# Patient Record
Sex: MALE | Race: OTHER | NOT HISPANIC OR LATINO
[De-identification: names, ages, dates, MRNs, and addresses within clinical notes are randomized per-mention and may not be internally consistent; named-entity substitution may affect disease eponyms.]

---

## 2020-10-29 ENCOUNTER — RESULT REVIEW (OUTPATIENT)
Age: 70
End: 2020-10-29

## 2020-10-29 ENCOUNTER — OUTPATIENT (OUTPATIENT)
Dept: OUTPATIENT SERVICES | Facility: HOSPITAL | Age: 70
LOS: 1 days | End: 2020-10-29
Payer: MEDICARE

## 2020-10-29 VITALS
HEIGHT: 66 IN | HEART RATE: 61 BPM | TEMPERATURE: 98 F | OXYGEN SATURATION: 99 % | SYSTOLIC BLOOD PRESSURE: 124 MMHG | DIASTOLIC BLOOD PRESSURE: 82 MMHG | WEIGHT: 153 LBS | RESPIRATION RATE: 28 BRPM

## 2020-10-29 VITALS
RESPIRATION RATE: 20 BRPM | OXYGEN SATURATION: 99 % | DIASTOLIC BLOOD PRESSURE: 71 MMHG | SYSTOLIC BLOOD PRESSURE: 101 MMHG | HEART RATE: 56 BPM

## 2020-10-29 DIAGNOSIS — Z87.81 PERSONAL HISTORY OF (HEALED) TRAUMATIC FRACTURE: Chronic | ICD-10-CM

## 2020-10-29 DIAGNOSIS — Z86.010 PERSONAL HISTORY OF COLONIC POLYPS: ICD-10-CM

## 2020-10-29 LAB — GLUCOSE BLDC GLUCOMTR-MCNC: 140 MG/DL — HIGH (ref 70–99)

## 2020-10-29 PROCEDURE — 88305 TISSUE EXAM BY PATHOLOGIST: CPT | Mod: 26

## 2020-10-29 PROCEDURE — 45385 COLONOSCOPY W/LESION REMOVAL: CPT | Mod: PT

## 2020-10-29 PROCEDURE — 88305 TISSUE EXAM BY PATHOLOGIST: CPT

## 2020-10-29 PROCEDURE — 82962 GLUCOSE BLOOD TEST: CPT

## 2020-10-29 RX ORDER — ALLOPURINOL 300 MG
1 TABLET ORAL
Qty: 0 | Refills: 0 | DISCHARGE

## 2020-10-29 RX ORDER — CANAGLIFLOZIN AND METFORMIN HYDROCHLORIDE 50; 500 MG/1; MG/1
1 TABLET, FILM COATED, EXTENDED RELEASE ORAL
Qty: 0 | Refills: 0 | DISCHARGE

## 2020-10-29 RX ORDER — SODIUM CHLORIDE 9 MG/ML
1000 INJECTION INTRAMUSCULAR; INTRAVENOUS; SUBCUTANEOUS
Refills: 0 | Status: DISCONTINUED | OUTPATIENT
Start: 2020-10-29 | End: 2020-11-12

## 2020-10-29 RX ORDER — SACUBITRIL AND VALSARTAN 24; 26 MG/1; MG/1
0 TABLET, FILM COATED ORAL
Qty: 0 | Refills: 0 | DISCHARGE

## 2020-10-29 NOTE — ASU PATIENT PROFILE, ADULT - PMH
CAD (coronary artery disease)  reports angiogram - 1 year ago - pt reports non obstructive  at [facility]  CHF (congestive heart failure)    Diabetes  A1C 6.8  on admission  Former smoker    HLD (hyperlipidemia)    Hypertension

## 2020-10-29 NOTE — PRE PROCEDURE NOTE - PRE PROCEDURE EVALUATION
Attending Physician:              Ryan Sanford M.D.              Procedure: colonoscopy    Indication for Procedure: CRCS  ________________________________________________________  PAST MEDICAL & SURGICAL HISTORY:  CHF (congestive heart failure)    CAD (coronary artery disease)  reports angiogram - 1 year ago - pt reports non obstructive  at [facility]    HLD (hyperlipidemia)    Former smoker    Diabetes  A1C 6.8  on admission    Hypertension    H/O fracture of wrist  and left ankle (ORIF ankle) following fall      ALLERGIES:  No Known Allergies    HOME MEDICATIONS:  allopurinol 300 mg oral tablet: 1 tab(s) orally once a day  aspirin 81 mg oral delayed release tablet: 1 tab(s) orally once a day  HOME/HOSP  atorvastatin 80 mg oral tablet: 1 tab(s) orally once a day (at bedtime)  HOME/HOSP  Coreg 6.25 mg oral tablet: 1 tab(s) orally 2 times a day HOME/HOSP  Entresto 24 mg-26 mg oral tablet: orally 2 times a day  Invokamet 150 mg-1000 mg oral tablet: 1 tab(s) orally 2 times a day (with meals)  Lasix 40 mg oral tablet: 1 tab(s) orally once a day    AICD/PPM: [ ] yes   [ ] no    PERTINENT LAB DATA:                      PHYSICAL EXAMINATION:    Height (cm): 167.6  Weight (kg): 69.4  BMI (kg/m2): 24.7  BSA (m2): 1.78T(C): 36.4  HR: 61  BP: 124/82  RR: 28  SpO2: 99%    Constitutional: NAD  HEENT: PERRLA, EOMI,    Neck:  No JVD  Respiratory: CTAB/L  Cardiovascular: S1 and S2  Gastrointestinal: BS+, soft, NT/ND  Extremities: No peripheral edema  Neurological: A/O x 3, no focal deficits  Psychiatric: Normal mood, normal affect  Skin: No rashes    ASA Class: I [ ]  II [ ]  III [ ]  IV [x ]    COMMENTS:    The patient is a suitable candidate for the planned procedure unless box checked [ ]  No, explain:

## 2020-10-30 LAB — SURGICAL PATHOLOGY STUDY: SIGNIFICANT CHANGE UP

## 2022-01-01 ENCOUNTER — INPATIENT (INPATIENT)
Facility: HOSPITAL | Age: 72
LOS: 5 days | DRG: 215 | End: 2022-05-17
Attending: INTERNAL MEDICINE | Admitting: INTERNAL MEDICINE
Payer: MEDICARE

## 2022-01-01 VITALS — HEIGHT: 66 IN | HEART RATE: 94 BPM | OXYGEN SATURATION: 99 % | WEIGHT: 162.04 LBS | RESPIRATION RATE: 36 BRPM

## 2022-01-01 DIAGNOSIS — Z51.5 ENCOUNTER FOR PALLIATIVE CARE: ICD-10-CM

## 2022-01-01 DIAGNOSIS — R79.89 OTHER SPECIFIED ABNORMAL FINDINGS OF BLOOD CHEMISTRY: ICD-10-CM

## 2022-01-01 DIAGNOSIS — R57.0 CARDIOGENIC SHOCK: ICD-10-CM

## 2022-01-01 DIAGNOSIS — I50.23 ACUTE ON CHRONIC SYSTOLIC (CONGESTIVE) HEART FAILURE: ICD-10-CM

## 2022-01-01 DIAGNOSIS — N17.9 ACUTE KIDNEY FAILURE, UNSPECIFIED: ICD-10-CM

## 2022-01-01 DIAGNOSIS — Z71.89 OTHER SPECIFIED COUNSELING: ICD-10-CM

## 2022-01-01 DIAGNOSIS — Z87.81 PERSONAL HISTORY OF (HEALED) TRAUMATIC FRACTURE: Chronic | ICD-10-CM

## 2022-01-01 DIAGNOSIS — R53.2 FUNCTIONAL QUADRIPLEGIA: ICD-10-CM

## 2022-01-01 LAB
-  AMPICILLIN/SULBACTAM: SIGNIFICANT CHANGE UP
-  CEFAZOLIN: SIGNIFICANT CHANGE UP
-  CLINDAMYCIN: SIGNIFICANT CHANGE UP
-  ERYTHROMYCIN: SIGNIFICANT CHANGE UP
-  GENTAMICIN: SIGNIFICANT CHANGE UP
-  LINEZOLID: SIGNIFICANT CHANGE UP
-  OXACILLIN: SIGNIFICANT CHANGE UP
-  PENICILLIN: SIGNIFICANT CHANGE UP
-  RIFAMPIN: SIGNIFICANT CHANGE UP
-  TETRACYCLINE: SIGNIFICANT CHANGE UP
-  TRIMETHOPRIM/SULFAMETHOXAZOLE: SIGNIFICANT CHANGE UP
-  VANCOMYCIN: SIGNIFICANT CHANGE UP
A1C WITH ESTIMATED AVERAGE GLUCOSE RESULT: 7.7 % — HIGH (ref 4–5.6)
ALBUMIN SERPL ELPH-MCNC: 2.4 G/DL — LOW (ref 3.3–5)
ALBUMIN SERPL ELPH-MCNC: 2.5 G/DL — LOW (ref 3.3–5)
ALBUMIN SERPL ELPH-MCNC: 2.5 G/DL — LOW (ref 3.3–5)
ALBUMIN SERPL ELPH-MCNC: 2.6 G/DL — LOW (ref 3.3–5)
ALBUMIN SERPL ELPH-MCNC: 2.7 G/DL — LOW (ref 3.3–5)
ALBUMIN SERPL ELPH-MCNC: 2.8 G/DL — LOW (ref 3.3–5)
ALBUMIN SERPL ELPH-MCNC: 2.9 G/DL — LOW (ref 3.3–5)
ALBUMIN SERPL ELPH-MCNC: 3 G/DL — LOW (ref 3.3–5)
ALBUMIN SERPL ELPH-MCNC: 3 G/DL — LOW (ref 3.3–5)
ALBUMIN SERPL ELPH-MCNC: 3.1 G/DL — LOW (ref 3.3–5)
ALBUMIN SERPL ELPH-MCNC: 3.2 G/DL — LOW (ref 3.3–5)
ALBUMIN SERPL ELPH-MCNC: 3.3 G/DL — SIGNIFICANT CHANGE UP (ref 3.3–5)
ALBUMIN SERPL ELPH-MCNC: 4.7 G/DL — SIGNIFICANT CHANGE UP (ref 3.3–5)
ALP SERPL-CCNC: 101 U/L — SIGNIFICANT CHANGE UP (ref 40–120)
ALP SERPL-CCNC: 115 U/L — SIGNIFICANT CHANGE UP (ref 40–120)
ALP SERPL-CCNC: 117 U/L — SIGNIFICANT CHANGE UP (ref 40–120)
ALP SERPL-CCNC: 123 U/L — HIGH (ref 40–120)
ALP SERPL-CCNC: 140 U/L — HIGH (ref 40–120)
ALP SERPL-CCNC: 151 U/L — HIGH (ref 40–120)
ALP SERPL-CCNC: 160 U/L — HIGH (ref 40–120)
ALP SERPL-CCNC: 188 U/L — HIGH (ref 40–120)
ALP SERPL-CCNC: 190 U/L — HIGH (ref 40–120)
ALP SERPL-CCNC: 203 U/L — HIGH (ref 40–120)
ALP SERPL-CCNC: 206 U/L — HIGH (ref 40–120)
ALP SERPL-CCNC: 210 U/L — HIGH (ref 40–120)
ALP SERPL-CCNC: 218 U/L — HIGH (ref 40–120)
ALP SERPL-CCNC: 223 U/L — HIGH (ref 40–120)
ALP SERPL-CCNC: 245 U/L — HIGH (ref 40–120)
ALP SERPL-CCNC: 89 U/L — SIGNIFICANT CHANGE UP (ref 40–120)
ALP SERPL-CCNC: 91 U/L — SIGNIFICANT CHANGE UP (ref 40–120)
ALP SERPL-CCNC: 97 U/L — SIGNIFICANT CHANGE UP (ref 40–120)
ALT FLD-CCNC: 1600 U/L — HIGH (ref 10–45)
ALT FLD-CCNC: 2200 U/L — HIGH (ref 10–45)
ALT FLD-CCNC: 246 U/L — HIGH (ref 10–45)
ALT FLD-CCNC: 2480 U/L — HIGH (ref 10–45)
ALT FLD-CCNC: 2672 U/L — HIGH (ref 10–45)
ALT FLD-CCNC: 3047 U/L — HIGH (ref 10–45)
ALT FLD-CCNC: 3338 U/L — HIGH (ref 10–45)
ALT FLD-CCNC: 3735 U/L — HIGH (ref 10–45)
ALT FLD-CCNC: 4417 U/L — HIGH (ref 10–45)
ALT FLD-CCNC: 4503 U/L — HIGH (ref 10–45)
ALT FLD-CCNC: 4838 U/L — HIGH (ref 10–45)
ALT FLD-CCNC: 5005 U/L — HIGH (ref 10–45)
ALT FLD-CCNC: 5611 U/L — HIGH (ref 10–45)
ALT FLD-CCNC: 6524 U/L — HIGH (ref 10–45)
ALT FLD-CCNC: 6743 U/L — HIGH (ref 10–45)
ALT FLD-CCNC: 6953 U/L — HIGH (ref 10–45)
ALT FLD-CCNC: 7109 U/L — HIGH (ref 10–45)
ALT FLD-CCNC: 7381 U/L — HIGH (ref 10–45)
AMMONIA BLD-MCNC: 60 UMOL/L — HIGH (ref 11–55)
ANION GAP SERPL CALC-SCNC: 19 MMOL/L — HIGH (ref 5–17)
ANION GAP SERPL CALC-SCNC: 20 MMOL/L — HIGH (ref 5–17)
ANION GAP SERPL CALC-SCNC: 20 MMOL/L — HIGH (ref 5–17)
ANION GAP SERPL CALC-SCNC: 21 MMOL/L — HIGH (ref 5–17)
ANION GAP SERPL CALC-SCNC: 21 MMOL/L — HIGH (ref 5–17)
ANION GAP SERPL CALC-SCNC: 23 MMOL/L — HIGH (ref 5–17)
ANION GAP SERPL CALC-SCNC: 24 MMOL/L — HIGH (ref 5–17)
ANION GAP SERPL CALC-SCNC: 24 MMOL/L — HIGH (ref 5–17)
ANION GAP SERPL CALC-SCNC: 25 MMOL/L — HIGH (ref 5–17)
ANION GAP SERPL CALC-SCNC: 26 MMOL/L — HIGH (ref 5–17)
ANION GAP SERPL CALC-SCNC: 26 MMOL/L — HIGH (ref 5–17)
ANION GAP SERPL CALC-SCNC: 29 MMOL/L — HIGH (ref 5–17)
ANION GAP SERPL CALC-SCNC: 29 MMOL/L — HIGH (ref 5–17)
ANION GAP SERPL CALC-SCNC: 31 MMOL/L — HIGH (ref 5–17)
ANISOCYTOSIS BLD QL: SLIGHT — SIGNIFICANT CHANGE UP
APPEARANCE UR: CLEAR — SIGNIFICANT CHANGE UP
APTT BLD: 45.3 SEC — HIGH (ref 27.5–35.5)
APTT BLD: 48.7 SEC — HIGH (ref 27.5–35.5)
APTT BLD: 48.9 SEC — HIGH (ref 27.5–35.5)
APTT BLD: 62.8 SEC — HIGH (ref 27.5–35.5)
APTT BLD: 70.3 SEC — HIGH (ref 27.5–35.5)
APTT BLD: 74.3 SEC — HIGH (ref 27.5–35.5)
APTT BLD: 75.4 SEC — HIGH (ref 27.5–35.5)
APTT BLD: 79.1 SEC — HIGH (ref 27.5–35.5)
APTT BLD: 90.7 SEC — HIGH (ref 27.5–35.5)
APTT BLD: >200 SEC — CRITICAL HIGH (ref 27.5–35.5)
AST SERPL-CCNC: 1087 U/L — HIGH (ref 10–40)
AST SERPL-CCNC: 1619 U/L — HIGH (ref 10–40)
AST SERPL-CCNC: 1973 U/L — HIGH (ref 10–40)
AST SERPL-CCNC: 1976 U/L — HIGH (ref 10–40)
AST SERPL-CCNC: 2660 U/L — HIGH (ref 10–40)
AST SERPL-CCNC: 304 U/L — HIGH (ref 10–40)
AST SERPL-CCNC: 3804 U/L — HIGH (ref 10–40)
AST SERPL-CCNC: 4957 U/L — HIGH (ref 10–40)
AST SERPL-CCNC: 5016 U/L — HIGH (ref 10–40)
AST SERPL-CCNC: 5831 U/L — HIGH (ref 10–40)
AST SERPL-CCNC: 684 U/L — HIGH (ref 10–40)
AST SERPL-CCNC: 7104 U/L — HIGH (ref 10–40)
AST SERPL-CCNC: 8919 U/L — HIGH (ref 10–40)
AST SERPL-CCNC: 9096 U/L — HIGH (ref 10–40)
AST SERPL-CCNC: 940 U/L — HIGH (ref 10–40)
AST SERPL-CCNC: HIGH U/L (ref 10–40)
BACTERIA # UR AUTO: NEGATIVE — SIGNIFICANT CHANGE UP
BASE EXCESS BLDMV CALC-SCNC: -0.2 MMOL/L — SIGNIFICANT CHANGE UP (ref -3–3)
BASE EXCESS BLDMV CALC-SCNC: -0.5 MMOL/L — SIGNIFICANT CHANGE UP (ref -3–3)
BASE EXCESS BLDMV CALC-SCNC: -0.5 MMOL/L — SIGNIFICANT CHANGE UP (ref -3–3)
BASE EXCESS BLDMV CALC-SCNC: -1.1 MMOL/L — SIGNIFICANT CHANGE UP (ref -3–3)
BASE EXCESS BLDMV CALC-SCNC: -1.7 MMOL/L — SIGNIFICANT CHANGE UP (ref -3–3)
BASE EXCESS BLDMV CALC-SCNC: -15.3 MMOL/L — LOW (ref -3–3)
BASE EXCESS BLDMV CALC-SCNC: -2.2 MMOL/L — SIGNIFICANT CHANGE UP (ref -3–3)
BASE EXCESS BLDMV CALC-SCNC: -4.3 MMOL/L — LOW (ref -3–3)
BASE EXCESS BLDMV CALC-SCNC: -4.4 MMOL/L — LOW (ref -3–3)
BASE EXCESS BLDMV CALC-SCNC: -5.2 MMOL/L — LOW (ref -3–3)
BASE EXCESS BLDMV CALC-SCNC: -5.4 MMOL/L — LOW (ref -3–3)
BASE EXCESS BLDMV CALC-SCNC: -5.4 MMOL/L — LOW (ref -3–3)
BASE EXCESS BLDMV CALC-SCNC: -6.1 MMOL/L — LOW (ref -3–3)
BASE EXCESS BLDMV CALC-SCNC: -6.2 MMOL/L — LOW (ref -3–3)
BASE EXCESS BLDMV CALC-SCNC: -6.6 MMOL/L — LOW (ref -3–3)
BASE EXCESS BLDMV CALC-SCNC: -7.4 MMOL/L — LOW (ref -3–3)
BASE EXCESS BLDMV CALC-SCNC: -8.2 MMOL/L — LOW (ref -3–3)
BASE EXCESS BLDMV CALC-SCNC: 0.1 MMOL/L — SIGNIFICANT CHANGE UP (ref -3–3)
BASE EXCESS BLDMV CALC-SCNC: 0.8 MMOL/L — SIGNIFICANT CHANGE UP (ref -3–3)
BASE EXCESS BLDMV CALC-SCNC: 1.2 MMOL/L — SIGNIFICANT CHANGE UP (ref -3–3)
BASE EXCESS BLDMV CALC-SCNC: 1.5 MMOL/L — SIGNIFICANT CHANGE UP (ref -3–3)
BASE EXCESS BLDV CALC-SCNC: -14.4 MMOL/L — LOW (ref -2–2)
BASE EXCESS BLDV CALC-SCNC: -5.8 MMOL/L — LOW (ref -2–2)
BASE EXCESS BLDV CALC-SCNC: 0.4 MMOL/L — SIGNIFICANT CHANGE UP (ref -2–2)
BASOPHILS # BLD AUTO: 0 K/UL — SIGNIFICANT CHANGE UP (ref 0–0.2)
BASOPHILS # BLD AUTO: 0 K/UL — SIGNIFICANT CHANGE UP (ref 0–0.2)
BASOPHILS # BLD AUTO: 0.03 K/UL — SIGNIFICANT CHANGE UP (ref 0–0.2)
BASOPHILS # BLD AUTO: 0.05 K/UL — SIGNIFICANT CHANGE UP (ref 0–0.2)
BASOPHILS # BLD AUTO: 0.07 K/UL — SIGNIFICANT CHANGE UP (ref 0–0.2)
BASOPHILS # BLD AUTO: 0.09 K/UL — SIGNIFICANT CHANGE UP (ref 0–0.2)
BASOPHILS NFR BLD AUTO: 0 % — SIGNIFICANT CHANGE UP (ref 0–2)
BASOPHILS NFR BLD AUTO: 0 % — SIGNIFICANT CHANGE UP (ref 0–2)
BASOPHILS NFR BLD AUTO: 0.4 % — SIGNIFICANT CHANGE UP (ref 0–2)
BASOPHILS NFR BLD AUTO: 0.5 % — SIGNIFICANT CHANGE UP (ref 0–2)
BASOPHILS NFR BLD AUTO: 0.7 % — SIGNIFICANT CHANGE UP (ref 0–2)
BASOPHILS NFR BLD AUTO: 0.9 % — SIGNIFICANT CHANGE UP (ref 0–2)
BILIRUB SERPL-MCNC: 10.3 MG/DL — HIGH (ref 0.2–1.2)
BILIRUB SERPL-MCNC: 10.7 MG/DL — HIGH (ref 0.2–1.2)
BILIRUB SERPL-MCNC: 10.7 MG/DL — HIGH (ref 0.2–1.2)
BILIRUB SERPL-MCNC: 11 MG/DL — HIGH (ref 0.2–1.2)
BILIRUB SERPL-MCNC: 12 MG/DL — HIGH (ref 0.2–1.2)
BILIRUB SERPL-MCNC: 12.3 MG/DL — HIGH (ref 0.2–1.2)
BILIRUB SERPL-MCNC: 13 MG/DL — HIGH (ref 0.2–1.2)
BILIRUB SERPL-MCNC: 14.7 MG/DL — HIGH (ref 0.2–1.2)
BILIRUB SERPL-MCNC: 2.1 MG/DL — HIGH (ref 0.2–1.2)
BILIRUB SERPL-MCNC: 2.2 MG/DL — HIGH (ref 0.2–1.2)
BILIRUB SERPL-MCNC: 2.9 MG/DL — HIGH (ref 0.2–1.2)
BILIRUB SERPL-MCNC: 3.5 MG/DL — HIGH (ref 0.2–1.2)
BILIRUB SERPL-MCNC: 4.4 MG/DL — HIGH (ref 0.2–1.2)
BILIRUB SERPL-MCNC: 5.1 MG/DL — HIGH (ref 0.2–1.2)
BILIRUB SERPL-MCNC: 6.6 MG/DL — HIGH (ref 0.2–1.2)
BILIRUB SERPL-MCNC: 8.2 MG/DL — HIGH (ref 0.2–1.2)
BILIRUB SERPL-MCNC: 9.2 MG/DL — HIGH (ref 0.2–1.2)
BILIRUB SERPL-MCNC: 9.4 MG/DL — HIGH (ref 0.2–1.2)
BILIRUB UR-MCNC: NEGATIVE — SIGNIFICANT CHANGE UP
BLD GP AB SCN SERPL QL: NEGATIVE — SIGNIFICANT CHANGE UP
BUN SERPL-MCNC: 26 MG/DL — HIGH (ref 7–23)
BUN SERPL-MCNC: 26 MG/DL — HIGH (ref 7–23)
BUN SERPL-MCNC: 27 MG/DL — HIGH (ref 7–23)
BUN SERPL-MCNC: 27 MG/DL — HIGH (ref 7–23)
BUN SERPL-MCNC: 29 MG/DL — HIGH (ref 7–23)
BUN SERPL-MCNC: 32 MG/DL — HIGH (ref 7–23)
BUN SERPL-MCNC: 33 MG/DL — HIGH (ref 7–23)
BUN SERPL-MCNC: 35 MG/DL — HIGH (ref 7–23)
BUN SERPL-MCNC: 38 MG/DL — HIGH (ref 7–23)
BUN SERPL-MCNC: 41 MG/DL — HIGH (ref 7–23)
BUN SERPL-MCNC: 45 MG/DL — HIGH (ref 7–23)
BUN SERPL-MCNC: 46 MG/DL — HIGH (ref 7–23)
BUN SERPL-MCNC: 48 MG/DL — HIGH (ref 7–23)
BUN SERPL-MCNC: 55 MG/DL — HIGH (ref 7–23)
BUN SERPL-MCNC: 57 MG/DL — HIGH (ref 7–23)
BUN SERPL-MCNC: 65 MG/DL — HIGH (ref 7–23)
BUN SERPL-MCNC: 71 MG/DL — HIGH (ref 7–23)
BUN SERPL-MCNC: 74 MG/DL — HIGH (ref 7–23)
C DIFF GDH STL QL: NEGATIVE — SIGNIFICANT CHANGE UP
C DIFF GDH STL QL: SIGNIFICANT CHANGE UP
CA-I SERPL-SCNC: 0.94 MMOL/L — LOW (ref 1.15–1.33)
CA-I SERPL-SCNC: 1.03 MMOL/L — LOW (ref 1.15–1.33)
CA-I SERPL-SCNC: 1.12 MMOL/L — LOW (ref 1.15–1.33)
CALCIUM SERPL-MCNC: 7 MG/DL — LOW (ref 8.4–10.5)
CALCIUM SERPL-MCNC: 7.1 MG/DL — LOW (ref 8.4–10.5)
CALCIUM SERPL-MCNC: 7.5 MG/DL — LOW (ref 8.4–10.5)
CALCIUM SERPL-MCNC: 7.7 MG/DL — LOW (ref 8.4–10.5)
CALCIUM SERPL-MCNC: 7.7 MG/DL — LOW (ref 8.4–10.5)
CALCIUM SERPL-MCNC: 8 MG/DL — LOW (ref 8.4–10.5)
CALCIUM SERPL-MCNC: 8.1 MG/DL — LOW (ref 8.4–10.5)
CALCIUM SERPL-MCNC: 8.2 MG/DL — LOW (ref 8.4–10.5)
CALCIUM SERPL-MCNC: 8.3 MG/DL — LOW (ref 8.4–10.5)
CALCIUM SERPL-MCNC: 8.3 MG/DL — LOW (ref 8.4–10.5)
CALCIUM SERPL-MCNC: 8.4 MG/DL — SIGNIFICANT CHANGE UP (ref 8.4–10.5)
CALCIUM SERPL-MCNC: 8.7 MG/DL — SIGNIFICANT CHANGE UP (ref 8.4–10.5)
CALCIUM SERPL-MCNC: 8.8 MG/DL — SIGNIFICANT CHANGE UP (ref 8.4–10.5)
CALCIUM SERPL-MCNC: 9.3 MG/DL — SIGNIFICANT CHANGE UP (ref 8.4–10.5)
CALCIUM SERPL-MCNC: 9.5 MG/DL — SIGNIFICANT CHANGE UP (ref 8.4–10.5)
CHLORIDE BLDV-SCNC: 100 MMOL/L — SIGNIFICANT CHANGE UP (ref 96–108)
CHLORIDE BLDV-SCNC: 101 MMOL/L — SIGNIFICANT CHANGE UP (ref 96–108)
CHLORIDE BLDV-SCNC: 96 MMOL/L — SIGNIFICANT CHANGE UP (ref 96–108)
CHLORIDE SERPL-SCNC: 100 MMOL/L — SIGNIFICANT CHANGE UP (ref 96–108)
CHLORIDE SERPL-SCNC: 101 MMOL/L — SIGNIFICANT CHANGE UP (ref 96–108)
CHLORIDE SERPL-SCNC: 102 MMOL/L — SIGNIFICANT CHANGE UP (ref 96–108)
CHLORIDE SERPL-SCNC: 103 MMOL/L — SIGNIFICANT CHANGE UP (ref 96–108)
CHLORIDE SERPL-SCNC: 95 MMOL/L — LOW (ref 96–108)
CHLORIDE SERPL-SCNC: 99 MMOL/L — SIGNIFICANT CHANGE UP (ref 96–108)
CHLORIDE SERPL-SCNC: 99 MMOL/L — SIGNIFICANT CHANGE UP (ref 96–108)
CHOLEST SERPL-MCNC: 118 MG/DL — SIGNIFICANT CHANGE UP
CK MB BLD-MCNC: 1.1 % — SIGNIFICANT CHANGE UP (ref 0–3.5)
CK MB BLD-MCNC: 1.2 % — SIGNIFICANT CHANGE UP (ref 0–3.5)
CK MB BLD-MCNC: 1.9 % — SIGNIFICANT CHANGE UP (ref 0–3.5)
CK MB CFR SERPL CALC: 4.4 NG/ML — SIGNIFICANT CHANGE UP (ref 0–6.7)
CK MB CFR SERPL CALC: 5.9 NG/ML — SIGNIFICANT CHANGE UP (ref 0–6.7)
CK MB CFR SERPL CALC: 7.2 NG/ML — HIGH (ref 0–6.7)
CK SERPL-CCNC: 306 U/L — HIGH (ref 30–200)
CK SERPL-CCNC: 404 U/L — HIGH (ref 30–200)
CK SERPL-CCNC: 590 U/L — HIGH (ref 30–200)
CK SERPL-CCNC: 595 U/L — HIGH (ref 30–200)
CO2 BLDMV-SCNC: 15 MMOL/L — LOW (ref 21–29)
CO2 BLDMV-SCNC: 18 MMOL/L — LOW (ref 21–29)
CO2 BLDMV-SCNC: 19 MMOL/L — LOW (ref 21–29)
CO2 BLDMV-SCNC: 19 MMOL/L — LOW (ref 21–29)
CO2 BLDMV-SCNC: 20 MMOL/L — LOW (ref 21–29)
CO2 BLDMV-SCNC: 21 MMOL/L — SIGNIFICANT CHANGE UP (ref 21–29)
CO2 BLDMV-SCNC: 23 MMOL/L — SIGNIFICANT CHANGE UP (ref 21–29)
CO2 BLDMV-SCNC: 24 MMOL/L — SIGNIFICANT CHANGE UP (ref 21–29)
CO2 BLDMV-SCNC: 24 MMOL/L — SIGNIFICANT CHANGE UP (ref 21–29)
CO2 BLDMV-SCNC: 25 MMOL/L — SIGNIFICANT CHANGE UP (ref 21–29)
CO2 BLDMV-SCNC: 26 MMOL/L — SIGNIFICANT CHANGE UP (ref 21–29)
CO2 BLDMV-SCNC: 26 MMOL/L — SIGNIFICANT CHANGE UP (ref 21–29)
CO2 BLDMV-SCNC: 27 MMOL/L — SIGNIFICANT CHANGE UP (ref 21–29)
CO2 BLDMV-SCNC: 28 MMOL/L — SIGNIFICANT CHANGE UP (ref 21–29)
CO2 BLDMV-SCNC: 28 MMOL/L — SIGNIFICANT CHANGE UP (ref 21–29)
CO2 BLDV-SCNC: 20 MMOL/L — LOW (ref 22–26)
CO2 BLDV-SCNC: 21 MMOL/L — LOW (ref 22–26)
CO2 BLDV-SCNC: 27 MMOL/L — HIGH (ref 22–26)
CO2 SERPL-SCNC: 13 MMOL/L — LOW (ref 22–31)
CO2 SERPL-SCNC: 14 MMOL/L — LOW (ref 22–31)
CO2 SERPL-SCNC: 16 MMOL/L — LOW (ref 22–31)
CO2 SERPL-SCNC: 17 MMOL/L — LOW (ref 22–31)
CO2 SERPL-SCNC: 19 MMOL/L — LOW (ref 22–31)
CO2 SERPL-SCNC: 19 MMOL/L — LOW (ref 22–31)
CO2 SERPL-SCNC: 20 MMOL/L — LOW (ref 22–31)
CO2 SERPL-SCNC: 21 MMOL/L — LOW (ref 22–31)
CO2 SERPL-SCNC: 21 MMOL/L — LOW (ref 22–31)
COLOR SPEC: YELLOW — SIGNIFICANT CHANGE UP
CREAT SERPL-MCNC: 1.8 MG/DL — HIGH (ref 0.5–1.3)
CREAT SERPL-MCNC: 1.83 MG/DL — HIGH (ref 0.5–1.3)
CREAT SERPL-MCNC: 1.88 MG/DL — HIGH (ref 0.5–1.3)
CREAT SERPL-MCNC: 1.98 MG/DL — HIGH (ref 0.5–1.3)
CREAT SERPL-MCNC: 2.03 MG/DL — HIGH (ref 0.5–1.3)
CREAT SERPL-MCNC: 2.09 MG/DL — HIGH (ref 0.5–1.3)
CREAT SERPL-MCNC: 2.11 MG/DL — HIGH (ref 0.5–1.3)
CREAT SERPL-MCNC: 2.29 MG/DL — HIGH (ref 0.5–1.3)
CREAT SERPL-MCNC: 2.44 MG/DL — HIGH (ref 0.5–1.3)
CREAT SERPL-MCNC: 2.56 MG/DL — HIGH (ref 0.5–1.3)
CREAT SERPL-MCNC: 2.94 MG/DL — HIGH (ref 0.5–1.3)
CREAT SERPL-MCNC: 3.31 MG/DL — HIGH (ref 0.5–1.3)
CREAT SERPL-MCNC: 3.72 MG/DL — HIGH (ref 0.5–1.3)
CREAT SERPL-MCNC: 4.13 MG/DL — HIGH (ref 0.5–1.3)
CREAT SERPL-MCNC: 4.32 MG/DL — HIGH (ref 0.5–1.3)
CREAT SERPL-MCNC: 4.99 MG/DL — HIGH (ref 0.5–1.3)
CREAT SERPL-MCNC: 5.69 MG/DL — HIGH (ref 0.5–1.3)
CREAT SERPL-MCNC: 5.83 MG/DL — HIGH (ref 0.5–1.3)
CULTURE RESULTS: SIGNIFICANT CHANGE UP
D DIMER BLD IA.RAPID-MCNC: HIGH NG/ML DDU
DIFF PNL FLD: ABNORMAL
EGFR: 10 ML/MIN/1.73M2 — LOW
EGFR: 10 ML/MIN/1.73M2 — LOW
EGFR: 12 ML/MIN/1.73M2 — LOW
EGFR: 14 ML/MIN/1.73M2 — LOW
EGFR: 15 ML/MIN/1.73M2 — LOW
EGFR: 17 ML/MIN/1.73M2 — LOW
EGFR: 19 ML/MIN/1.73M2 — LOW
EGFR: 22 ML/MIN/1.73M2 — LOW
EGFR: 26 ML/MIN/1.73M2 — LOW
EGFR: 28 ML/MIN/1.73M2 — LOW
EGFR: 30 ML/MIN/1.73M2 — LOW
EGFR: 33 ML/MIN/1.73M2 — LOW
EGFR: 34 ML/MIN/1.73M2 — LOW
EGFR: 35 ML/MIN/1.73M2 — LOW
EGFR: 36 ML/MIN/1.73M2 — LOW
EGFR: 38 ML/MIN/1.73M2 — LOW
EGFR: 39 ML/MIN/1.73M2 — LOW
EGFR: 40 ML/MIN/1.73M2 — LOW
EOSINOPHIL # BLD AUTO: 0 K/UL — SIGNIFICANT CHANGE UP (ref 0–0.5)
EOSINOPHIL # BLD AUTO: 0.01 K/UL — SIGNIFICANT CHANGE UP (ref 0–0.5)
EOSINOPHIL # BLD AUTO: 0.01 K/UL — SIGNIFICANT CHANGE UP (ref 0–0.5)
EOSINOPHIL # BLD AUTO: 0.02 K/UL — SIGNIFICANT CHANGE UP (ref 0–0.5)
EOSINOPHIL NFR BLD AUTO: 0 % — SIGNIFICANT CHANGE UP (ref 0–6)
EOSINOPHIL NFR BLD AUTO: 0.1 % — SIGNIFICANT CHANGE UP (ref 0–6)
EPI CELLS # UR: 0 /HPF — SIGNIFICANT CHANGE UP (ref 0–5)
ESTIMATED AVERAGE GLUCOSE: 174 MG/DL — HIGH (ref 68–114)
FIBRINOGEN PPP-MCNC: 327 MG/DL — LOW (ref 330–520)
FIBRINOGEN PPP-MCNC: 353 MG/DL — SIGNIFICANT CHANGE UP (ref 330–520)
FIBRINOGEN PPP-MCNC: 366 MG/DL — SIGNIFICANT CHANGE UP (ref 330–520)
FIBRINOGEN PPP-MCNC: 434 MG/DL — SIGNIFICANT CHANGE UP (ref 330–520)
GAS PNL BLDA: SIGNIFICANT CHANGE UP
GAS PNL BLDMV: SIGNIFICANT CHANGE UP
GAS PNL BLDV: 128 MMOL/L — LOW (ref 136–145)
GAS PNL BLDV: 139 MMOL/L — SIGNIFICANT CHANGE UP (ref 136–145)
GAS PNL BLDV: 141 MMOL/L — SIGNIFICANT CHANGE UP (ref 136–145)
GAS PNL BLDV: SIGNIFICANT CHANGE UP
GIANT PLATELETS BLD QL SMEAR: PRESENT — SIGNIFICANT CHANGE UP
GLUCOSE BLDC GLUCOMTR-MCNC: 112 MG/DL — HIGH (ref 70–99)
GLUCOSE BLDC GLUCOMTR-MCNC: 119 MG/DL — HIGH (ref 70–99)
GLUCOSE BLDC GLUCOMTR-MCNC: 119 MG/DL — HIGH (ref 70–99)
GLUCOSE BLDC GLUCOMTR-MCNC: 125 MG/DL — HIGH (ref 70–99)
GLUCOSE BLDC GLUCOMTR-MCNC: 135 MG/DL — HIGH (ref 70–99)
GLUCOSE BLDC GLUCOMTR-MCNC: 140 MG/DL — HIGH (ref 70–99)
GLUCOSE BLDC GLUCOMTR-MCNC: 142 MG/DL — HIGH (ref 70–99)
GLUCOSE BLDC GLUCOMTR-MCNC: 142 MG/DL — HIGH (ref 70–99)
GLUCOSE BLDC GLUCOMTR-MCNC: 148 MG/DL — HIGH (ref 70–99)
GLUCOSE BLDC GLUCOMTR-MCNC: 148 MG/DL — HIGH (ref 70–99)
GLUCOSE BLDC GLUCOMTR-MCNC: 154 MG/DL — HIGH (ref 70–99)
GLUCOSE BLDC GLUCOMTR-MCNC: 154 MG/DL — HIGH (ref 70–99)
GLUCOSE BLDC GLUCOMTR-MCNC: 156 MG/DL — HIGH (ref 70–99)
GLUCOSE BLDC GLUCOMTR-MCNC: 162 MG/DL — HIGH (ref 70–99)
GLUCOSE BLDC GLUCOMTR-MCNC: 198 MG/DL — HIGH (ref 70–99)
GLUCOSE BLDC GLUCOMTR-MCNC: 55 MG/DL — LOW (ref 70–99)
GLUCOSE BLDC GLUCOMTR-MCNC: 55 MG/DL — LOW (ref 70–99)
GLUCOSE BLDC GLUCOMTR-MCNC: 76 MG/DL — SIGNIFICANT CHANGE UP (ref 70–99)
GLUCOSE BLDC GLUCOMTR-MCNC: 87 MG/DL — SIGNIFICANT CHANGE UP (ref 70–99)
GLUCOSE BLDC GLUCOMTR-MCNC: 93 MG/DL — SIGNIFICANT CHANGE UP (ref 70–99)
GLUCOSE BLDC GLUCOMTR-MCNC: 97 MG/DL — SIGNIFICANT CHANGE UP (ref 70–99)
GLUCOSE BLDC GLUCOMTR-MCNC: 98 MG/DL — SIGNIFICANT CHANGE UP (ref 70–99)
GLUCOSE BLDV-MCNC: 183 MG/DL — HIGH (ref 70–99)
GLUCOSE BLDV-MCNC: 223 MG/DL — HIGH (ref 70–99)
GLUCOSE BLDV-MCNC: 88 MG/DL — SIGNIFICANT CHANGE UP (ref 70–99)
GLUCOSE SERPL-MCNC: 100 MG/DL — HIGH (ref 70–99)
GLUCOSE SERPL-MCNC: 102 MG/DL — HIGH (ref 70–99)
GLUCOSE SERPL-MCNC: 112 MG/DL — HIGH (ref 70–99)
GLUCOSE SERPL-MCNC: 121 MG/DL — HIGH (ref 70–99)
GLUCOSE SERPL-MCNC: 148 MG/DL — HIGH (ref 70–99)
GLUCOSE SERPL-MCNC: 157 MG/DL — HIGH (ref 70–99)
GLUCOSE SERPL-MCNC: 159 MG/DL — HIGH (ref 70–99)
GLUCOSE SERPL-MCNC: 165 MG/DL — HIGH (ref 70–99)
GLUCOSE SERPL-MCNC: 165 MG/DL — HIGH (ref 70–99)
GLUCOSE SERPL-MCNC: 170 MG/DL — HIGH (ref 70–99)
GLUCOSE SERPL-MCNC: 177 MG/DL — HIGH (ref 70–99)
GLUCOSE SERPL-MCNC: 177 MG/DL — HIGH (ref 70–99)
GLUCOSE SERPL-MCNC: 183 MG/DL — HIGH (ref 70–99)
GLUCOSE SERPL-MCNC: 187 MG/DL — HIGH (ref 70–99)
GLUCOSE SERPL-MCNC: 201 MG/DL — HIGH (ref 70–99)
GLUCOSE SERPL-MCNC: 223 MG/DL — HIGH (ref 70–99)
GLUCOSE SERPL-MCNC: 76 MG/DL — SIGNIFICANT CHANGE UP (ref 70–99)
GLUCOSE SERPL-MCNC: 78 MG/DL — SIGNIFICANT CHANGE UP (ref 70–99)
GLUCOSE UR QL: ABNORMAL
GRAM STN FLD: SIGNIFICANT CHANGE UP
HAPTOGLOB SERPL-MCNC: 29 MG/DL — LOW (ref 34–200)
HAPTOGLOB SERPL-MCNC: 42 MG/DL — SIGNIFICANT CHANGE UP (ref 34–200)
HAPTOGLOB SERPL-MCNC: 67 MG/DL — SIGNIFICANT CHANGE UP (ref 34–200)
HAPTOGLOB SERPL-MCNC: 71 MG/DL — SIGNIFICANT CHANGE UP (ref 34–200)
HAPTOGLOB SERPL-MCNC: <20 MG/DL — LOW (ref 34–200)
HCO3 BLDMV-SCNC: 14 MMOL/L — LOW (ref 20–28)
HCO3 BLDMV-SCNC: 17 MMOL/L — LOW (ref 20–28)
HCO3 BLDMV-SCNC: 18 MMOL/L — LOW (ref 20–28)
HCO3 BLDMV-SCNC: 18 MMOL/L — LOW (ref 20–28)
HCO3 BLDMV-SCNC: 19 MMOL/L — LOW (ref 20–28)
HCO3 BLDMV-SCNC: 20 MMOL/L — SIGNIFICANT CHANGE UP (ref 20–28)
HCO3 BLDMV-SCNC: 22 MMOL/L — SIGNIFICANT CHANGE UP (ref 20–28)
HCO3 BLDMV-SCNC: 22 MMOL/L — SIGNIFICANT CHANGE UP (ref 20–28)
HCO3 BLDMV-SCNC: 23 MMOL/L — SIGNIFICANT CHANGE UP (ref 20–28)
HCO3 BLDMV-SCNC: 24 MMOL/L — SIGNIFICANT CHANGE UP (ref 20–28)
HCO3 BLDMV-SCNC: 25 MMOL/L — SIGNIFICANT CHANGE UP (ref 20–28)
HCO3 BLDMV-SCNC: 25 MMOL/L — SIGNIFICANT CHANGE UP (ref 20–28)
HCO3 BLDMV-SCNC: 26 MMOL/L — SIGNIFICANT CHANGE UP (ref 20–28)
HCO3 BLDMV-SCNC: 27 MMOL/L — SIGNIFICANT CHANGE UP (ref 20–28)
HCO3 BLDMV-SCNC: 27 MMOL/L — SIGNIFICANT CHANGE UP (ref 20–28)
HCO3 BLDV-SCNC: 18 MMOL/L — LOW (ref 22–29)
HCO3 BLDV-SCNC: 20 MMOL/L — LOW (ref 22–29)
HCO3 BLDV-SCNC: 26 MMOL/L — SIGNIFICANT CHANGE UP (ref 22–29)
HCT VFR BLD CALC: 33.2 % — LOW (ref 39–50)
HCT VFR BLD CALC: 34.6 % — LOW (ref 39–50)
HCT VFR BLD CALC: 35.3 % — LOW (ref 39–50)
HCT VFR BLD CALC: 36.6 % — LOW (ref 39–50)
HCT VFR BLD CALC: 36.8 % — LOW (ref 39–50)
HCT VFR BLD CALC: 36.8 % — LOW (ref 39–50)
HCT VFR BLD CALC: 38.6 % — LOW (ref 39–50)
HCT VFR BLD CALC: 40.4 % — SIGNIFICANT CHANGE UP (ref 39–50)
HCT VFR BLD CALC: 40.7 % — SIGNIFICANT CHANGE UP (ref 39–50)
HCT VFR BLD CALC: 41.4 % — SIGNIFICANT CHANGE UP (ref 39–50)
HCT VFR BLD CALC: 43.5 % — SIGNIFICANT CHANGE UP (ref 39–50)
HCT VFR BLD CALC: 45.6 % — SIGNIFICANT CHANGE UP (ref 39–50)
HCT VFR BLD CALC: 61 % — CRITICAL HIGH (ref 39–50)
HCT VFR BLDA CALC: 33 % — LOW (ref 39–51)
HCT VFR BLDA CALC: 34 % — LOW (ref 39–51)
HCT VFR BLDA CALC: 55 % — HIGH (ref 39–51)
HCV AB S/CO SERPL IA: 0.07 S/CO — SIGNIFICANT CHANGE UP (ref 0–0.99)
HCV AB SERPL-IMP: SIGNIFICANT CHANGE UP
HDLC SERPL-MCNC: 19 MG/DL — LOW
HGB BLD CALC-MCNC: 11 G/DL — LOW (ref 12.6–17.4)
HGB BLD CALC-MCNC: 11.2 G/DL — LOW (ref 12.6–17.4)
HGB BLD CALC-MCNC: 18.3 G/DL — HIGH (ref 12.6–17.4)
HGB BLD-MCNC: 10.2 G/DL — LOW (ref 13–17)
HGB BLD-MCNC: 11 G/DL — LOW (ref 13–17)
HGB BLD-MCNC: 11.2 G/DL — LOW (ref 13–17)
HGB BLD-MCNC: 11.3 G/DL — LOW (ref 13–17)
HGB BLD-MCNC: 11.8 G/DL — LOW (ref 13–17)
HGB BLD-MCNC: 11.9 G/DL — LOW (ref 13–17)
HGB BLD-MCNC: 12.5 G/DL — LOW (ref 13–17)
HGB BLD-MCNC: 12.8 G/DL — LOW (ref 13–17)
HGB BLD-MCNC: 13 G/DL — SIGNIFICANT CHANGE UP (ref 13–17)
HGB BLD-MCNC: 13.4 G/DL — SIGNIFICANT CHANGE UP (ref 13–17)
HGB BLD-MCNC: 13.6 G/DL — SIGNIFICANT CHANGE UP (ref 13–17)
HGB BLD-MCNC: 14.2 G/DL — SIGNIFICANT CHANGE UP (ref 13–17)
HGB BLD-MCNC: 18.5 G/DL — HIGH (ref 13–17)
HMPV RNA SPEC QL NAA+PROBE: DETECTED
HOROWITZ INDEX BLDMV+IHG-RTO: 100 — SIGNIFICANT CHANGE UP
HOROWITZ INDEX BLDMV+IHG-RTO: 30 — SIGNIFICANT CHANGE UP
HOROWITZ INDEX BLDMV+IHG-RTO: 40 — SIGNIFICANT CHANGE UP
HOROWITZ INDEX BLDMV+IHG-RTO: 40 — SIGNIFICANT CHANGE UP
HOROWITZ INDEX BLDMV+IHG-RTO: 50 — SIGNIFICANT CHANGE UP
HOROWITZ INDEX BLDV+IHG-RTO: 30 — SIGNIFICANT CHANGE UP
HYALINE CASTS # UR AUTO: 3 /LPF — SIGNIFICANT CHANGE UP (ref 0–7)
IMM GRANULOCYTES NFR BLD AUTO: 0.6 % — SIGNIFICANT CHANGE UP (ref 0–1.5)
IMM GRANULOCYTES NFR BLD AUTO: 1.9 % — HIGH (ref 0–1.5)
IMM GRANULOCYTES NFR BLD AUTO: 4.2 % — HIGH (ref 0–1.5)
INR BLD: 2.27 RATIO — HIGH (ref 0.88–1.16)
INR BLD: 2.49 RATIO — HIGH (ref 0.88–1.16)
INR BLD: 2.59 RATIO — HIGH (ref 0.88–1.16)
INR BLD: 2.71 RATIO — HIGH (ref 0.88–1.16)
INR BLD: 2.86 RATIO — HIGH (ref 0.88–1.16)
INR BLD: 2.92 RATIO — HIGH (ref 0.88–1.16)
INR BLD: 3.07 RATIO — HIGH (ref 0.88–1.16)
INR BLD: 3.2 RATIO — HIGH (ref 0.88–1.16)
INR BLD: 3.47 RATIO — HIGH (ref 0.88–1.16)
INR BLD: 3.62 RATIO — HIGH (ref 0.88–1.16)
KETONES UR-MCNC: SIGNIFICANT CHANGE UP
LACTATE BLDV-MCNC: 10.1 MMOL/L — CRITICAL HIGH (ref 0.7–2)
LACTATE BLDV-MCNC: 6.4 MMOL/L — CRITICAL HIGH (ref 0.7–2)
LACTATE BLDV-MCNC: 8.1 MMOL/L — CRITICAL HIGH (ref 0.7–2)
LACTATE BLDV-MCNC: 8.5 MMOL/L — CRITICAL HIGH (ref 0.7–2)
LACTATE SERPL-SCNC: 13.6 MMOL/L — CRITICAL HIGH (ref 0.7–2)
LACTATE SERPL-SCNC: 6.2 MMOL/L — CRITICAL HIGH (ref 0.7–2)
LACTATE SERPL-SCNC: 6.7 MMOL/L — CRITICAL HIGH (ref 0.7–2)
LACTATE SERPL-SCNC: 6.9 MMOL/L — CRITICAL HIGH (ref 0.7–2)
LACTATE SERPL-SCNC: 6.9 MMOL/L — CRITICAL HIGH (ref 0.7–2)
LACTATE SERPL-SCNC: 7 MMOL/L — CRITICAL HIGH (ref 0.7–2)
LACTATE SERPL-SCNC: 7.1 MMOL/L — CRITICAL HIGH (ref 0.7–2)
LACTATE SERPL-SCNC: 7.7 MMOL/L — CRITICAL HIGH (ref 0.7–2)
LDH SERPL L TO P-CCNC: 1775 U/L — HIGH (ref 50–242)
LDH SERPL L TO P-CCNC: 3875 U/L — HIGH (ref 50–242)
LDH SERPL L TO P-CCNC: 7655 U/L — HIGH (ref 50–242)
LDH SERPL L TO P-CCNC: HIGH U/L (ref 50–242)
LDH SERPL L TO P-CCNC: HIGH U/L (ref 50–242)
LEUKOCYTE ESTERASE UR-ACNC: NEGATIVE — SIGNIFICANT CHANGE UP
LIPID PNL WITH DIRECT LDL SERPL: 84 MG/DL — SIGNIFICANT CHANGE UP
LYMPHOCYTES # BLD AUTO: 0.2 K/UL — LOW (ref 1–3.3)
LYMPHOCYTES # BLD AUTO: 0.43 K/UL — LOW (ref 1–3.3)
LYMPHOCYTES # BLD AUTO: 0.59 K/UL — LOW (ref 1–3.3)
LYMPHOCYTES # BLD AUTO: 0.84 K/UL — LOW (ref 1–3.3)
LYMPHOCYTES # BLD AUTO: 0.9 K/UL — LOW (ref 1–3.3)
LYMPHOCYTES # BLD AUTO: 1.21 K/UL — SIGNIFICANT CHANGE UP (ref 1–3.3)
LYMPHOCYTES # BLD AUTO: 11.4 % — LOW (ref 13–44)
LYMPHOCYTES # BLD AUTO: 14.8 % — SIGNIFICANT CHANGE UP (ref 13–44)
LYMPHOCYTES # BLD AUTO: 2.6 % — LOW (ref 13–44)
LYMPHOCYTES # BLD AUTO: 3.6 % — LOW (ref 13–44)
LYMPHOCYTES # BLD AUTO: 5.9 % — LOW (ref 13–44)
LYMPHOCYTES # BLD AUTO: 6.7 % — LOW (ref 13–44)
MACROCYTES BLD QL: SLIGHT — SIGNIFICANT CHANGE UP
MAGNESIUM SERPL-MCNC: 2 MG/DL — SIGNIFICANT CHANGE UP (ref 1.6–2.6)
MAGNESIUM SERPL-MCNC: 2.1 MG/DL — SIGNIFICANT CHANGE UP (ref 1.6–2.6)
MAGNESIUM SERPL-MCNC: 2.2 MG/DL — SIGNIFICANT CHANGE UP (ref 1.6–2.6)
MAGNESIUM SERPL-MCNC: 2.4 MG/DL — SIGNIFICANT CHANGE UP (ref 1.6–2.6)
MAGNESIUM SERPL-MCNC: 2.5 MG/DL — SIGNIFICANT CHANGE UP (ref 1.6–2.6)
MANUAL SMEAR VERIFICATION: SIGNIFICANT CHANGE UP
MCHC RBC-ENTMCNC: 29.1 PG — SIGNIFICANT CHANGE UP (ref 27–34)
MCHC RBC-ENTMCNC: 29.1 PG — SIGNIFICANT CHANGE UP (ref 27–34)
MCHC RBC-ENTMCNC: 29.2 PG — SIGNIFICANT CHANGE UP (ref 27–34)
MCHC RBC-ENTMCNC: 29.4 PG — SIGNIFICANT CHANGE UP (ref 27–34)
MCHC RBC-ENTMCNC: 29.7 PG — SIGNIFICANT CHANGE UP (ref 27–34)
MCHC RBC-ENTMCNC: 29.9 PG — SIGNIFICANT CHANGE UP (ref 27–34)
MCHC RBC-ENTMCNC: 29.9 PG — SIGNIFICANT CHANGE UP (ref 27–34)
MCHC RBC-ENTMCNC: 30 PG — SIGNIFICANT CHANGE UP (ref 27–34)
MCHC RBC-ENTMCNC: 30.3 GM/DL — LOW (ref 32–36)
MCHC RBC-ENTMCNC: 30.4 PG — SIGNIFICANT CHANGE UP (ref 27–34)
MCHC RBC-ENTMCNC: 30.7 GM/DL — LOW (ref 32–36)
MCHC RBC-ENTMCNC: 30.7 GM/DL — LOW (ref 32–36)
MCHC RBC-ENTMCNC: 31.1 GM/DL — LOW (ref 32–36)
MCHC RBC-ENTMCNC: 31.3 GM/DL — LOW (ref 32–36)
MCHC RBC-ENTMCNC: 31.4 GM/DL — LOW (ref 32–36)
MCHC RBC-ENTMCNC: 31.7 GM/DL — LOW (ref 32–36)
MCHC RBC-ENTMCNC: 31.8 GM/DL — LOW (ref 32–36)
MCHC RBC-ENTMCNC: 32.2 GM/DL — SIGNIFICANT CHANGE UP (ref 32–36)
MCHC RBC-ENTMCNC: 32.2 GM/DL — SIGNIFICANT CHANGE UP (ref 32–36)
MCHC RBC-ENTMCNC: 32.3 GM/DL — SIGNIFICANT CHANGE UP (ref 32–36)
MCHC RBC-ENTMCNC: 32.4 GM/DL — SIGNIFICANT CHANGE UP (ref 32–36)
MCHC RBC-ENTMCNC: 32.4 GM/DL — SIGNIFICANT CHANGE UP (ref 32–36)
MCV RBC AUTO: 90.8 FL — SIGNIFICANT CHANGE UP (ref 80–100)
MCV RBC AUTO: 90.8 FL — SIGNIFICANT CHANGE UP (ref 80–100)
MCV RBC AUTO: 92.5 FL — SIGNIFICANT CHANGE UP (ref 80–100)
MCV RBC AUTO: 92.7 FL — SIGNIFICANT CHANGE UP (ref 80–100)
MCV RBC AUTO: 93.3 FL — SIGNIFICANT CHANGE UP (ref 80–100)
MCV RBC AUTO: 93.5 FL — SIGNIFICANT CHANGE UP (ref 80–100)
MCV RBC AUTO: 93.8 FL — SIGNIFICANT CHANGE UP (ref 80–100)
MCV RBC AUTO: 93.9 FL — SIGNIFICANT CHANGE UP (ref 80–100)
MCV RBC AUTO: 94.4 FL — SIGNIFICANT CHANGE UP (ref 80–100)
MCV RBC AUTO: 94.9 FL — SIGNIFICANT CHANGE UP (ref 80–100)
MCV RBC AUTO: 95 FL — SIGNIFICANT CHANGE UP (ref 80–100)
MCV RBC AUTO: 95.6 FL — SIGNIFICANT CHANGE UP (ref 80–100)
MCV RBC AUTO: 98.1 FL — SIGNIFICANT CHANGE UP (ref 80–100)
METAMYELOCYTES # FLD: 1.8 % — HIGH (ref 0–0)
METAMYELOCYTES # FLD: 5.4 % — HIGH (ref 0–0)
METAMYELOCYTES # FLD: 7 % — HIGH (ref 0–0)
METHOD TYPE: SIGNIFICANT CHANGE UP
MONOCYTES # BLD AUTO: 0 K/UL — SIGNIFICANT CHANGE UP (ref 0–0.9)
MONOCYTES # BLD AUTO: 0 K/UL — SIGNIFICANT CHANGE UP (ref 0–0.9)
MONOCYTES # BLD AUTO: 0.07 K/UL — SIGNIFICANT CHANGE UP (ref 0–0.9)
MONOCYTES # BLD AUTO: 0.39 K/UL — SIGNIFICANT CHANGE UP (ref 0–0.9)
MONOCYTES # BLD AUTO: 0.57 K/UL — SIGNIFICANT CHANGE UP (ref 0–0.9)
MONOCYTES # BLD AUTO: 1.14 K/UL — HIGH (ref 0–0.9)
MONOCYTES NFR BLD AUTO: 0 % — LOW (ref 2–14)
MONOCYTES NFR BLD AUTO: 0 % — LOW (ref 2–14)
MONOCYTES NFR BLD AUTO: 0.9 % — LOW (ref 2–14)
MONOCYTES NFR BLD AUTO: 4.8 % — SIGNIFICANT CHANGE UP (ref 2–14)
MONOCYTES NFR BLD AUTO: 5.7 % — SIGNIFICANT CHANGE UP (ref 2–14)
MONOCYTES NFR BLD AUTO: 8.4 % — SIGNIFICANT CHANGE UP (ref 2–14)
MRSA PCR RESULT.: SIGNIFICANT CHANGE UP
MYELOCYTES NFR BLD: 0.9 % — HIGH (ref 0–0)
NEUTROPHILS # BLD AUTO: 10.77 K/UL — HIGH (ref 1.8–7.4)
NEUTROPHILS # BLD AUTO: 10.79 K/UL — HIGH (ref 1.8–7.4)
NEUTROPHILS # BLD AUTO: 6.32 K/UL — SIGNIFICANT CHANGE UP (ref 1.8–7.4)
NEUTROPHILS # BLD AUTO: 6.51 K/UL — SIGNIFICANT CHANGE UP (ref 1.8–7.4)
NEUTROPHILS # BLD AUTO: 6.91 K/UL — SIGNIFICANT CHANGE UP (ref 1.8–7.4)
NEUTROPHILS # BLD AUTO: 8.57 K/UL — HIGH (ref 1.8–7.4)
NEUTROPHILS NFR BLD AUTO: 68.4 % — SIGNIFICANT CHANGE UP (ref 43–77)
NEUTROPHILS NFR BLD AUTO: 79.3 % — HIGH (ref 43–77)
NEUTROPHILS NFR BLD AUTO: 79.8 % — HIGH (ref 43–77)
NEUTROPHILS NFR BLD AUTO: 79.9 % — HIGH (ref 43–77)
NEUTROPHILS NFR BLD AUTO: 81.2 % — HIGH (ref 43–77)
NEUTROPHILS NFR BLD AUTO: 85.9 % — HIGH (ref 43–77)
NEUTS BAND # BLD: 20.2 % — HIGH (ref 0–8)
NEUTS BAND # BLD: 6.1 % — SIGNIFICANT CHANGE UP (ref 0–8)
NEUTS BAND # BLD: 9.8 % — HIGH (ref 0–8)
NITRITE UR-MCNC: NEGATIVE — SIGNIFICANT CHANGE UP
NON HDL CHOLESTEROL: 99 MG/DL — SIGNIFICANT CHANGE UP
NRBC # BLD: 0 /100 WBCS — SIGNIFICANT CHANGE UP (ref 0–0)
NRBC # BLD: 1 /100 WBCS — HIGH (ref 0–0)
NRBC # BLD: 1 /100 — HIGH (ref 0–0)
NRBC # BLD: 2 /100 WBCS — HIGH (ref 0–0)
NRBC # BLD: 4 /100 — HIGH (ref 0–0)
NT-PROBNP SERPL-SCNC: HIGH PG/ML (ref 0–300)
NT-PROBNP SERPL-SCNC: HIGH PG/ML (ref 0–300)
O2 CT VFR BLD CALC: 31 MMHG — SIGNIFICANT CHANGE UP (ref 30–65)
O2 CT VFR BLD CALC: 36 MMHG — SIGNIFICANT CHANGE UP (ref 30–65)
O2 CT VFR BLD CALC: 38 MMHG — SIGNIFICANT CHANGE UP (ref 30–65)
O2 CT VFR BLD CALC: 39 MMHG — SIGNIFICANT CHANGE UP (ref 30–65)
O2 CT VFR BLD CALC: 39 MMHG — SIGNIFICANT CHANGE UP (ref 30–65)
O2 CT VFR BLD CALC: 42 MMHG — SIGNIFICANT CHANGE UP (ref 30–65)
O2 CT VFR BLD CALC: 43 MMHG — SIGNIFICANT CHANGE UP (ref 30–65)
O2 CT VFR BLD CALC: 44 MMHG — SIGNIFICANT CHANGE UP (ref 30–65)
O2 CT VFR BLD CALC: 44 MMHG — SIGNIFICANT CHANGE UP (ref 30–65)
O2 CT VFR BLD CALC: 45 MMHG — SIGNIFICANT CHANGE UP (ref 30–65)
O2 CT VFR BLD CALC: 48 MMHG — SIGNIFICANT CHANGE UP (ref 30–65)
O2 CT VFR BLD CALC: 48 MMHG — SIGNIFICANT CHANGE UP (ref 30–65)
O2 CT VFR BLD CALC: 49 MMHG — SIGNIFICANT CHANGE UP (ref 30–65)
O2 CT VFR BLD CALC: 50 MMHG — SIGNIFICANT CHANGE UP (ref 30–65)
O2 CT VFR BLD CALC: 53 MMHG — SIGNIFICANT CHANGE UP (ref 30–65)
ORGANISM # SPEC MICROSCOPIC CNT: SIGNIFICANT CHANGE UP
ORGANISM # SPEC MICROSCOPIC CNT: SIGNIFICANT CHANGE UP
OVALOCYTES BLD QL SMEAR: SLIGHT — SIGNIFICANT CHANGE UP
PCO2 BLDMV: 31 MMHG — SIGNIFICANT CHANGE UP (ref 30–65)
PCO2 BLDMV: 32 MMHG — SIGNIFICANT CHANGE UP (ref 30–65)
PCO2 BLDMV: 34 MMHG — SIGNIFICANT CHANGE UP (ref 30–65)
PCO2 BLDMV: 35 MMHG — SIGNIFICANT CHANGE UP (ref 30–65)
PCO2 BLDMV: 35 MMHG — SIGNIFICANT CHANGE UP (ref 30–65)
PCO2 BLDMV: 37 MMHG — SIGNIFICANT CHANGE UP (ref 30–65)
PCO2 BLDMV: 37 MMHG — SIGNIFICANT CHANGE UP (ref 30–65)
PCO2 BLDMV: 38 MMHG — SIGNIFICANT CHANGE UP (ref 30–65)
PCO2 BLDMV: 39 MMHG — SIGNIFICANT CHANGE UP (ref 30–65)
PCO2 BLDMV: 41 MMHG — SIGNIFICANT CHANGE UP (ref 30–65)
PCO2 BLDMV: 41 MMHG — SIGNIFICANT CHANGE UP (ref 30–65)
PCO2 BLDMV: 42 MMHG — SIGNIFICANT CHANGE UP (ref 30–65)
PCO2 BLDMV: 43 MMHG — SIGNIFICANT CHANGE UP (ref 30–65)
PCO2 BLDMV: 45 MMHG — SIGNIFICANT CHANGE UP (ref 30–65)
PCO2 BLDMV: 46 MMHG — SIGNIFICANT CHANGE UP (ref 30–65)
PCO2 BLDV: 40 MMHG — LOW (ref 42–55)
PCO2 BLDV: 45 MMHG — SIGNIFICANT CHANGE UP (ref 42–55)
PCO2 BLDV: 65 MMHG — HIGH (ref 42–55)
PH BLDMV: 7.1 — CRITICAL LOW (ref 7.32–7.45)
PH BLDMV: 7.28 — LOW (ref 7.32–7.45)
PH BLDMV: 7.31 — LOW (ref 7.32–7.45)
PH BLDMV: 7.32 — SIGNIFICANT CHANGE UP (ref 7.32–7.45)
PH BLDMV: 7.33 — SIGNIFICANT CHANGE UP (ref 7.32–7.45)
PH BLDMV: 7.34 — SIGNIFICANT CHANGE UP (ref 7.32–7.45)
PH BLDMV: 7.36 — SIGNIFICANT CHANGE UP (ref 7.32–7.45)
PH BLDMV: 7.37 — SIGNIFICANT CHANGE UP (ref 7.32–7.45)
PH BLDMV: 7.39 — SIGNIFICANT CHANGE UP (ref 7.32–7.45)
PH BLDMV: 7.4 — SIGNIFICANT CHANGE UP (ref 7.32–7.45)
PH BLDMV: 7.41 — SIGNIFICANT CHANGE UP (ref 7.32–7.45)
PH BLDMV: 7.41 — SIGNIFICANT CHANGE UP (ref 7.32–7.45)
PH BLDV: 7.04 — CRITICAL LOW (ref 7.32–7.43)
PH BLDV: 7.31 — LOW (ref 7.32–7.43)
PH BLDV: 7.37 — SIGNIFICANT CHANGE UP (ref 7.32–7.43)
PH UR: 6 — SIGNIFICANT CHANGE UP (ref 5–8)
PHOSPHATE SERPL-MCNC: 2.5 MG/DL — SIGNIFICANT CHANGE UP (ref 2.5–4.5)
PHOSPHATE SERPL-MCNC: 2.7 MG/DL — SIGNIFICANT CHANGE UP (ref 2.5–4.5)
PHOSPHATE SERPL-MCNC: 2.8 MG/DL — SIGNIFICANT CHANGE UP (ref 2.5–4.5)
PHOSPHATE SERPL-MCNC: 3.2 MG/DL — SIGNIFICANT CHANGE UP (ref 2.5–4.5)
PHOSPHATE SERPL-MCNC: 3.4 MG/DL — SIGNIFICANT CHANGE UP (ref 2.5–4.5)
PHOSPHATE SERPL-MCNC: 3.6 MG/DL — SIGNIFICANT CHANGE UP (ref 2.5–4.5)
PHOSPHATE SERPL-MCNC: 4.2 MG/DL — SIGNIFICANT CHANGE UP (ref 2.5–4.5)
PHOSPHATE SERPL-MCNC: 5.6 MG/DL — HIGH (ref 2.5–4.5)
PHOSPHATE SERPL-MCNC: 5.7 MG/DL — HIGH (ref 2.5–4.5)
PHOSPHATE SERPL-MCNC: 5.8 MG/DL — HIGH (ref 2.5–4.5)
PHOSPHATE SERPL-MCNC: 6.5 MG/DL — HIGH (ref 2.5–4.5)
PHOSPHATE SERPL-MCNC: 7 MG/DL — HIGH (ref 2.5–4.5)
PHOSPHATE SERPL-MCNC: 7.1 MG/DL — HIGH (ref 2.5–4.5)
PHOSPHATE SERPL-MCNC: 7.5 MG/DL — HIGH (ref 2.5–4.5)
PHOSPHATE SERPL-MCNC: 8.8 MG/DL — HIGH (ref 2.5–4.5)
PLAT MORPH BLD: NORMAL — SIGNIFICANT CHANGE UP
PLATELET # BLD AUTO: 32 K/UL — LOW (ref 150–400)
PLATELET # BLD AUTO: 33 K/UL — LOW (ref 150–400)
PLATELET # BLD AUTO: 33 K/UL — LOW (ref 150–400)
PLATELET # BLD AUTO: 34 K/UL — LOW (ref 150–400)
PLATELET # BLD AUTO: 40 K/UL — LOW (ref 150–400)
PLATELET # BLD AUTO: 42 K/UL — LOW (ref 150–400)
PLATELET # BLD AUTO: 46 K/UL — LOW (ref 150–400)
PLATELET # BLD AUTO: 51 K/UL — LOW (ref 150–400)
PLATELET # BLD AUTO: 55 K/UL — LOW (ref 150–400)
PLATELET # BLD AUTO: 57 K/UL — LOW (ref 150–400)
PLATELET # BLD AUTO: 60 K/UL — LOW (ref 150–400)
PLATELET # BLD AUTO: 60 K/UL — LOW (ref 150–400)
PLATELET # BLD AUTO: 81 K/UL — LOW (ref 150–400)
PO2 BLDV: 17 MMHG — LOW (ref 25–45)
PO2 BLDV: 37 MMHG — SIGNIFICANT CHANGE UP (ref 25–45)
PO2 BLDV: 44 MMHG — SIGNIFICANT CHANGE UP (ref 25–45)
POIKILOCYTOSIS BLD QL AUTO: SIGNIFICANT CHANGE UP
POTASSIUM BLDV-SCNC: 3.7 MMOL/L — SIGNIFICANT CHANGE UP (ref 3.5–5.1)
POTASSIUM BLDV-SCNC: 4.3 MMOL/L — SIGNIFICANT CHANGE UP (ref 3.5–5.1)
POTASSIUM BLDV-SCNC: 5.2 MMOL/L — HIGH (ref 3.5–5.1)
POTASSIUM SERPL-MCNC: 3.5 MMOL/L — SIGNIFICANT CHANGE UP (ref 3.5–5.3)
POTASSIUM SERPL-MCNC: 3.6 MMOL/L — SIGNIFICANT CHANGE UP (ref 3.5–5.3)
POTASSIUM SERPL-MCNC: 3.7 MMOL/L — SIGNIFICANT CHANGE UP (ref 3.5–5.3)
POTASSIUM SERPL-MCNC: 3.8 MMOL/L — SIGNIFICANT CHANGE UP (ref 3.5–5.3)
POTASSIUM SERPL-MCNC: 3.9 MMOL/L — SIGNIFICANT CHANGE UP (ref 3.5–5.3)
POTASSIUM SERPL-MCNC: 3.9 MMOL/L — SIGNIFICANT CHANGE UP (ref 3.5–5.3)
POTASSIUM SERPL-MCNC: 4 MMOL/L — SIGNIFICANT CHANGE UP (ref 3.5–5.3)
POTASSIUM SERPL-MCNC: 4.1 MMOL/L — SIGNIFICANT CHANGE UP (ref 3.5–5.3)
POTASSIUM SERPL-MCNC: 4.1 MMOL/L — SIGNIFICANT CHANGE UP (ref 3.5–5.3)
POTASSIUM SERPL-MCNC: 4.2 MMOL/L — SIGNIFICANT CHANGE UP (ref 3.5–5.3)
POTASSIUM SERPL-MCNC: 4.3 MMOL/L — SIGNIFICANT CHANGE UP (ref 3.5–5.3)
POTASSIUM SERPL-MCNC: 4.5 MMOL/L — SIGNIFICANT CHANGE UP (ref 3.5–5.3)
POTASSIUM SERPL-MCNC: 4.9 MMOL/L — SIGNIFICANT CHANGE UP (ref 3.5–5.3)
POTASSIUM SERPL-MCNC: 5.5 MMOL/L — HIGH (ref 3.5–5.3)
POTASSIUM SERPL-MCNC: 7.9 MMOL/L — CRITICAL HIGH (ref 3.5–5.3)
POTASSIUM SERPL-SCNC: 3.5 MMOL/L — SIGNIFICANT CHANGE UP (ref 3.5–5.3)
POTASSIUM SERPL-SCNC: 3.6 MMOL/L — SIGNIFICANT CHANGE UP (ref 3.5–5.3)
POTASSIUM SERPL-SCNC: 3.7 MMOL/L — SIGNIFICANT CHANGE UP (ref 3.5–5.3)
POTASSIUM SERPL-SCNC: 3.8 MMOL/L — SIGNIFICANT CHANGE UP (ref 3.5–5.3)
POTASSIUM SERPL-SCNC: 3.9 MMOL/L — SIGNIFICANT CHANGE UP (ref 3.5–5.3)
POTASSIUM SERPL-SCNC: 3.9 MMOL/L — SIGNIFICANT CHANGE UP (ref 3.5–5.3)
POTASSIUM SERPL-SCNC: 4 MMOL/L — SIGNIFICANT CHANGE UP (ref 3.5–5.3)
POTASSIUM SERPL-SCNC: 4.1 MMOL/L — SIGNIFICANT CHANGE UP (ref 3.5–5.3)
POTASSIUM SERPL-SCNC: 4.1 MMOL/L — SIGNIFICANT CHANGE UP (ref 3.5–5.3)
POTASSIUM SERPL-SCNC: 4.2 MMOL/L — SIGNIFICANT CHANGE UP (ref 3.5–5.3)
POTASSIUM SERPL-SCNC: 4.3 MMOL/L — SIGNIFICANT CHANGE UP (ref 3.5–5.3)
POTASSIUM SERPL-SCNC: 4.5 MMOL/L — SIGNIFICANT CHANGE UP (ref 3.5–5.3)
POTASSIUM SERPL-SCNC: 4.9 MMOL/L — SIGNIFICANT CHANGE UP (ref 3.5–5.3)
POTASSIUM SERPL-SCNC: 5.5 MMOL/L — HIGH (ref 3.5–5.3)
POTASSIUM SERPL-SCNC: 7.9 MMOL/L — CRITICAL HIGH (ref 3.5–5.3)
PROCALCITONIN SERPL-MCNC: 0.85 NG/ML — HIGH (ref 0.02–0.1)
PROT SERPL-MCNC: 4.2 G/DL — LOW (ref 6–8.3)
PROT SERPL-MCNC: 4.3 G/DL — LOW (ref 6–8.3)
PROT SERPL-MCNC: 4.3 G/DL — LOW (ref 6–8.3)
PROT SERPL-MCNC: 4.4 G/DL — LOW (ref 6–8.3)
PROT SERPL-MCNC: 4.4 G/DL — LOW (ref 6–8.3)
PROT SERPL-MCNC: 4.5 G/DL — LOW (ref 6–8.3)
PROT SERPL-MCNC: 4.5 G/DL — LOW (ref 6–8.3)
PROT SERPL-MCNC: 4.6 G/DL — LOW (ref 6–8.3)
PROT SERPL-MCNC: 4.7 G/DL — LOW (ref 6–8.3)
PROT SERPL-MCNC: 4.7 G/DL — LOW (ref 6–8.3)
PROT SERPL-MCNC: 4.8 G/DL — LOW (ref 6–8.3)
PROT SERPL-MCNC: 4.9 G/DL — LOW (ref 6–8.3)
PROT SERPL-MCNC: 5 G/DL — LOW (ref 6–8.3)
PROT SERPL-MCNC: 7.7 G/DL — SIGNIFICANT CHANGE UP (ref 6–8.3)
PROT UR-MCNC: ABNORMAL
PROTHROM AB SERPL-ACNC: 26.3 SEC — HIGH (ref 10.5–13.4)
PROTHROM AB SERPL-ACNC: 29.2 SEC — HIGH (ref 10.5–13.4)
PROTHROM AB SERPL-ACNC: 30.3 SEC — HIGH (ref 10.5–13.4)
PROTHROM AB SERPL-ACNC: 31.7 SEC — HIGH (ref 10.5–13.4)
PROTHROM AB SERPL-ACNC: 33.2 SEC — HIGH (ref 10.5–13.4)
PROTHROM AB SERPL-ACNC: 33.9 SEC — HIGH (ref 10.5–13.4)
PROTHROM AB SERPL-ACNC: 35.7 SEC — HIGH (ref 10.5–13.4)
PROTHROM AB SERPL-ACNC: 37.5 SEC — HIGH (ref 10.5–13.4)
PROTHROM AB SERPL-ACNC: 40.8 SEC — HIGH (ref 10.5–13.4)
PROTHROM AB SERPL-ACNC: 42.2 SEC — HIGH (ref 10.5–13.4)
RAPID RVP RESULT: DETECTED
RBC # BLD: 3.5 M/UL — LOW (ref 4.2–5.8)
RBC # BLD: 3.7 M/UL — LOW (ref 4.2–5.8)
RBC # BLD: 3.74 M/UL — LOW (ref 4.2–5.8)
RBC # BLD: 3.85 M/UL — LOW (ref 4.2–5.8)
RBC # BLD: 3.92 M/UL — LOW (ref 4.2–5.8)
RBC # BLD: 3.95 M/UL — LOW (ref 4.2–5.8)
RBC # BLD: 4.25 M/UL — SIGNIFICANT CHANGE UP (ref 4.2–5.8)
RBC # BLD: 4.33 M/UL — SIGNIFICANT CHANGE UP (ref 4.2–5.8)
RBC # BLD: 4.4 M/UL — SIGNIFICANT CHANGE UP (ref 4.2–5.8)
RBC # BLD: 4.56 M/UL — SIGNIFICANT CHANGE UP (ref 4.2–5.8)
RBC # BLD: 4.58 M/UL — SIGNIFICANT CHANGE UP (ref 4.2–5.8)
RBC # BLD: 4.86 M/UL — SIGNIFICANT CHANGE UP (ref 4.2–5.8)
RBC # BLD: 6.22 M/UL — HIGH (ref 4.2–5.8)
RBC # FLD: 16.1 % — HIGH (ref 10.3–14.5)
RBC # FLD: 16.2 % — HIGH (ref 10.3–14.5)
RBC # FLD: 16.2 % — HIGH (ref 10.3–14.5)
RBC # FLD: 16.3 % — HIGH (ref 10.3–14.5)
RBC # FLD: 16.4 % — HIGH (ref 10.3–14.5)
RBC # FLD: 16.7 % — HIGH (ref 10.3–14.5)
RBC # FLD: 17.2 % — HIGH (ref 10.3–14.5)
RBC # FLD: 17.2 % — HIGH (ref 10.3–14.5)
RBC # FLD: 17.7 % — HIGH (ref 10.3–14.5)
RBC # FLD: 17.8 % — HIGH (ref 10.3–14.5)
RBC BLD AUTO: ABNORMAL
RBC BLD AUTO: SIGNIFICANT CHANGE UP
RBC CASTS # UR COMP ASSIST: 141 /HPF — HIGH (ref 0–4)
RH IG SCN BLD-IMP: POSITIVE — SIGNIFICANT CHANGE UP
RH IG SCN BLD-IMP: POSITIVE — SIGNIFICANT CHANGE UP
S AUREUS DNA NOSE QL NAA+PROBE: SIGNIFICANT CHANGE UP
SAO2 % BLDMV: 46.4 — LOW (ref 60–90)
SAO2 % BLDMV: 54 — LOW (ref 60–90)
SAO2 % BLDMV: 56.8 — LOW (ref 60–90)
SAO2 % BLDMV: 57.3 — LOW (ref 60–90)
SAO2 % BLDMV: 57.3 — LOW (ref 60–90)
SAO2 % BLDMV: 65.5 — SIGNIFICANT CHANGE UP (ref 60–90)
SAO2 % BLDMV: 67.7 — SIGNIFICANT CHANGE UP (ref 60–90)
SAO2 % BLDMV: 68.3 — SIGNIFICANT CHANGE UP (ref 60–90)
SAO2 % BLDMV: 69 — SIGNIFICANT CHANGE UP (ref 60–90)
SAO2 % BLDMV: 69.2 — SIGNIFICANT CHANGE UP (ref 60–90)
SAO2 % BLDMV: 69.8 — SIGNIFICANT CHANGE UP (ref 60–90)
SAO2 % BLDMV: 70.1 — SIGNIFICANT CHANGE UP (ref 60–90)
SAO2 % BLDMV: 70.8 — SIGNIFICANT CHANGE UP (ref 60–90)
SAO2 % BLDMV: 72.8 — SIGNIFICANT CHANGE UP (ref 60–90)
SAO2 % BLDMV: 73.1 — SIGNIFICANT CHANGE UP (ref 60–90)
SAO2 % BLDMV: 73.2 — SIGNIFICANT CHANGE UP (ref 60–90)
SAO2 % BLDMV: 73.6 — SIGNIFICANT CHANGE UP (ref 60–90)
SAO2 % BLDMV: 74.3 — SIGNIFICANT CHANGE UP (ref 60–90)
SAO2 % BLDMV: 74.9 — SIGNIFICANT CHANGE UP (ref 60–90)
SAO2 % BLDMV: 79.1 — SIGNIFICANT CHANGE UP (ref 60–90)
SAO2 % BLDMV: 81.9 — SIGNIFICANT CHANGE UP (ref 60–90)
SAO2 % BLDV: 11.2 % — LOW (ref 67–88)
SAO2 % BLDV: 54.8 % — LOW (ref 67–88)
SAO2 % BLDV: 71.5 % — SIGNIFICANT CHANGE UP (ref 67–88)
SARS-COV-2 RNA SPEC QL NAA+PROBE: SIGNIFICANT CHANGE UP
SMUDGE CELLS # BLD: PRESENT — SIGNIFICANT CHANGE UP
SODIUM SERPL-SCNC: 132 MMOL/L — LOW (ref 135–145)
SODIUM SERPL-SCNC: 136 MMOL/L — SIGNIFICANT CHANGE UP (ref 135–145)
SODIUM SERPL-SCNC: 136 MMOL/L — SIGNIFICANT CHANGE UP (ref 135–145)
SODIUM SERPL-SCNC: 137 MMOL/L — SIGNIFICANT CHANGE UP (ref 135–145)
SODIUM SERPL-SCNC: 137 MMOL/L — SIGNIFICANT CHANGE UP (ref 135–145)
SODIUM SERPL-SCNC: 138 MMOL/L — SIGNIFICANT CHANGE UP (ref 135–145)
SODIUM SERPL-SCNC: 139 MMOL/L — SIGNIFICANT CHANGE UP (ref 135–145)
SODIUM SERPL-SCNC: 140 MMOL/L — SIGNIFICANT CHANGE UP (ref 135–145)
SODIUM SERPL-SCNC: 142 MMOL/L — SIGNIFICANT CHANGE UP (ref 135–145)
SODIUM SERPL-SCNC: 144 MMOL/L — SIGNIFICANT CHANGE UP (ref 135–145)
SODIUM SERPL-SCNC: 145 MMOL/L — SIGNIFICANT CHANGE UP (ref 135–145)
SODIUM SERPL-SCNC: 145 MMOL/L — SIGNIFICANT CHANGE UP (ref 135–145)
SODIUM SERPL-SCNC: 146 MMOL/L — HIGH (ref 135–145)
SODIUM SERPL-SCNC: 147 MMOL/L — HIGH (ref 135–145)
SODIUM SERPL-SCNC: 147 MMOL/L — HIGH (ref 135–145)
SODIUM SERPL-SCNC: 148 MMOL/L — HIGH (ref 135–145)
SODIUM SERPL-SCNC: 149 MMOL/L — HIGH (ref 135–145)
SODIUM SERPL-SCNC: 152 MMOL/L — HIGH (ref 135–145)
SP GR SPEC: 1.02 — SIGNIFICANT CHANGE UP (ref 1.01–1.02)
SPECIMEN SOURCE: SIGNIFICANT CHANGE UP
TARGETS BLD QL SMEAR: SLIGHT — SIGNIFICANT CHANGE UP
TRIGL SERPL-MCNC: 77 MG/DL — SIGNIFICANT CHANGE UP
TROPONIN T, HIGH SENSITIVITY RESULT: 283 NG/L — HIGH (ref 0–51)
TROPONIN T, HIGH SENSITIVITY RESULT: 446 NG/L — HIGH (ref 0–51)
TROPONIN T, HIGH SENSITIVITY RESULT: 597 NG/L — HIGH (ref 0–51)
TROPONIN T, HIGH SENSITIVITY RESULT: 807 NG/L — HIGH (ref 0–51)
TROPONIN T, HIGH SENSITIVITY RESULT: 840 NG/L — HIGH (ref 0–51)
TROPONIN T, HIGH SENSITIVITY RESULT: 99 NG/L — HIGH (ref 0–51)
TSH SERPL-MCNC: 4.14 UIU/ML — SIGNIFICANT CHANGE UP (ref 0.27–4.2)
UROBILINOGEN FLD QL: SIGNIFICANT CHANGE UP
VANCOMYCIN TROUGH SERPL-MCNC: 21.2 UG/ML — HIGH (ref 10–20)
WBC # BLD: 10.81 K/UL — HIGH (ref 3.8–10.5)
WBC # BLD: 11.21 K/UL — HIGH (ref 3.8–10.5)
WBC # BLD: 11.54 K/UL — HIGH (ref 3.8–10.5)
WBC # BLD: 11.83 K/UL — HIGH (ref 3.8–10.5)
WBC # BLD: 13.51 K/UL — HIGH (ref 3.8–10.5)
WBC # BLD: 14.56 K/UL — HIGH (ref 3.8–10.5)
WBC # BLD: 14.93 K/UL — HIGH (ref 3.8–10.5)
WBC # BLD: 7.36 K/UL — SIGNIFICANT CHANGE UP (ref 3.8–10.5)
WBC # BLD: 7.8 K/UL — SIGNIFICANT CHANGE UP (ref 3.8–10.5)
WBC # BLD: 8.2 K/UL — SIGNIFICANT CHANGE UP (ref 3.8–10.5)
WBC # BLD: 8.39 K/UL — SIGNIFICANT CHANGE UP (ref 3.8–10.5)
WBC # BLD: 8.78 K/UL — SIGNIFICANT CHANGE UP (ref 3.8–10.5)
WBC # BLD: 9.98 K/UL — SIGNIFICANT CHANGE UP (ref 3.8–10.5)
WBC # FLD AUTO: 10.81 K/UL — HIGH (ref 3.8–10.5)
WBC # FLD AUTO: 11.21 K/UL — HIGH (ref 3.8–10.5)
WBC # FLD AUTO: 11.54 K/UL — HIGH (ref 3.8–10.5)
WBC # FLD AUTO: 11.83 K/UL — HIGH (ref 3.8–10.5)
WBC # FLD AUTO: 13.51 K/UL — HIGH (ref 3.8–10.5)
WBC # FLD AUTO: 14.56 K/UL — HIGH (ref 3.8–10.5)
WBC # FLD AUTO: 14.93 K/UL — HIGH (ref 3.8–10.5)
WBC # FLD AUTO: 7.36 K/UL — SIGNIFICANT CHANGE UP (ref 3.8–10.5)
WBC # FLD AUTO: 7.8 K/UL — SIGNIFICANT CHANGE UP (ref 3.8–10.5)
WBC # FLD AUTO: 8.2 K/UL — SIGNIFICANT CHANGE UP (ref 3.8–10.5)
WBC # FLD AUTO: 8.39 K/UL — SIGNIFICANT CHANGE UP (ref 3.8–10.5)
WBC # FLD AUTO: 8.78 K/UL — SIGNIFICANT CHANGE UP (ref 3.8–10.5)
WBC # FLD AUTO: 9.98 K/UL — SIGNIFICANT CHANGE UP (ref 3.8–10.5)
WBC UR QL: 2 /HPF — SIGNIFICANT CHANGE UP (ref 0–5)

## 2022-01-01 PROCEDURE — 76700 US EXAM ABDOM COMPLETE: CPT | Mod: 26

## 2022-01-01 PROCEDURE — 94002 VENT MGMT INPAT INIT DAY: CPT

## 2022-01-01 PROCEDURE — 93451 RIGHT HEART CATH: CPT | Mod: 26

## 2022-01-01 PROCEDURE — 93306 TTE W/DOPPLER COMPLETE: CPT | Mod: 26

## 2022-01-01 PROCEDURE — 85610 PROTHROMBIN TIME: CPT

## 2022-01-01 PROCEDURE — 93970 EXTREMITY STUDY: CPT

## 2022-01-01 PROCEDURE — 76705 ECHO EXAM OF ABDOMEN: CPT | Mod: 26

## 2022-01-01 PROCEDURE — 33990 INSJ PERQ VAD L HRT ARTERIAL: CPT

## 2022-01-01 PROCEDURE — P9045: CPT

## 2022-01-01 PROCEDURE — 99292 CRITICAL CARE ADDL 30 MIN: CPT | Mod: 25

## 2022-01-01 PROCEDURE — 74018 RADEX ABDOMEN 1 VIEW: CPT | Mod: 26

## 2022-01-01 PROCEDURE — 90945 DIALYSIS ONE EVALUATION: CPT | Mod: GC

## 2022-01-01 PROCEDURE — 83036 HEMOGLOBIN GLYCOSYLATED A1C: CPT

## 2022-01-01 PROCEDURE — 80053 COMPREHEN METABOLIC PANEL: CPT

## 2022-01-01 PROCEDURE — 94660 CPAP INITIATION&MGMT: CPT

## 2022-01-01 PROCEDURE — 82962 GLUCOSE BLOOD TEST: CPT

## 2022-01-01 PROCEDURE — 93282 PRGRMG EVAL IMPLANTABLE DFB: CPT | Mod: 26

## 2022-01-01 PROCEDURE — 87449 NOS EACH ORGANISM AG IA: CPT

## 2022-01-01 PROCEDURE — 87507 IADNA-DNA/RNA PROBE TQ 12-25: CPT

## 2022-01-01 PROCEDURE — 87641 MR-STAPH DNA AMP PROBE: CPT

## 2022-01-01 PROCEDURE — 99223 1ST HOSP IP/OBS HIGH 75: CPT

## 2022-01-01 PROCEDURE — 76700 US EXAM ABDOM COMPLETE: CPT

## 2022-01-01 PROCEDURE — 31500 INSERT EMERGENCY AIRWAY: CPT

## 2022-01-01 PROCEDURE — 82550 ASSAY OF CK (CPK): CPT

## 2022-01-01 PROCEDURE — 83605 ASSAY OF LACTIC ACID: CPT

## 2022-01-01 PROCEDURE — 71045 X-RAY EXAM CHEST 1 VIEW: CPT | Mod: 26

## 2022-01-01 PROCEDURE — 93005 ELECTROCARDIOGRAM TRACING: CPT

## 2022-01-01 PROCEDURE — C8929: CPT

## 2022-01-01 PROCEDURE — 71045 X-RAY EXAM CHEST 1 VIEW: CPT

## 2022-01-01 PROCEDURE — 85730 THROMBOPLASTIN TIME PARTIAL: CPT

## 2022-01-01 PROCEDURE — C1769: CPT

## 2022-01-01 PROCEDURE — P9011: CPT

## 2022-01-01 PROCEDURE — 93970 EXTREMITY STUDY: CPT | Mod: 26

## 2022-01-01 PROCEDURE — 99291 CRITICAL CARE FIRST HOUR: CPT

## 2022-01-01 PROCEDURE — 93451 RIGHT HEART CATH: CPT

## 2022-01-01 PROCEDURE — 86900 BLOOD TYPING SEROLOGIC ABO: CPT

## 2022-01-01 PROCEDURE — 76705 ECHO EXAM OF ABDOMEN: CPT

## 2022-01-01 PROCEDURE — 85025 COMPLETE CBC W/AUTO DIFF WBC: CPT

## 2022-01-01 PROCEDURE — 99233 SBSQ HOSP IP/OBS HIGH 50: CPT

## 2022-01-01 PROCEDURE — 99292 CRITICAL CARE ADDL 30 MIN: CPT

## 2022-01-01 PROCEDURE — C1751: CPT

## 2022-01-01 PROCEDURE — 96374 THER/PROPH/DIAG INJ IV PUSH: CPT | Mod: XU

## 2022-01-01 PROCEDURE — 74018 RADEX ABDOMEN 1 VIEW: CPT

## 2022-01-01 PROCEDURE — 82565 ASSAY OF CREATININE: CPT

## 2022-01-01 PROCEDURE — 86965 POOLING BLOOD PLATELETS: CPT

## 2022-01-01 PROCEDURE — 96375 TX/PRO/DX INJ NEW DRUG ADDON: CPT | Mod: XU

## 2022-01-01 PROCEDURE — 85018 HEMOGLOBIN: CPT

## 2022-01-01 PROCEDURE — 84145 PROCALCITONIN (PCT): CPT

## 2022-01-01 PROCEDURE — 36415 COLL VENOUS BLD VENIPUNCTURE: CPT

## 2022-01-01 PROCEDURE — 85379 FIBRIN DEGRADATION QUANT: CPT

## 2022-01-01 PROCEDURE — 86901 BLOOD TYPING SEROLOGIC RH(D): CPT

## 2022-01-01 PROCEDURE — 99497 ADVNCD CARE PLAN 30 MIN: CPT | Mod: 25

## 2022-01-01 PROCEDURE — 86803 HEPATITIS C AB TEST: CPT

## 2022-01-01 PROCEDURE — 83735 ASSAY OF MAGNESIUM: CPT

## 2022-01-01 PROCEDURE — C1760: CPT

## 2022-01-01 PROCEDURE — 84443 ASSAY THYROID STIM HORMONE: CPT

## 2022-01-01 PROCEDURE — 94003 VENT MGMT INPAT SUBQ DAY: CPT

## 2022-01-01 PROCEDURE — P9012: CPT

## 2022-01-01 PROCEDURE — 87070 CULTURE OTHR SPECIMN AEROBIC: CPT

## 2022-01-01 PROCEDURE — 99291 CRITICAL CARE FIRST HOUR: CPT | Mod: 25

## 2022-01-01 PROCEDURE — 93010 ELECTROCARDIOGRAM REPORT: CPT

## 2022-01-01 PROCEDURE — 87186 SC STD MICRODIL/AGAR DIL: CPT

## 2022-01-01 PROCEDURE — 84295 ASSAY OF SERUM SODIUM: CPT

## 2022-01-01 PROCEDURE — 93010 ELECTROCARDIOGRAM REPORT: CPT | Mod: 76

## 2022-01-01 PROCEDURE — 36556 INSERT NON-TUNNEL CV CATH: CPT

## 2022-01-01 PROCEDURE — C1889: CPT

## 2022-01-01 PROCEDURE — 76937 US GUIDE VASCULAR ACCESS: CPT | Mod: 26

## 2022-01-01 PROCEDURE — 71045 X-RAY EXAM CHEST 1 VIEW: CPT | Mod: 26,76

## 2022-01-01 PROCEDURE — 85014 HEMATOCRIT: CPT

## 2022-01-01 PROCEDURE — 81001 URINALYSIS AUTO W/SCOPE: CPT

## 2022-01-01 PROCEDURE — 82947 ASSAY GLUCOSE BLOOD QUANT: CPT

## 2022-01-01 PROCEDURE — 82435 ASSAY OF BLOOD CHLORIDE: CPT

## 2022-01-01 PROCEDURE — 82330 ASSAY OF CALCIUM: CPT

## 2022-01-01 PROCEDURE — C1894: CPT

## 2022-01-01 PROCEDURE — 99223 1ST HOSP IP/OBS HIGH 75: CPT | Mod: GC

## 2022-01-01 PROCEDURE — 83880 ASSAY OF NATRIURETIC PEPTIDE: CPT

## 2022-01-01 PROCEDURE — 85384 FIBRINOGEN ACTIVITY: CPT

## 2022-01-01 PROCEDURE — 83010 ASSAY OF HAPTOGLOBIN QUANT: CPT

## 2022-01-01 PROCEDURE — 87640 STAPH A DNA AMP PROBE: CPT

## 2022-01-01 PROCEDURE — 87324 CLOSTRIDIUM AG IA: CPT

## 2022-01-01 PROCEDURE — 99291 CRITICAL CARE FIRST HOUR: CPT | Mod: FT,25

## 2022-01-01 PROCEDURE — 82553 CREATINE MB FRACTION: CPT

## 2022-01-01 PROCEDURE — 83615 LACTATE (LD) (LDH) ENZYME: CPT

## 2022-01-01 PROCEDURE — P9100: CPT

## 2022-01-01 PROCEDURE — 0225U NFCT DS DNA&RNA 21 SARSCOV2: CPT

## 2022-01-01 PROCEDURE — 84100 ASSAY OF PHOSPHORUS: CPT

## 2022-01-01 PROCEDURE — 84484 ASSAY OF TROPONIN QUANT: CPT

## 2022-01-01 PROCEDURE — 86985 SPLIT BLOOD OR PRODUCTS: CPT

## 2022-01-01 PROCEDURE — 80202 ASSAY OF VANCOMYCIN: CPT

## 2022-01-01 PROCEDURE — C1887: CPT

## 2022-01-01 PROCEDURE — 80061 LIPID PANEL: CPT

## 2022-01-01 PROCEDURE — 36600 WITHDRAWAL OF ARTERIAL BLOOD: CPT

## 2022-01-01 PROCEDURE — 71045 X-RAY EXAM CHEST 1 VIEW: CPT | Mod: 26,77

## 2022-01-01 PROCEDURE — 84132 ASSAY OF SERUM POTASSIUM: CPT

## 2022-01-01 PROCEDURE — 82140 ASSAY OF AMMONIA: CPT

## 2022-01-01 PROCEDURE — 82803 BLOOD GASES ANY COMBINATION: CPT

## 2022-01-01 PROCEDURE — 86850 RBC ANTIBODY SCREEN: CPT

## 2022-01-01 PROCEDURE — 36430 TRANSFUSION BLD/BLD COMPNT: CPT

## 2022-01-01 PROCEDURE — P9037: CPT

## 2022-01-01 PROCEDURE — 87040 BLOOD CULTURE FOR BACTERIA: CPT

## 2022-01-01 RX ORDER — DEXTROSE 50 % IN WATER 50 %
15 SYRINGE (ML) INTRAVENOUS ONCE
Refills: 0 | Status: DISCONTINUED | OUTPATIENT
Start: 2022-01-01 | End: 2022-01-01

## 2022-01-01 RX ORDER — LACTULOSE 10 G/15ML
200 SOLUTION ORAL ONCE
Refills: 0 | Status: COMPLETED | OUTPATIENT
Start: 2022-01-01 | End: 2022-01-01

## 2022-01-01 RX ORDER — CALCIUM GLUCONATE 100 MG/ML
2 VIAL (ML) INTRAVENOUS ONCE
Refills: 0 | Status: COMPLETED | OUTPATIENT
Start: 2022-01-01 | End: 2022-01-01

## 2022-01-01 RX ORDER — HEPARIN SODIUM 5000 [USP'U]/ML
5000 INJECTION INTRAVENOUS; SUBCUTANEOUS EVERY 8 HOURS
Refills: 0 | Status: DISCONTINUED | OUTPATIENT
Start: 2022-01-01 | End: 2022-01-01

## 2022-01-01 RX ORDER — PROPOFOL 10 MG/ML
30 INJECTION, EMULSION INTRAVENOUS
Qty: 1000 | Refills: 0 | Status: DISCONTINUED | OUTPATIENT
Start: 2022-01-01 | End: 2022-01-01

## 2022-01-01 RX ORDER — FENTANYL CITRATE 50 UG/ML
50 INJECTION INTRAVENOUS ONCE
Refills: 0 | Status: DISCONTINUED | OUTPATIENT
Start: 2022-01-01 | End: 2022-01-01

## 2022-01-01 RX ORDER — DEXTROSE 50 % IN WATER 50 %
12.5 SYRINGE (ML) INTRAVENOUS ONCE
Refills: 0 | Status: DISCONTINUED | OUTPATIENT
Start: 2022-01-01 | End: 2022-01-01

## 2022-01-01 RX ORDER — DEXTROSE 50 % IN WATER 50 %
50 SYRINGE (ML) INTRAVENOUS ONCE
Refills: 0 | Status: COMPLETED | OUTPATIENT
Start: 2022-01-01 | End: 2022-01-01

## 2022-01-01 RX ORDER — ACETAMINOPHEN 500 MG
1000 TABLET ORAL ONCE
Refills: 0 | Status: COMPLETED | OUTPATIENT
Start: 2022-01-01 | End: 2022-01-01

## 2022-01-01 RX ORDER — VANCOMYCIN HCL 1 G
1000 VIAL (EA) INTRAVENOUS ONCE
Refills: 0 | Status: COMPLETED | OUTPATIENT
Start: 2022-01-01 | End: 2022-01-01

## 2022-01-01 RX ORDER — CHLORHEXIDINE GLUCONATE 213 G/1000ML
1 SOLUTION TOPICAL
Refills: 0 | Status: DISCONTINUED | OUTPATIENT
Start: 2022-01-01 | End: 2022-01-01

## 2022-01-01 RX ORDER — NOREPINEPHRINE BITARTRATE/D5W 8 MG/250ML
0.5 PLASTIC BAG, INJECTION (ML) INTRAVENOUS
Qty: 16 | Refills: 0 | Status: DISCONTINUED | OUTPATIENT
Start: 2022-01-01 | End: 2022-01-01

## 2022-01-01 RX ORDER — DOBUTAMINE HCL 250MG/20ML
5 VIAL (ML) INTRAVENOUS
Qty: 1000 | Refills: 0 | Status: DISCONTINUED | OUTPATIENT
Start: 2022-01-01 | End: 2022-01-01

## 2022-01-01 RX ORDER — DOBUTAMINE HCL 250MG/20ML
3 VIAL (ML) INTRAVENOUS
Qty: 500 | Refills: 0 | Status: DISCONTINUED | OUTPATIENT
Start: 2022-01-01 | End: 2022-01-01

## 2022-01-01 RX ORDER — HYDROMORPHONE HYDROCHLORIDE 2 MG/ML
0.5 INJECTION INTRAMUSCULAR; INTRAVENOUS; SUBCUTANEOUS EVERY 4 HOURS
Refills: 0 | Status: DISCONTINUED | OUTPATIENT
Start: 2022-01-01 | End: 2022-01-01

## 2022-01-01 RX ORDER — CEFEPIME 1 G/1
2000 INJECTION, POWDER, FOR SOLUTION INTRAMUSCULAR; INTRAVENOUS EVERY 12 HOURS
Refills: 0 | Status: DISCONTINUED | OUTPATIENT
Start: 2022-01-01 | End: 2022-01-01

## 2022-01-01 RX ORDER — POTASSIUM CHLORIDE 20 MEQ
20 PACKET (EA) ORAL ONCE
Refills: 0 | Status: COMPLETED | OUTPATIENT
Start: 2022-01-01 | End: 2022-01-01

## 2022-01-01 RX ORDER — PROPOFOL 10 MG/ML
10 INJECTION, EMULSION INTRAVENOUS
Qty: 1000 | Refills: 0 | Status: DISCONTINUED | OUTPATIENT
Start: 2022-01-01 | End: 2022-01-01

## 2022-01-01 RX ORDER — VANCOMYCIN HCL 1 G
1000 VIAL (EA) INTRAVENOUS EVERY 24 HOURS
Refills: 0 | Status: DISCONTINUED | OUTPATIENT
Start: 2022-01-01 | End: 2022-01-01

## 2022-01-01 RX ORDER — PANTOPRAZOLE SODIUM 20 MG/1
40 TABLET, DELAYED RELEASE ORAL
Refills: 0 | Status: DISCONTINUED | OUTPATIENT
Start: 2022-01-01 | End: 2022-01-01

## 2022-01-01 RX ORDER — SUCCINYLCHOLINE CHLORIDE 100 MG/5ML
100 SYRINGE (ML) INTRAVENOUS ONCE
Refills: 0 | Status: COMPLETED | OUTPATIENT
Start: 2022-01-01 | End: 2022-01-01

## 2022-01-01 RX ORDER — DEXTROSE 50 % IN WATER 50 %
25 SYRINGE (ML) INTRAVENOUS ONCE
Refills: 0 | Status: DISCONTINUED | OUTPATIENT
Start: 2022-01-01 | End: 2022-01-01

## 2022-01-01 RX ORDER — ETOMIDATE 2 MG/ML
20 INJECTION INTRAVENOUS ONCE
Refills: 0 | Status: COMPLETED | OUTPATIENT
Start: 2022-01-01 | End: 2022-01-01

## 2022-01-01 RX ORDER — ASPIRIN/CALCIUM CARB/MAGNESIUM 324 MG
81 TABLET ORAL DAILY
Refills: 0 | Status: DISCONTINUED | OUTPATIENT
Start: 2022-01-01 | End: 2022-01-01

## 2022-01-01 RX ORDER — MULTIVIT-MIN/FERROUS GLUCONATE 9 MG/15 ML
15 LIQUID (ML) ORAL DAILY
Refills: 0 | Status: DISCONTINUED | OUTPATIENT
Start: 2022-01-01 | End: 2022-01-01

## 2022-01-01 RX ORDER — FUROSEMIDE 40 MG
40 TABLET ORAL ONCE
Refills: 0 | Status: COMPLETED | OUTPATIENT
Start: 2022-01-01 | End: 2022-01-01

## 2022-01-01 RX ORDER — DOBUTAMINE HCL 250MG/20ML
5 VIAL (ML) INTRAVENOUS
Qty: 500 | Refills: 0 | Status: DISCONTINUED | OUTPATIENT
Start: 2022-01-01 | End: 2022-01-01

## 2022-01-01 RX ORDER — NOREPINEPHRINE BITARTRATE/D5W 8 MG/250ML
0.05 PLASTIC BAG, INJECTION (ML) INTRAVENOUS
Qty: 8 | Refills: 0 | Status: DISCONTINUED | OUTPATIENT
Start: 2022-01-01 | End: 2022-01-01

## 2022-01-01 RX ORDER — DEXMEDETOMIDINE HYDROCHLORIDE IN 0.9% SODIUM CHLORIDE 4 UG/ML
0.2 INJECTION INTRAVENOUS
Qty: 200 | Refills: 0 | Status: DISCONTINUED | OUTPATIENT
Start: 2022-01-01 | End: 2022-01-01

## 2022-01-01 RX ORDER — SODIUM CHLORIDE 9 MG/ML
500 INJECTION, SOLUTION INTRAVENOUS ONCE
Refills: 0 | Status: COMPLETED | OUTPATIENT
Start: 2022-01-01 | End: 2022-01-01

## 2022-01-01 RX ORDER — CEFEPIME 1 G/1
1000 INJECTION, POWDER, FOR SOLUTION INTRAMUSCULAR; INTRAVENOUS EVERY 24 HOURS
Refills: 0 | Status: DISCONTINUED | OUTPATIENT
Start: 2022-01-01 | End: 2022-01-01

## 2022-01-01 RX ORDER — CHLORHEXIDINE GLUCONATE 213 G/1000ML
15 SOLUTION TOPICAL EVERY 12 HOURS
Refills: 0 | Status: DISCONTINUED | OUTPATIENT
Start: 2022-01-01 | End: 2022-01-01

## 2022-01-01 RX ORDER — HEPARIN SODIUM 5000 [USP'U]/ML
300 INJECTION INTRAVENOUS; SUBCUTANEOUS
Qty: 10000 | Refills: 0 | Status: DISCONTINUED | OUTPATIENT
Start: 2022-01-01 | End: 2022-01-01

## 2022-01-01 RX ORDER — ALBUMIN HUMAN 25 %
250 VIAL (ML) INTRAVENOUS
Refills: 0 | Status: COMPLETED | OUTPATIENT
Start: 2022-01-01 | End: 2022-01-01

## 2022-01-01 RX ORDER — VANCOMYCIN HCL 1 G
VIAL (EA) INTRAVENOUS
Refills: 0 | Status: DISCONTINUED | OUTPATIENT
Start: 2022-01-01 | End: 2022-01-01

## 2022-01-01 RX ORDER — NOREPINEPHRINE BITARTRATE/D5W 8 MG/250ML
0.8 PLASTIC BAG, INJECTION (ML) INTRAVENOUS
Qty: 32 | Refills: 0 | Status: DISCONTINUED | OUTPATIENT
Start: 2022-01-01 | End: 2022-01-01

## 2022-01-01 RX ORDER — ALBUMIN HUMAN 25 %
250 VIAL (ML) INTRAVENOUS ONCE
Refills: 0 | Status: COMPLETED | OUTPATIENT
Start: 2022-01-01 | End: 2022-01-01

## 2022-01-01 RX ORDER — MIDAZOLAM HYDROCHLORIDE 1 MG/ML
0.02 INJECTION, SOLUTION INTRAMUSCULAR; INTRAVENOUS
Qty: 100 | Refills: 0 | Status: DISCONTINUED | OUTPATIENT
Start: 2022-01-01 | End: 2022-01-01

## 2022-01-01 RX ORDER — DIGOXIN 250 MCG
500 TABLET ORAL ONCE
Refills: 0 | Status: COMPLETED | OUTPATIENT
Start: 2022-01-01 | End: 2022-01-01

## 2022-01-01 RX ORDER — SODIUM CHLORIDE 9 MG/ML
250 INJECTION, SOLUTION INTRAVENOUS ONCE
Refills: 0 | Status: COMPLETED | OUTPATIENT
Start: 2022-01-01 | End: 2022-01-01

## 2022-01-01 RX ORDER — PROPOFOL 10 MG/ML
30 INJECTION, EMULSION INTRAVENOUS
Qty: 500 | Refills: 0 | Status: DISCONTINUED | OUTPATIENT
Start: 2022-01-01 | End: 2022-01-01

## 2022-01-01 RX ORDER — INSULIN LISPRO 100/ML
VIAL (ML) SUBCUTANEOUS EVERY 4 HOURS
Refills: 0 | Status: DISCONTINUED | OUTPATIENT
Start: 2022-01-01 | End: 2022-01-01

## 2022-01-01 RX ORDER — BUMETANIDE 0.25 MG/ML
2 INJECTION INTRAMUSCULAR; INTRAVENOUS
Qty: 20 | Refills: 0 | Status: DISCONTINUED | OUTPATIENT
Start: 2022-01-01 | End: 2022-01-01

## 2022-01-01 RX ORDER — FUROSEMIDE 40 MG
80 TABLET ORAL ONCE
Refills: 0 | Status: COMPLETED | OUTPATIENT
Start: 2022-01-01 | End: 2022-01-01

## 2022-01-01 RX ORDER — METOCLOPRAMIDE HCL 10 MG
10 TABLET ORAL THREE TIMES A DAY
Refills: 0 | Status: DISCONTINUED | OUTPATIENT
Start: 2022-01-01 | End: 2022-01-01

## 2022-01-01 RX ORDER — SODIUM CHLORIDE 9 MG/ML
500 INJECTION INTRAMUSCULAR; INTRAVENOUS; SUBCUTANEOUS ONCE
Refills: 0 | Status: COMPLETED | OUTPATIENT
Start: 2022-01-01 | End: 2022-01-01

## 2022-01-01 RX ORDER — SODIUM BICARBONATE 1 MEQ/ML
50 SYRINGE (ML) INTRAVENOUS
Refills: 0 | Status: COMPLETED | OUTPATIENT
Start: 2022-01-01 | End: 2022-01-01

## 2022-01-01 RX ORDER — DIGOXIN 250 MCG
250 TABLET ORAL EVERY 6 HOURS
Refills: 0 | Status: DISCONTINUED | OUTPATIENT
Start: 2022-01-01 | End: 2022-01-01

## 2022-01-01 RX ORDER — CALCIUM GLUCONATE 100 MG/ML
1 VIAL (ML) INTRAVENOUS ONCE
Refills: 0 | Status: COMPLETED | OUTPATIENT
Start: 2022-01-01 | End: 2022-01-01

## 2022-01-01 RX ORDER — GLUCAGON INJECTION, SOLUTION 0.5 MG/.1ML
1 INJECTION, SOLUTION SUBCUTANEOUS ONCE
Refills: 0 | Status: DISCONTINUED | OUTPATIENT
Start: 2022-01-01 | End: 2022-01-01

## 2022-01-01 RX ORDER — ATORVASTATIN CALCIUM 80 MG/1
80 TABLET, FILM COATED ORAL AT BEDTIME
Refills: 0 | Status: DISCONTINUED | OUTPATIENT
Start: 2022-01-01 | End: 2022-01-01

## 2022-01-01 RX ORDER — NOREPINEPHRINE BITARTRATE/D5W 8 MG/250ML
1 PLASTIC BAG, INJECTION (ML) INTRAVENOUS
Qty: 32 | Refills: 0 | Status: DISCONTINUED | OUTPATIENT
Start: 2022-01-01 | End: 2022-01-01

## 2022-01-01 RX ORDER — HEPARIN SODIUM 5000 [USP'U]/ML
INJECTION INTRAVENOUS; SUBCUTANEOUS
Qty: 25000 | Refills: 0 | Status: DISCONTINUED | OUTPATIENT
Start: 2022-01-01 | End: 2022-01-01

## 2022-01-01 RX ORDER — AMIODARONE HYDROCHLORIDE 400 MG/1
300 TABLET ORAL ONCE
Refills: 0 | Status: COMPLETED | OUTPATIENT
Start: 2022-01-01 | End: 2022-01-01

## 2022-01-01 RX ORDER — DEXTROSE 10 % IN WATER 10 %
1000 INTRAVENOUS SOLUTION INTRAVENOUS
Refills: 0 | Status: DISCONTINUED | OUTPATIENT
Start: 2022-01-01 | End: 2022-01-01

## 2022-01-01 RX ORDER — MIDAZOLAM HYDROCHLORIDE 1 MG/ML
2 INJECTION, SOLUTION INTRAMUSCULAR; INTRAVENOUS ONCE
Refills: 0 | Status: DISCONTINUED | OUTPATIENT
Start: 2022-01-01 | End: 2022-01-01

## 2022-01-01 RX ORDER — VANCOMYCIN HCL 1 G
1250 VIAL (EA) INTRAVENOUS ONCE
Refills: 0 | Status: COMPLETED | OUTPATIENT
Start: 2022-01-01 | End: 2022-01-01

## 2022-01-01 RX ORDER — INSULIN LISPRO 100/ML
VIAL (ML) SUBCUTANEOUS
Refills: 0 | Status: DISCONTINUED | OUTPATIENT
Start: 2022-01-01 | End: 2022-01-01

## 2022-01-01 RX ORDER — FUROSEMIDE 40 MG
80 TABLET ORAL ONCE
Refills: 0 | Status: DISCONTINUED | OUTPATIENT
Start: 2022-01-01 | End: 2022-01-01

## 2022-01-01 RX ORDER — BUMETANIDE 0.25 MG/ML
4 INJECTION INTRAMUSCULAR; INTRAVENOUS ONCE
Refills: 0 | Status: COMPLETED | OUTPATIENT
Start: 2022-01-01 | End: 2022-01-01

## 2022-01-01 RX ORDER — MILRINONE LACTATE 1 MG/ML
0.12 INJECTION, SOLUTION INTRAVENOUS
Qty: 20 | Refills: 0 | Status: DISCONTINUED | OUTPATIENT
Start: 2022-01-01 | End: 2022-01-01

## 2022-01-01 RX ORDER — VASOPRESSIN 20 [USP'U]/ML
0.1 INJECTION INTRAVENOUS
Qty: 50 | Refills: 0 | Status: DISCONTINUED | OUTPATIENT
Start: 2022-01-01 | End: 2022-01-01

## 2022-01-01 RX ORDER — PANTOPRAZOLE SODIUM 20 MG/1
40 TABLET, DELAYED RELEASE ORAL DAILY
Refills: 0 | Status: DISCONTINUED | OUTPATIENT
Start: 2022-01-01 | End: 2022-01-01

## 2022-01-01 RX ORDER — MILRINONE LACTATE 1 MG/ML
0.38 INJECTION, SOLUTION INTRAVENOUS
Qty: 20 | Refills: 0 | Status: DISCONTINUED | OUTPATIENT
Start: 2022-01-01 | End: 2022-01-01

## 2022-01-01 RX ADMIN — Medication 1000 MILLIGRAM(S): at 22:00

## 2022-01-01 RX ADMIN — CHLORHEXIDINE GLUCONATE 15 MILLILITER(S): 213 SOLUTION TOPICAL at 05:48

## 2022-01-01 RX ADMIN — Medication 6.89 MICROGRAM(S)/KG/MIN: at 09:59

## 2022-01-01 RX ADMIN — Medication 34.5 MICROGRAM(S)/KG/MIN: at 17:11

## 2022-01-01 RX ADMIN — VASOPRESSIN 2.4 UNIT(S)/MIN: 20 INJECTION INTRAVENOUS at 11:59

## 2022-01-01 RX ADMIN — Medication 1: at 08:26

## 2022-01-01 RX ADMIN — Medication 1 DROP(S): at 14:30

## 2022-01-01 RX ADMIN — CHLORHEXIDINE GLUCONATE 1 APPLICATION(S): 213 SOLUTION TOPICAL at 05:02

## 2022-01-01 RX ADMIN — PANTOPRAZOLE SODIUM 40 MILLIGRAM(S): 20 TABLET, DELAYED RELEASE ORAL at 17:36

## 2022-01-01 RX ADMIN — Medication 250 MILLILITER(S): at 00:24

## 2022-01-01 RX ADMIN — DEXMEDETOMIDINE HYDROCHLORIDE IN 0.9% SODIUM CHLORIDE 3.68 MICROGRAM(S)/KG/HR: 4 INJECTION INTRAVENOUS at 17:12

## 2022-01-01 RX ADMIN — PANTOPRAZOLE SODIUM 40 MILLIGRAM(S): 20 TABLET, DELAYED RELEASE ORAL at 05:49

## 2022-01-01 RX ADMIN — Medication 11 MICROGRAM(S)/KG/MIN: at 06:58

## 2022-01-01 RX ADMIN — CHLORHEXIDINE GLUCONATE 15 MILLILITER(S): 213 SOLUTION TOPICAL at 05:02

## 2022-01-01 RX ADMIN — Medication 200 GRAM(S): at 02:40

## 2022-01-01 RX ADMIN — Medication 50 MILLIEQUIVALENT(S): at 19:24

## 2022-01-01 RX ADMIN — CEFEPIME 100 MILLIGRAM(S): 1 INJECTION, POWDER, FOR SOLUTION INTRAMUSCULAR; INTRAVENOUS at 06:22

## 2022-01-01 RX ADMIN — Medication 400 MILLIGRAM(S): at 21:30

## 2022-01-01 RX ADMIN — Medication 50 MILLIEQUIVALENT(S): at 22:30

## 2022-01-01 RX ADMIN — Medication 10 MILLIGRAM(S): at 14:17

## 2022-01-01 RX ADMIN — Medication 125 MILLILITER(S): at 04:37

## 2022-01-01 RX ADMIN — Medication 6.62 MICROGRAM(S)/KG/MIN: at 06:18

## 2022-01-01 RX ADMIN — Medication 500 MICROGRAM(S): at 01:03

## 2022-01-01 RX ADMIN — Medication 50 MILLIEQUIVALENT(S): at 04:11

## 2022-01-01 RX ADMIN — Medication 50 MILLIEQUIVALENT(S): at 04:10

## 2022-01-01 RX ADMIN — Medication 80 MILLIGRAM(S): at 20:17

## 2022-01-01 RX ADMIN — Medication 100 GRAM(S): at 03:15

## 2022-01-01 RX ADMIN — CEFEPIME 100 MILLIGRAM(S): 1 INJECTION, POWDER, FOR SOLUTION INTRAMUSCULAR; INTRAVENOUS at 17:44

## 2022-01-01 RX ADMIN — CHLORHEXIDINE GLUCONATE 15 MILLILITER(S): 213 SOLUTION TOPICAL at 17:36

## 2022-01-01 RX ADMIN — CHLORHEXIDINE GLUCONATE 15 MILLILITER(S): 213 SOLUTION TOPICAL at 06:09

## 2022-01-01 RX ADMIN — CHLORHEXIDINE GLUCONATE 15 MILLILITER(S): 213 SOLUTION TOPICAL at 06:17

## 2022-01-01 RX ADMIN — FENTANYL CITRATE 50 MICROGRAM(S): 50 INJECTION INTRAVENOUS at 20:10

## 2022-01-01 RX ADMIN — Medication 68.9 MICROGRAM(S)/KG/MIN: at 03:15

## 2022-01-01 RX ADMIN — CEFEPIME 100 MILLIGRAM(S): 1 INJECTION, POWDER, FOR SOLUTION INTRAMUSCULAR; INTRAVENOUS at 05:42

## 2022-01-01 RX ADMIN — VASOPRESSIN 2.4 UNIT(S)/MIN: 20 INJECTION INTRAVENOUS at 06:18

## 2022-01-01 RX ADMIN — VASOPRESSIN 2.4 UNIT(S)/MIN: 20 INJECTION INTRAVENOUS at 12:31

## 2022-01-01 RX ADMIN — Medication 1 DROP(S): at 14:18

## 2022-01-01 RX ADMIN — CEFEPIME 100 MILLIGRAM(S): 1 INJECTION, POWDER, FOR SOLUTION INTRAMUSCULAR; INTRAVENOUS at 05:48

## 2022-01-01 RX ADMIN — Medication 166.67 MILLIGRAM(S): at 22:15

## 2022-01-01 RX ADMIN — Medication 1000 MILLIGRAM(S): at 09:17

## 2022-01-01 RX ADMIN — CHLORHEXIDINE GLUCONATE 1 APPLICATION(S): 213 SOLUTION TOPICAL at 06:22

## 2022-01-01 RX ADMIN — Medication 200 GRAM(S): at 12:50

## 2022-01-01 RX ADMIN — Medication 40 MILLIGRAM(S): at 17:42

## 2022-01-01 RX ADMIN — PANTOPRAZOLE SODIUM 40 MILLIGRAM(S): 20 TABLET, DELAYED RELEASE ORAL at 06:17

## 2022-01-01 RX ADMIN — CHLORHEXIDINE GLUCONATE 15 MILLILITER(S): 213 SOLUTION TOPICAL at 05:32

## 2022-01-01 RX ADMIN — Medication 4.41 MICROGRAM(S)/KG/MIN: at 17:21

## 2022-01-01 RX ADMIN — Medication 50 MILLIEQUIVALENT(S): at 19:25

## 2022-01-01 RX ADMIN — HEPARIN SODIUM 0.6 UNIT(S)/HR: 5000 INJECTION INTRAVENOUS; SUBCUTANEOUS at 18:04

## 2022-01-01 RX ADMIN — Medication 11 MICROGRAM(S)/KG/MIN: at 06:54

## 2022-01-01 RX ADMIN — Medication 6.62 MICROGRAM(S)/KG/MIN: at 10:40

## 2022-01-01 RX ADMIN — Medication 6.89 MICROGRAM(S)/KG/MIN: at 19:18

## 2022-01-01 RX ADMIN — Medication 10 MILLIGRAM(S): at 14:20

## 2022-01-01 RX ADMIN — LACTULOSE 200 GRAM(S): 10 SOLUTION ORAL at 18:00

## 2022-01-01 RX ADMIN — Medication 50 MILLIEQUIVALENT(S): at 18:41

## 2022-01-01 RX ADMIN — VASOPRESSIN 0.6 UNIT(S)/MIN: 20 INJECTION INTRAVENOUS at 23:39

## 2022-01-01 RX ADMIN — PANTOPRAZOLE SODIUM 40 MILLIGRAM(S): 20 TABLET, DELAYED RELEASE ORAL at 06:09

## 2022-01-01 RX ADMIN — HYDROMORPHONE HYDROCHLORIDE 0.5 MILLIGRAM(S): 2 INJECTION INTRAMUSCULAR; INTRAVENOUS; SUBCUTANEOUS at 17:12

## 2022-01-01 RX ADMIN — Medication 10 MILLIGRAM(S): at 22:00

## 2022-01-01 RX ADMIN — Medication 16.5 MICROGRAM(S)/KG/MIN: at 01:30

## 2022-01-01 RX ADMIN — Medication 400 MILLIGRAM(S): at 06:54

## 2022-01-01 RX ADMIN — PANTOPRAZOLE SODIUM 40 MILLIGRAM(S): 20 TABLET, DELAYED RELEASE ORAL at 06:21

## 2022-01-01 RX ADMIN — HEPARIN SODIUM 0.6 UNIT(S)/HR: 5000 INJECTION INTRAVENOUS; SUBCUTANEOUS at 17:37

## 2022-01-01 RX ADMIN — CHLORHEXIDINE GLUCONATE 15 MILLILITER(S): 213 SOLUTION TOPICAL at 17:22

## 2022-01-01 RX ADMIN — BUMETANIDE 10 MG/HR: 0.25 INJECTION INTRAMUSCULAR; INTRAVENOUS at 01:58

## 2022-01-01 RX ADMIN — PANTOPRAZOLE SODIUM 40 MILLIGRAM(S): 20 TABLET, DELAYED RELEASE ORAL at 17:12

## 2022-01-01 RX ADMIN — Medication 200 GRAM(S): at 02:59

## 2022-01-01 RX ADMIN — Medication 250 MILLILITER(S): at 01:54

## 2022-01-01 RX ADMIN — Medication 1: at 11:24

## 2022-01-01 RX ADMIN — CHLORHEXIDINE GLUCONATE 15 MILLILITER(S): 213 SOLUTION TOPICAL at 06:21

## 2022-01-01 RX ADMIN — SODIUM CHLORIDE 500 MILLILITER(S): 9 INJECTION, SOLUTION INTRAVENOUS at 10:39

## 2022-01-01 RX ADMIN — PANTOPRAZOLE SODIUM 40 MILLIGRAM(S): 20 TABLET, DELAYED RELEASE ORAL at 17:24

## 2022-01-01 RX ADMIN — VASOPRESSIN 0.6 UNIT(S)/MIN: 20 INJECTION INTRAVENOUS at 05:51

## 2022-01-01 RX ADMIN — PANTOPRAZOLE SODIUM 40 MILLIGRAM(S): 20 TABLET, DELAYED RELEASE ORAL at 05:42

## 2022-01-01 RX ADMIN — DEXMEDETOMIDINE HYDROCHLORIDE IN 0.9% SODIUM CHLORIDE 3.68 MICROGRAM(S)/KG/HR: 4 INJECTION INTRAVENOUS at 10:41

## 2022-01-01 RX ADMIN — Medication 1: at 11:00

## 2022-01-01 RX ADMIN — VASOPRESSIN 2.4 UNIT(S)/MIN: 20 INJECTION INTRAVENOUS at 10:41

## 2022-01-01 RX ADMIN — Medication 1 DROP(S): at 22:37

## 2022-01-01 RX ADMIN — VASOPRESSIN 2.4 UNIT(S)/MIN: 20 INJECTION INTRAVENOUS at 07:49

## 2022-01-01 RX ADMIN — Medication 15 MILLILITER(S): at 12:00

## 2022-01-01 RX ADMIN — Medication 6.89 MICROGRAM(S)/KG/MIN: at 07:50

## 2022-01-01 RX ADMIN — VASOPRESSIN 6 UNIT(S)/MIN: 20 INJECTION INTRAVENOUS at 21:00

## 2022-01-01 RX ADMIN — MILRINONE LACTATE 5.51 MICROGRAM(S)/KG/MIN: 1 INJECTION, SOLUTION INTRAVENOUS at 21:45

## 2022-01-01 RX ADMIN — VASOPRESSIN 0.6 UNIT(S)/MIN: 20 INJECTION INTRAVENOUS at 19:34

## 2022-01-01 RX ADMIN — Medication 100 GRAM(S): at 06:51

## 2022-01-01 RX ADMIN — CHLORHEXIDINE GLUCONATE 15 MILLILITER(S): 213 SOLUTION TOPICAL at 17:30

## 2022-01-01 RX ADMIN — PANTOPRAZOLE SODIUM 40 MILLIGRAM(S): 20 TABLET, DELAYED RELEASE ORAL at 17:28

## 2022-01-01 RX ADMIN — Medication 6.89 MICROGRAM(S)/KG/MIN: at 11:59

## 2022-01-01 RX ADMIN — VASOPRESSIN 2.4 UNIT(S)/MIN: 20 INJECTION INTRAVENOUS at 06:37

## 2022-01-01 RX ADMIN — HYDROMORPHONE HYDROCHLORIDE 0.5 MILLIGRAM(S): 2 INJECTION INTRAMUSCULAR; INTRAVENOUS; SUBCUTANEOUS at 17:25

## 2022-01-01 RX ADMIN — VASOPRESSIN 2.4 UNIT(S)/MIN: 20 INJECTION INTRAVENOUS at 09:59

## 2022-01-01 RX ADMIN — Medication 81 MILLIGRAM(S): at 12:00

## 2022-01-01 RX ADMIN — Medication 34.5 MICROGRAM(S)/KG/MIN: at 22:40

## 2022-01-01 RX ADMIN — MILRINONE LACTATE 2.76 MICROGRAM(S)/KG/MIN: 1 INJECTION, SOLUTION INTRAVENOUS at 20:14

## 2022-01-01 RX ADMIN — Medication 6.62 MICROGRAM(S)/KG/MIN: at 07:49

## 2022-01-01 RX ADMIN — Medication 6.89 MICROGRAM(S)/KG/MIN: at 06:18

## 2022-01-01 RX ADMIN — CHLORHEXIDINE GLUCONATE 15 MILLILITER(S): 213 SOLUTION TOPICAL at 17:11

## 2022-01-01 RX ADMIN — CEFEPIME 100 MILLIGRAM(S): 1 INJECTION, POWDER, FOR SOLUTION INTRAMUSCULAR; INTRAVENOUS at 00:30

## 2022-01-01 RX ADMIN — Medication 125 MILLILITER(S): at 02:59

## 2022-01-01 RX ADMIN — ETOMIDATE 20 MILLIGRAM(S): 2 INJECTION INTRAVENOUS at 20:02

## 2022-01-01 RX ADMIN — CEFEPIME 100 MILLIGRAM(S): 1 INJECTION, POWDER, FOR SOLUTION INTRAMUSCULAR; INTRAVENOUS at 00:00

## 2022-01-01 RX ADMIN — CHLORHEXIDINE GLUCONATE 1 APPLICATION(S): 213 SOLUTION TOPICAL at 06:54

## 2022-01-01 RX ADMIN — Medication 250 MILLIGRAM(S): at 22:03

## 2022-01-01 RX ADMIN — PROPOFOL 13.2 MICROGRAM(S)/KG/MIN: 10 INJECTION, EMULSION INTRAVENOUS at 03:36

## 2022-01-01 RX ADMIN — SODIUM CHLORIDE 500 MILLILITER(S): 9 INJECTION, SOLUTION INTRAVENOUS at 01:23

## 2022-01-01 RX ADMIN — CEFEPIME 100 MILLIGRAM(S): 1 INJECTION, POWDER, FOR SOLUTION INTRAMUSCULAR; INTRAVENOUS at 17:21

## 2022-01-01 RX ADMIN — Medication 50 MILLIEQUIVALENT(S): at 19:30

## 2022-01-01 RX ADMIN — Medication 200 GRAM(S): at 20:30

## 2022-01-01 RX ADMIN — CHLORHEXIDINE GLUCONATE 15 MILLILITER(S): 213 SOLUTION TOPICAL at 17:49

## 2022-01-01 RX ADMIN — DEXMEDETOMIDINE HYDROCHLORIDE IN 0.9% SODIUM CHLORIDE 3.68 MICROGRAM(S)/KG/HR: 4 INJECTION INTRAVENOUS at 19:30

## 2022-01-01 RX ADMIN — Medication 200 GRAM(S): at 01:55

## 2022-01-01 RX ADMIN — Medication 81 MILLIGRAM(S): at 11:44

## 2022-01-01 RX ADMIN — Medication 11 MICROGRAM(S)/KG/MIN: at 20:15

## 2022-01-01 RX ADMIN — Medication 250 MILLILITER(S): at 03:18

## 2022-01-01 RX ADMIN — PANTOPRAZOLE SODIUM 40 MILLIGRAM(S): 20 TABLET, DELAYED RELEASE ORAL at 05:10

## 2022-01-01 RX ADMIN — MIDAZOLAM HYDROCHLORIDE 1.47 MG/KG/HR: 1 INJECTION, SOLUTION INTRAMUSCULAR; INTRAVENOUS at 22:50

## 2022-01-01 RX ADMIN — Medication 15 MILLILITER(S): at 10:00

## 2022-01-01 RX ADMIN — Medication 1 DROP(S): at 06:17

## 2022-01-01 RX ADMIN — CHLORHEXIDINE GLUCONATE 1 APPLICATION(S): 213 SOLUTION TOPICAL at 05:32

## 2022-01-01 RX ADMIN — PROPOFOL 4.41 MICROGRAM(S)/KG/MIN: 10 INJECTION, EMULSION INTRAVENOUS at 14:00

## 2022-01-01 RX ADMIN — Medication 81 MILLIGRAM(S): at 10:00

## 2022-01-01 RX ADMIN — Medication 1: at 17:22

## 2022-01-01 RX ADMIN — Medication 10 MILLIGRAM(S): at 05:10

## 2022-01-01 RX ADMIN — Medication 11 MICROGRAM(S)/KG/MIN: at 12:30

## 2022-01-01 RX ADMIN — PROPOFOL 13.2 MICROGRAM(S)/KG/MIN: 10 INJECTION, EMULSION INTRAVENOUS at 06:40

## 2022-01-01 RX ADMIN — AMIODARONE HYDROCHLORIDE 300 MILLIGRAM(S): 400 TABLET ORAL at 17:35

## 2022-01-01 RX ADMIN — Medication 81 MILLIGRAM(S): at 11:24

## 2022-01-01 RX ADMIN — CHLORHEXIDINE GLUCONATE 1 APPLICATION(S): 213 SOLUTION TOPICAL at 03:00

## 2022-01-01 RX ADMIN — SODIUM CHLORIDE 500 MILLILITER(S): 9 INJECTION, SOLUTION INTRAVENOUS at 04:59

## 2022-01-01 RX ADMIN — Medication 1 DROP(S): at 05:15

## 2022-01-01 RX ADMIN — PROPOFOL 13.2 MICROGRAM(S)/KG/MIN: 10 INJECTION, EMULSION INTRAVENOUS at 10:43

## 2022-01-01 RX ADMIN — PROPOFOL 4.41 MICROGRAM(S)/KG/MIN: 10 INJECTION, EMULSION INTRAVENOUS at 06:17

## 2022-01-01 RX ADMIN — CHLORHEXIDINE GLUCONATE 1 APPLICATION(S): 213 SOLUTION TOPICAL at 09:10

## 2022-01-01 RX ADMIN — Medication 15 MILLILITER(S): at 17:58

## 2022-01-01 RX ADMIN — Medication 1 DROP(S): at 06:22

## 2022-01-01 RX ADMIN — PANTOPRAZOLE SODIUM 40 MILLIGRAM(S): 20 TABLET, DELAYED RELEASE ORAL at 17:38

## 2022-01-01 RX ADMIN — DEXMEDETOMIDINE HYDROCHLORIDE IN 0.9% SODIUM CHLORIDE 3.68 MICROGRAM(S)/KG/HR: 4 INJECTION INTRAVENOUS at 05:32

## 2022-01-01 RX ADMIN — CEFEPIME 100 MILLIGRAM(S): 1 INJECTION, POWDER, FOR SOLUTION INTRAMUSCULAR; INTRAVENOUS at 22:30

## 2022-01-01 RX ADMIN — Medication 10 MILLIGRAM(S): at 06:22

## 2022-01-01 RX ADMIN — Medication 5.51 MICROGRAM(S)/KG/MIN: at 19:20

## 2022-01-01 RX ADMIN — Medication 1 DROP(S): at 22:00

## 2022-01-01 RX ADMIN — HEPARIN SODIUM 15.8 UNIT(S): 5000 INJECTION INTRAVENOUS; SUBCUTANEOUS at 23:00

## 2022-01-01 RX ADMIN — SODIUM CHLORIDE 1000 MILLILITER(S): 9 INJECTION INTRAMUSCULAR; INTRAVENOUS; SUBCUTANEOUS at 18:20

## 2022-01-01 RX ADMIN — Medication 200 GRAM(S): at 01:53

## 2022-01-01 RX ADMIN — BUMETANIDE 132 MILLIGRAM(S): 0.25 INJECTION INTRAMUSCULAR; INTRAVENOUS at 01:02

## 2022-01-01 RX ADMIN — Medication 6.62 MICROGRAM(S)/KG/MIN: at 03:00

## 2022-01-01 RX ADMIN — HEPARIN SODIUM 5000 UNIT(S): 5000 INJECTION INTRAVENOUS; SUBCUTANEOUS at 14:17

## 2022-01-01 RX ADMIN — Medication 100 MILLIGRAM(S): at 20:02

## 2022-01-01 RX ADMIN — CEFEPIME 100 MILLIGRAM(S): 1 INJECTION, POWDER, FOR SOLUTION INTRAMUSCULAR; INTRAVENOUS at 17:28

## 2022-01-01 RX ADMIN — Medication 50 MILLIEQUIVALENT(S): at 22:35

## 2022-01-01 RX ADMIN — Medication 50 MILLILITER(S): at 09:11

## 2022-01-01 RX ADMIN — Medication 30 MILLILITER(S): at 07:00

## 2022-01-01 RX ADMIN — Medication 100 MILLIEQUIVALENT(S): at 05:42

## 2022-01-01 RX ADMIN — HEPARIN SODIUM 15.8 UNIT(S): 5000 INJECTION INTRAVENOUS; SUBCUTANEOUS at 05:51

## 2022-01-01 RX ADMIN — Medication 10 MILLIGRAM(S): at 22:37

## 2022-01-01 RX ADMIN — Medication 250 MICROGRAM(S): at 06:54

## 2022-01-01 RX ADMIN — PROPOFOL 4.41 MICROGRAM(S)/KG/MIN: 10 INJECTION, EMULSION INTRAVENOUS at 21:28

## 2022-01-01 RX ADMIN — Medication 250 MILLIGRAM(S): at 00:20

## 2022-05-11 NOTE — ED PROVIDER NOTE - MDM ORDERS SUBMITTED SELECTION
FUTURE VISIT INFORMATION      FUTURE VISIT INFORMATION:    Date: 5/24/18    Time: 2:00    Location: [facility]  REFERRAL INFORMATION:    Referring provider:  Self    Referring providers clinic:      Reason for visit/diagnosis : Left thumb pain      RECORDS STATUS      No Outside Records   Labs/EKG/Imaging Studies/Medications

## 2022-05-11 NOTE — CONSULT NOTE ADULT - ASSESSMENT
70 yo M hx HTN HLD DMII CAD CHF pw profound cardiogenic shock. Maxed on levo. POCUS showing VTI 3. CICU consulted for shock team.

## 2022-05-11 NOTE — H&P ADULT - NSHPPHYSICALEXAM_GEN_ALL_CORE
PHYSICAL EXAM:  Constitutional: Patient laying in bed, NAD, Sedated and intubaed  Head: Atraumatic, normocephalic  Eyes: No scleral icterus. PERRLA. EOMI  ENMT: Moist mucous membrane. Uvula midline, Intubated.   Neck: Supple, No JVD  Respiratory: CTA B/L. Mechanical breath sounds  Cardiovascular: S1/S2. No murmurs, rubs, or gallops. Impella in place, on P8.   Gastrointestinal:  BSx4, soft, nontender. + distended  Extremities: Cold extremities, pulses dopplerable, pt sedated  Vascular: Dopplerable pulses   Neurological: Sedated  Skin: No rashes  on exposed skin

## 2022-05-11 NOTE — ED PROCEDURE NOTE - PROCEDURE ADDITIONAL DETAILS
Red Epstein D.O., PGY3 (Resident)  A line left fem, + tracing  NOT STERILE (semi sterile technique)
Red Epstein D.O., PGY3 (Resident)  Patient was preoxygenated. Induction and paralytic agents given (reference orders). Size 7.5 cuffed endotracheal tube (ETT) was advanced past the vocal cords into the trachea while under direct visualization. ETT was secured at 21cm at the lip and was secured to the patient. Postive end-tidal capnography was obtained. Endotracheal mist was observed. Bilateral breath sounds were heard. Tube placement was confirmed with CXR.

## 2022-05-11 NOTE — ED ADULT NURSE NOTE - OBJECTIVE STATEMENT
Patient is a 72 y/o male with PMH of CHF presenting to the ED via waiting room with c/o difficulty breathing x2 days. Patient states he was at [facility] 2 years ago for the same complaint and had a defibrillator placed. Patient arrives to the ED with labored breathing and tachypneic, 98% O2 sat on RA, placed on NC for comfort without relief, changed to nonrebreather, respiratory contacted, patient placed on bipap @1724. Unable to obtain accurate O2 sat. Patient continuing labored breathing on bipap. Tachypneic to RR in 50s. Patient noted to be in vtach, EKG performed, pads placed on patient. Amio 300mg IV push administered @1735, patient converted to NSR. Patient became more alert, still tachypneic, unable to obtain accurate O2 sat. Patient remaining in NSR, RR still in 40s-50s on bipap. Patient became increasingly lethargic. Patient's BP started to drop, patient started on levo @1801. Titrating levo. BPs began to rise and dropped again.  bolus administered. MICU to bedside @1830. Preparing for possible intubation. Patient abdomen and extremities becoming cold, blue, mottled, unable to obtain O2 sat. 1 amp bicarb administered. Milrinone drip started .25 @1853. A-line placed by MD Hogan. Milrinone stopped, dobutamine started 7.5 @1909. MICU remains at bedside. Family at bedside. Patient is a 72 y/o male with PMH of CHF presenting to the ED via waiting room with c/o difficulty breathing x2 days. Patient states he was at [facility] 2 years ago for the same complaint and had a defibrillator placed. Patient arrives to the ED with labored breathing and tachypneic, 98% O2 sat on RA, placed on NC for comfort without relief, changed to nonrebreather, respiratory contacted, patient placed on bipap @1724. Unable to obtain accurate O2 sat. Patient continuing labored breathing on bipap. Tachypneic to RR in 50s. Patient noted to be in vtach, EKG performed, pads placed on patient. Amio 300mg IV push administered @1735, patient converted to NSR. Patient became more alert, still tachypneic, unable to obtain accurate O2 sat. Patient remaining in NSR, RR still in 40s-50s on bipap. Patient became increasingly lethargic. Patient's BP started to drop, patient started on levo @1801. Titrating levo. BPs began to rise and dropped again.  bolus administered. MICU to bedside @1830. Preparing for possible intubation. Patient abdomen and extremities becoming cold, blue, mottled, unable to obtain O2 sat. 1 amp bicarb administered. Milrinone drip started @1853. A-line placed. Milrinone stopped, dobutamine started @1909. MICU remains at bedside. 2 amps bicarb administered @1925. Vasopressin started @1937. Family at bedside. Intubation prepartion initiated, 20 etom 100 succs administered by MD @20:02. Patient intubated, bilateral breath sounds, pos color change, pos condensation, 7.5 Tajik, 21 lipline. Patient to go to CICU and cath lab.

## 2022-05-11 NOTE — ED PROVIDER NOTE - OBJECTIVE STATEMENT
64 yr old male with PMH of CAD (cath 1 year ago - EF 30%, severe D1, OM, RPDA, ,RV marginal disease in small caliber vessels) ),  HTN, HLD, former smoker and DM (A1C on admission 6.8) p/w sob the past few days worse today, mild cough, no fever or chest pain, rales noted b/l, tachypneic, placed on bipap.

## 2022-05-11 NOTE — CONSULT NOTE ADULT - SUBJECTIVE AND OBJECTIVE BOX
Called to evaluate the patient due to profound low output state/cardiogenic shock  When I arrived the patient was on max dose Levophed with a systolic blood pressure in the 70s  He was also on Bipap with RR 40s, TV 200s and not mentating well  Lactate was reportedly 11 and pH was 7  The ED attending was placing a femoral central line to start Milrinone  The MICU attending was present and did a bedside TTE   TTE showed an LVEF 5-10% and dilated RV with severely reduced function  I asked my fellow to activate the shock team while I stayed bedside to help treat the patient  Milrinone had been started with significant improvement in blood pressure  I asked the team to start Dobutamine and Vasopressin and push 2 amps of bicarb  I also asked the team to place an arterial line which was done femorally  I spoke to the patient's daughter, Dr. Josefina Love, who is a palliative care attending  After discussions, the patient's daughter did not want the patient coded if his heart stopped but asked that everything else be done  She was ok with trials of intubation and mechanical support  I discussed the case with Dr. Rizzo of the heart team and per his recommendations Lasix 80 mg IV was ordered  It was also determined that the patient would go to the cath lab for an Impella CP  This was all explained to the patient's daughter who agreed to this plan  After the addition of Dobutamine and Vasopressin and giving bicarb his systolic BP improved to 110s-120s  I asked the ED team to intubate the patient and wean Levophed as able  The Interventional Cardiology attending was at another emergency at [facility] so the patient will come to the CICU before the cath lab  The ED and Shock teams and the patient's daughter were all aware of this plan

## 2022-05-11 NOTE — H&P ADULT - NSICDXFAMILYHX_GEN_ALL_CORE_FT
FAMILY HISTORY:  Family history of diabetes mellitus, brother  complications of DM age 50 yr  Family history of heart attack, mother  57 yr  Family history of stroke, father  59 yr

## 2022-05-11 NOTE — H&P ADULT - ASSESSMENT
Assessment and Plan    71M hx CAD, CHF (EF30%), DMII, HLD, p/w SOB and malaise found to be in cardiogenic shock requiring intubation and impella placement.     Plan:  Neuro  - sedated with versed  - off sedation, pt had no neuro deficits    Cardiac  #Cardiogenic Shock  - Lactate 13  - Pt stated in ED on milrinone, dobutamine, vaso and levo   - Impella placed in cath lab on P8   - continue to monitor perfusion labs  - wean vasopressors as tolerated  - Mix upon arrival to CICU 81.9, d/c'd milrinone  - continue to monitor hemolysis labs   - EF acutely reduced to approx 5%      Assessment and Plan    71M hx CAD, CHF (EF30%), DMII, HLD, p/w SOB and malaise found to be in cardiogenic shock requiring intubation and impella placement.     Plan:  Neuro  - sedated with versed  - off sedation, pt had no neuro deficits    Respiratory  - Intubated for airway protection and tachypnea in ED   - trend ABG for vent settings       Cardiac  #Cardiogenic Shock  - Lactate 13  - Pt stated in ED on milrinone, dobutamine, vaso and levo   - Impella placed in cath lab on P8   - continue to monitor perfusion labs  - wean vasopressors as tolerated  - Mix upon arrival to CICU 81.9, d/c'd milrinone  - continue to monitor hemolysis labs   - EF acutely reduced to approx 5%     #hx HLD  - holding statin in setting of elevated liver enzymes     GI  - No active issues  - Abdomen mildly distended on exam, abdominal xray with no acute pathology seen, f/u official read  - NPO  - OG tube on intermittent low wall suction    Renal  #ALISSA  - Cr 1.8  - urine output goal -250/300 per hour   - s/p Lasix 80 and 40 in ED  - Bumex started in CICU  - monitor SCr and urine output closely     #Respiratory acidosis   - pt given multiple amps of bicarb with improvement   - now resolved with intubation     Endo  #Hx DM2   - holding home antidiabetic agents  - ISS   - monitor blood glucose   - f/u A1C    -f/u TSH       Heme/Onc  # Thrombocytopenia  - Plt on admission 81, rpt 33  - continue to monitor CBC closely  - Will consider holding purge heparin if platelets continue to downtrend   - continue to trend H/H   - hgb on admission 18.5, rpt 13.6    - of note, per daughter, pt has been evaluated by hematologist and found no abnormalities in testing      ID  - pt wbc within normal limits  - afebrile   - started on empiric abx: vanco and cefepime  - continue to trend wbc and fever curve    Goals of care  - daughter is palliative care physician  - family signed MOLST form and pt is now DNR   - family stated they will evaluate how patient does overnight and re-evaluate tomorrow if they would like to reconsider       Pt was discussed in detail with attending, Dr Miguel Friend and fellow Jerome Soares.       Patient requires continuous monitoring with bedside rhythm monitoring, pulse ox monitoring, and intermittent blood gas analysis. Care plan discussed with ICU care team. Patient remained critical and at risk for life threatening decompensation.  Patient seen, examined and plan discussed with CCU team during rounds.     I have personally provided 35 minutes of critical care time excluding time spent on separate procedures, in addition to initial critical care time provided by the CICU Attending, Dr. Friend

## 2022-05-11 NOTE — CONSULT NOTE ADULT - ATTENDING COMMENTS
70 yo man with cardiac dieasea p/w dispnea? recent viral pna?  VT vs fib with aberency in ed.  IN profound shock.  Cold on exam.  Pocus With some Blines.  But severe LV dysfunction, LVOT of 1.95 with VIT of 3.6 and HR of 95 on MAx levophend.   This works out to cardiac out put of 1L prfound cardiogenic shock  Appreciate cardiology eval for mechanichal support.   Started Milrinone and vaso.   Need to intubate once Bp is somwhat improved.    Possible some ILD overlying given poor mechenics on bipap? though not driving factor Would get covid/RVP and if ever stable CT chest.    Patient is very critically ill and will need admission to ICU.    Will follow up Cardiology/Shock team.   Discussion with family about code status.  Poor prognosis

## 2022-05-11 NOTE — H&P ADULT - NSICDXPASTMEDICALHX_GEN_ALL_CORE_FT
PAST MEDICAL HISTORY:  CAD (coronary artery disease) reports angiogram - 1 year ago - pt reports non obstructive  at [facility]    CHF (congestive heart failure) (EF 30%)    Diabetes A1C 6.8  on admission    Former smoker     HLD (hyperlipidemia)     Hypertension

## 2022-05-11 NOTE — ED PROCEDURE NOTE - CPROC ED ARTER LINE DETAIL1
Positive blood return was obtained via the catheter./Connected to a pressurized flush line./Line was sutured in place./Hemostasis was achieved with direct pressure, and a dry sterile dressing applied./Ultrasound guidance was used.

## 2022-05-11 NOTE — ED PROCEDURE NOTE - CPROC ED INDICATIONS1
airway protection
emergency venous access
arterial puncture to obtain ABG's/critical patient/monitoring purposes

## 2022-05-11 NOTE — ED ADULT NURSE NOTE - NSIMPLEMENTINTERV_GEN_ALL_ED
Implemented All Fall Risk Interventions:  [place] to call system. Call bell, personal items and telephone within reach. Instruct patient to call for assistance. Room bathroom lighting operational. Non-slip footwear when patient is off stretcher. Physically safe environment: no spills, clutter or unnecessary equipment. Stretcher in lowest position, wheels locked, appropriate side rails in place. Provide visual cue, wrist band, yellow gown, etc. Monitor gait and stability. Monitor for mental status changes and reorient to person, place, and time. Review medications for side effects contributing to fall risk. Reinforce activity limits and safety measures with patient and family.

## 2022-05-11 NOTE — CONSULT NOTE ADULT - SUBJECTIVE AND OBJECTIVE BOX
70 yo M hx HTN HLD DMII CAD CHF pw malaise and confusion. History obtained from wife.     PMHx:   Hypertension    Diabetes    Former smoker    MI (myocardial infarction)    HLD (hyperlipidemia)    CAD (coronary artery disease)    CHF (congestive heart failure)        PSHx:   H/O fracture of wrist        Home Meds:    Current Meds:   DOBUTamine Infusion 5 MICROgram(s)/kG/Min IV Continuous <Continuous>  furosemide   Injectable 40 milliGRAM(s) IV Push Once  norepinephrine Infusion 0.05 MICROgram(s)/kG/Min IV Continuous <Continuous>  vasopressin Infusion 0.01 Unit(s)/Min IV Continuous <Continuous>      Allergies:  No Known Allergies      FAMILY HISTORY:  Family history of stroke  father  59 yr    Family history of diabetes mellitus  brother  complications of DM age 50 yr    Family history of heart attack  mother  57 yr        Social History:   . Retired . Quit smoking.     REVIEW OF SYSTEMS:   Unable to obtain review of systems due to patient condition.     Physical Exam:  T(F): --  HR: 64 () (64 - 94)  BP: --  RR: 36 ()  SpO2: 98% ()  Gen: Tachypneic on BIPAP .  HEENT: NCAT. PERRLA b/l.  Neck: +JVP elev.  CV: Normal S1, S2. RRR. No MRG.  Chest: Distant heart sounds   Abd: +BSx4. Soft. NTND.  Ext: No LE edema. Cold extremities   Skin: No cyanosis.       Labs: Personally reviewed        132<L>  |  95<L>  |  33<H>  ----------------------------<  223<H>  x    |  13<L>  |  1.80<H>    Ca    9.5      11 May 2022 18:15    TPro  7.7  /  Alb  4.7  /  TBili  2.1<H>  /  DBili  x   /  AST  304<H>  /  ALT  246<H>  /  AlkPhos  140<H>

## 2022-05-11 NOTE — H&P ADULT - NSHPLABSRESULTS_GEN_ALL_CORE
13.6   7.36  )-----------( 33       ( 11 May 2022 22:39 )             43.5       05-11    132<L>  |  95<L>  |  33<H>  ----------------------------<  223<H>  7.9<HH>   |  13<L>  |  1.80<H>    Ca    9.5      11 May 2022 18:15    TPro  7.7  /  Alb  4.7  /  TBili  2.1<H>  /  DBili  x   /  AST  304<H>  /  ALT  246<H>  /  AlkPhos  140<H>  05-11              ABG - ( 11 May 2022 23:39 )  pH, Arterial: 7.47  pH, Blood: x     /  pCO2: 35    /  pO2: 149   / HCO3: 26    / Base Excess: 2.2   /  SaO2: 99.0                    PT/INR - ( 11 May 2022 22:39 )   PT: 29.2 sec;   INR: 2.49 ratio         PTT - ( 11 May 2022 22:39 )  PTT:>200.0 sec    Lactate Trend  05-11 @ 22:39 Lactate:13.6

## 2022-05-11 NOTE — ED PROCEDURE NOTE - CPROC ED INFUS LINE DETAIL1
The location was identified, and the area was draped and prepped./The guidewire was recovered./All lumen(s) aspirated and flushed without difficulty.

## 2022-05-11 NOTE — PATIENT PROFILE ADULT - VISION (WITH CORRECTIVE LENSES IF THE PATIENT USUALLY WEARS THEM):
JOHN due to medical condition/Normal vision: sees adequately in most situations; can see medication labels, newsprint

## 2022-05-11 NOTE — CHART NOTE - NSCHARTNOTEFT_GEN_A_CORE
Emergency Room :  LMSW received a referral for assistance with support.  Patient is a 71 year old male presented to  the ED for difficulty breathing. Wife is at bedside and tearful.  LMW introduced herself to wife and she verbalized understanding the role of the .  Daughter also arrived at the ED.  Support was provided.  Medical team provided an update.  LSMW remains available if necessary.

## 2022-05-11 NOTE — ED PROVIDER NOTE - PROGRESS NOTE DETAILS
was called for VT on monitor 120s, eyes closed moaning but easily arousable, bp 120s given amio with change in rhythm on monitor, second ekg with afib with aberrancy and more responsive, amio given, cards called, on cr monitor.  seen by cards think its his afib to follow, awaiting work up.   joe lee patient with continual worsening wob on bipap, seen by cards multliple times and micu with worsening hypotension, placed on levophed, vbg lactate 10, shock peripherally clamped down, second pressor started, micu called bedside echo with minimal squeeze likely poor ef, thought to be in cardiogenic shock, ccu to take patient for iabp vs ecmo. family at bedside and aware.   joe lee

## 2022-05-11 NOTE — PATIENT PROFILE ADULT - FALL HARM RISK - HARM RISK INTERVENTIONS

## 2022-05-11 NOTE — H&P ADULT - HISTORY OF PRESENT ILLNESS
History obtained from wife, daughter and charts.     Pt is a 71M with PMHx DMII, CHF (30%), AICD 1yr ago, HLD. He is a former smoker (quit approx 30 years ago). Pt received his second COVID 19 booster shot this week.     Per daughter, pt began having some new lower extremity edema last week and later developed a night time cough. His symptoms improved after a few days until today when his cough worsened and he became extremely tachypneic and short of breath. Daughter became very concerned and brought him to ED.     In the ED, pt was tachypneic, with systolic BP in the 70s maxed on levo. He was also on Bipap with RR 40s, TV 200s and not mentating well. Lactate was reportedly 11 and pH was 7. In the ED, he was started on milrinone and dobutamine and then shock team was activated. The patient was taken to cath lab and an Impella was placed.     Upon arrival to CICU, pt was on Dobutamine @10, Milrinone @.125, Vaso 0.08, Levo @ 1.0, intubated on PEEP 8 and fio2 100%

## 2022-05-12 NOTE — CONSULT NOTE ADULT - ASSESSMENT
71 M with PMHx DMII, CHF (LVEF 30%), AICD 1yr ago, HLD a/w SOB and tachypneic.   Started milrinone and dobutamine and then shock team was activated. The patient was taken to cath lab and an Impella was placed. Thereafter he was transferred to CICU, and he was on Dobutamine @10, Milrinone @.125, Vaso 0.08, Levo @ 1.0, intubated on PEEP 8 and fio2 100%.     Hematology consulted for thrombocytopenia 34 (on admission plt 80s) and r/o DIC in view of increased Ddimer [id number] and slightly decreased Fibrinogen 327    To be noted, daughter and his wife at the bedside reported that pt has chronic increased Hb and followed outside hematologist and his PCP who recommended intermittent phlebotomy. Daughter mentioned that recently "mutations panel sent out by his doctor". Denied previous history of bleeding or blood clot formation. Denied use of blood thinners.       #Thrombocytopenia   #increased D dimer and decreased Fibrinogen   #chronic history of increased Hb R/o polycytemia vera vs MDS/MPN   -peripheral blood smear reviewed by hematology team: no schistocytes and blasts seen.   -Thrombocytopenia likely due to Impella use; no e/o TMA   -agree with 1 u Platelet transfusion before procedure; 10u cryoprecipitate transfusion in view of slightly decreased Fibrinogen   -monitor Coagulation panel and CBC/platelet   -LDH/uric acid, haptoglobin,   -send HIT panel including Heprin PF4 antibody and serotonin release assay  -may consider send JAK2, MPL, and GALE mutation   -may consider bone marrow biopsy as outpt with pt's hematologist; alternatively will [facility] to [facility] if pt and his family would like to f/u with [facility] hematology team.      Discussed with attending Dr. Garnett and fellow Dr. Norris Mukherjee PGY4 [phone]

## 2022-05-12 NOTE — PROGRESS NOTE ADULT - SUBJECTIVE AND OBJECTIVE BOX
ALAN DE LA ROSA  MRN-[id number]  Patient is a 71y old  Male who presents with a chief complaint of Cardiogenic Shock (12 May 2022 14:02)    HPI:   History obtained from wife, daughter and charts.     Pt is a 71M with PMHx DMII, CHF (30%), AICD 1yr ago, HLD. He is a former smoker (quit approx 30 years ago). Pt received his second COVID 19 booster shot this week.     Per daughter, pt began having some new lower extremity edema last week and later developed a night time cough. His symptoms improved after a few days until today when his cough worsened and he became extremely tachypneic and short of breath. Daughter became very concerned and brought him to ED.     In the ED, pt was tachypneic, with systolic BP in the 70s maxed on levo. He was also on Bipap with RR 40s, TV 200s and not mentating well. Lactate was reportedly 11 and pH was 7. In the ED, he was started on milrinone and dobutamine and then shock team was activated. The patient was taken to cath lab and an Impella was placed.     Upon arrival to CICU, pt was on Dobutamine @10, Milrinone @.125, Vaso 0.08, Levo @ 1.0, intubated on PEEP 8 and fio2 100%   (11 May 2022 22:58)      24 hr events:    REVIEW OF SYSTEMS:    CONSTITUTIONAL: No weakness, fevers or chills  EYES/ENT: No visual changes;  No vertigo or throat pain   NECK: No pain or stiffness  RESPIRATORY: No cough, wheezing, hemoptysis; No shortness of breath  CARDIOVASCULAR: No chest pain or palpitations  GASTROINTESTINAL: No abdominal or epigastric pain. No nausea, vomiting, or hematemesis; No diarrhea or constipation. No melena or hematochezia.  GENITOURINARY: No dysuria, frequency or hematuria  NEUROLOGICAL: No numbness or weakness  SKIN: No itching, rashes      Physical Exam:  Vital Signs Last 24 Hrs  T(C): 38.4 (12 May 2022 06:45), Max: 38.4 (12 May 2022 05:30)  T(F): 101.1 (12 May 2022 06:45), Max: 101.1 (12 May 2022 05:30)  HR: 80 (12 May 2022 21:30) (64 - 141)  BP: 98/71 (12 May 2022 07:45) (81/53 - 111/55)  BP(mean): 80 (12 May 2022 07:45) (61 - 80)  RR: 16 (12 May 2022 21:30) (11 - 28)  SpO2: 93% (12 May 2022 21:30) (85% - 100%)      ============================I/O===========================   I&O's Detail    11 May 2022 07:01  -  12 May 2022 07:00  --------------------------------------------------------  IN:    Bumetanide: 5 mL    DOBUTamine: 132 mL    DOBUTamine: 11 mL    DOBUTamine: 22 mL    IV PiggyBack: 50 mL    IV PiggyBack: 131.8 mL    IV PiggyBack: 900 mL    Midazolam: 51.8 mL    Norepinephrine: 121.1 mL    Norepinephrine: 275.6 mL    Propofol: 8.8 mL    Vasopressin: 36.6 mL    Vasopressin: 6 mL  Total IN: 1751.7 mL    OUT:    Indwelling Catheter - Urethral (mL): 50 mL    Voided (mL): 1130 mL  Total OUT: 1180 mL    Total NET: 571.7 mL      12 May 2022 07:01  -  12 May 2022 21:39  --------------------------------------------------------  IN:    Cryoprecipitate: 150 mL    DOBUTamine: 121 mL    Enteral Tube Flush: 60 mL    IV PiggyBack: 550 mL    IV PiggyBack: 127.8 mL    Plasma: 300 mL    Platelets - Single Donor: 225 mL    Propofol: 79.2 mL    Vasopressin: 54.6 mL  Total IN: 1667.6 mL    OUT:    Indwelling Catheter - Urethral (mL): 415 mL    Rectal Tube (mL): 1000 mL  Total OUT: 1415 mL    Total NET: 252.6 mL        ============================ LABS =========================                        11.2   11.21 )-----------( 32       ( 12 May 2022 14:38 )             35.3     05-12    147<H>  |  100  |  48<H>  ----------------------------<  183<H>  4.0   |  21<L>  |  3.31<H>    Ca    8.7      12 May 2022 14:38  Phos  5.7     0512  Mg     2.0         TPro  4.2<L>  /  Alb  2.5<L>  /  TBili  5.1<H>  /  DBili  x   /  AST  9096<H>  /  ALT  6743<H>  /  AlkPhos  91  05-12    LIVER FUNCTIONS - ( 12 May 2022 14:38 )  Alb: 2.5 g/dL / Pro: 4.2 g/dL / ALK PHOS: 91 U/L / ALT: 6743 U/L / AST: 9096 U/L / GGT: x           PT/INR - ( 12 May 2022 14:38 )   PT: 33.9 sec;   INR: 2.92 ratio         PTT - ( 12 May 2022 14:38 )  PTT:75.4 sec  ABG - ( 12 May 2022 18:40 )  pH, Arterial: 7.43  pH, Blood: x     /  pCO2: 36    /  pO2: 96    / HCO3: 24    / Base Excess: -0.2  /  SaO2: 98.1              Urinalysis Basic - ( 11 May 2022 22:13 )    Color: Yellow / Appearance: Clear / S.021 / pH: x  Gluc: x / Ketone: Trace  / Bili: Negative / Urobili: <2 mg/dL   Blood: x / Protein: 30 mg/dL / Nitrite: Negative   Leuk Esterase: Negative / RBC: 141 /HPF / WBC 2 /HPF   Sq Epi: x / Non Sq Epi: 0 /HPF / Bacteria: Negative      ======================Micro/Rad/Cardio=================  Culture: Reviewed   CXR: Reviewed  Echo:Reviewed  ======================================================  PAST MEDICAL & SURGICAL HISTORY:  Hypertension      Diabetes  A1C 6.8  on admission      Former smoker      HLD (hyperlipidemia)      CAD (coronary artery disease)  reports angiogram - 1 year ago - pt reports non obstructive  at [facility]      CHF (congestive heart failure)  (EF 30%)      H/O fracture of wrist  and left ankle (ORIF ankle) following fall        ====================ASSESSMENT ==============      Plan:  ====================== NEUROLOGY=====================  propofol Infusion 30 MICROgram(s)/kG/Min (13.2 mL/Hr) IV Continuous <Continuous>    ==================== RESPIRATORY======================  Mechanical Ventilation:  Mode: AC/ CMV (Assist Control/ Continuous Mandatory Ventilation)  RR (machine): 16  TV (machine): 450  FiO2: 30  PEEP: 5  ITime: 0.79  MAP: 10  PIP: 25      ====================CARDIOVASCULAR==================  DOBUTamine Infusion 5 MICROgram(s)/kG/Min (11 mL/Hr) IV Continuous <Continuous>    ===================HEMATOLOGIC/ONC ===================  aspirin  chewable 81 milliGRAM(s) Enteral Tube daily    ===================== RENAL =========================  Continue monitoring urine output    ==================== GASTROINTESTINAL===================  pantoprazole  Injectable 40 milliGRAM(s) IV Push two times a day    =======================    ENDOCRINE  =====================  dextrose 50% Injectable 25 Gram(s) IV Push once  dextrose 50% Injectable 12.5 Gram(s) IV Push once  dextrose 50% Injectable 25 Gram(s) IV Push once  dextrose Oral Gel 15 Gram(s) Oral once  glucagon  Injectable 1 milliGRAM(s) IntraMuscular once  insulin lispro (ADMELOG) corrective regimen sliding scale   SubCutaneous three times a day before meals  vasopressin Infusion 0.04 Unit(s)/Min (2.4 mL/Hr) IV Continuous <Continuous>    ========================INFECTIOUS DISEASE================  cefepime   IVPB 2000 milliGRAM(s) IV Intermittent every 12 hours  vancomycin  IVPB      vancomycin  IVPB 1000 milliGRAM(s) IV Intermittent every 24 hours         ALAN DE LA ROSA  MRN-[id number]  Patient is a 71y old  Male who presents with a chief complaint of Cardiogenic Shock (12 May 2022 14:02)    HPI:   History obtained from wife, daughter and charts.     Pt is a 71M with PMHx DMII, CHF (30%), AICD 1yr ago, HLD. He is a former smoker (quit approx 30 years ago). Pt received his second COVID 19 booster shot this week.     Per daughter, pt began having some new lower extremity edema last week and later developed a night time cough. His symptoms improved after a few days until today when his cough worsened and he became extremely tachypneic and short of breath. Daughter became very concerned and brought him to ED.     In the ED, pt was tachypneic, with systolic BP in the 70s maxed on levo. He was also on Bipap with RR 40s, TV 200s and not mentating well. Lactate was reportedly 11 and pH was 7. In the ED, he was started on milrinone and dobutamine and then shock team was activated. The patient was taken to cath lab and an Impella was placed.     Upon arrival to CICU, pt was on Dobutamine @10, Milrinone @.125, Vaso 0.08, Levo @ 1.0, intubated on PEEP 8 and fio2 100%   (11 May 2022 22:58)      24 hr events: Impella d/c'd today, pt tolerated well.     REVIEW OF SYSTEMS: unable to assess         Physical Exam:  Vital Signs Last 24 Hrs  T(C): 38.4 (12 May 2022 06:45), Max: 38.4 (12 May 2022 05:30)  T(F): 101.1 (12 May 2022 06:45), Max: 101.1 (12 May 2022 05:30)  HR: 80 (12 May 2022 21:30) (64 - 141)  BP: 98/71 (12 May 2022 07:45) (81/53 - 111/55)  BP(mean): 80 (12 May 2022 07:45) (61 - 80)  RR: 16 (12 May 2022 21:30) (11 - 28)  SpO2: 93% (12 May 2022 21:30) (85% - 100%)      ============================I/O===========================   I&O's Detail    11 May 2022 07:01  -  12 May 2022 07:00  --------------------------------------------------------  IN:    Bumetanide: 5 mL    DOBUTamine: 132 mL    DOBUTamine: 11 mL    DOBUTamine: 22 mL    IV PiggyBack: 50 mL    IV PiggyBack: 131.8 mL    IV PiggyBack: 900 mL    Midazolam: 51.8 mL    Norepinephrine: 121.1 mL    Norepinephrine: 275.6 mL    Propofol: 8.8 mL    Vasopressin: 36.6 mL    Vasopressin: 6 mL  Total IN: 1751.7 mL    OUT:    Indwelling Catheter - Urethral (mL): 50 mL    Voided (mL): 1130 mL  Total OUT: 1180 mL    Total NET: 571.7 mL      12 May 2022 07:01  -  12 May 2022 21:39  --------------------------------------------------------  IN:    Cryoprecipitate: 150 mL    DOBUTamine: 121 mL    Enteral Tube Flush: 60 mL    IV PiggyBack: 550 mL    IV PiggyBack: 127.8 mL    Plasma: 300 mL    Platelets - Single Donor: 225 mL    Propofol: 79.2 mL    Vasopressin: 54.6 mL  Total IN: 1667.6 mL    OUT:    Indwelling Catheter - Urethral (mL): 415 mL    Rectal Tube (mL): 1000 mL  Total OUT: 1415 mL    Total NET: 252.6 mL        ============================ LABS =========================                        11.2   11.21 )-----------( 32       ( 12 May 2022 14:38 )             35.3     05-12    147<H>  |  100  |  48<H>  ----------------------------<  183<H>  4.0   |  21<L>  |  3.31<H>    Ca    8.7      12 May 2022 14:38  Phos  5.7     05-12  Mg     2.0     -12    TPro  4.2<L>  /  Alb  2.5<L>  /  TBili  5.1<H>  /  DBili  x   /  AST  9096<H>  /  ALT  6743<H>  /  AlkPhos  91  05-12    LIVER FUNCTIONS - ( 12 May 2022 14:38 )  Alb: 2.5 g/dL / Pro: 4.2 g/dL / ALK PHOS: 91 U/L / ALT: 6743 U/L / AST: 9096 U/L / GGT: x           PT/INR - ( 12 May 2022 14:38 )   PT: 33.9 sec;   INR: 2.92 ratio         PTT - ( 12 May 2022 14:38 )  PTT:75.4 sec  ABG - ( 12 May 2022 18:40 )  pH, Arterial: 7.43  pH, Blood: x     /  pCO2: 36    /  pO2: 96    / HCO3: 24    / Base Excess: -0.2  /  SaO2: 98.1              Urinalysis Basic - ( 11 May 2022 22:13 )    Color: Yellow / Appearance: Clear / S.021 / pH: x  Gluc: x / Ketone: Trace  / Bili: Negative / Urobili: <2 mg/dL   Blood: x / Protein: 30 mg/dL / Nitrite: Negative   Leuk Esterase: Negative / RBC: 141 /HPF / WBC 2 /HPF   Sq Epi: x / Non Sq Epi: 0 /HPF / Bacteria: Negative      ======================Micro/Rad/Cardio=================  Culture: Reviewed   CXR: Reviewed  Echo:Reviewed  ======================================================  PAST MEDICAL & SURGICAL HISTORY:  Hypertension      Diabetes  A1C 6.8  on admission      Former smoker      HLD (hyperlipidemia)      CAD (coronary artery disease)  reports angiogram - 1 year ago - pt reports non obstructive  at [facility]      CHF (congestive heart failure)  (EF 30%)      H/O fracture of wrist  and left ankle (ORIF ankle) following fall          ====================ASSESSMENT ==============  Pt is a 71M with PMHx DMII, CHF (30%), AICD 1yr ago, HLD. He is a former smoker (quit approx 30 years ago). Pt received his second COVID 19 booster shot this week. Per daughter, pt began having some new lower extremity edema last week and later developed a night time cough. His symptoms improved after a few days until today when his cough worsened and he became extremely tachypneic and short of breath. Daughter became very concerned and brought him to ED.   In the ED, pt was tachypneic, with systolic BP in the 70s maxed on levo. He was also on Bipap with RR 40s, TV 200s and not mentating well. Lactate was reportedly 11 and pH was 7. In the ED, he was started on milrinone and dobutamine and then shock team was activated. The patient was taken to cath lab and an Impella was placed.     ====================== NEUROLOGY=====================  No active issues  --continue propofol for sedation, RASS goal -1  --daily sedation vacation   - will transition to precedex for possible SBT in AM  propofol Infusion 30 MICROgram(s)/kG/Min (13.2 mL/Hr) IV Continuous <Continuous>    ==================== RESPIRATORY======================  Intubated, sedated  --continue vent support, attempt SBT today    Mechanical Ventilation:  Mode: AC/ CMV (Assist Control/ Continuous Mandatory Ventilation)  RR (machine): 16  TV (machine): 500  FiO2: 30  PEEP: 5  ITime: 1  MAP: 13  PIP: 26      ====================CARDIOVASCULAR==================  Cardiogenic Shock  --Continue dobutamine for inotropic support  --wean vasopressors for MAP > 65  --stop digoxin in the setting of bradycardia and renal failure   --Impella d/c'd   --consult EP, patient with a flutter, recommend beta blocker when off pressors   --CVP goal 8-10  --continue to trend cardiac indices and end organ perfusion   --holding statin given transaminitis   --eventual ischemic w/up?  --heart failure following, appreciate recs     digoxin  Injectable 250 MICROGram(s) IV Push every 6 hours  DOBUTamine Infusion 5 MICROgram(s)/kG/Min (11 mL/Hr) IV Continuous <Continuous>    ===================HEMATOLOGIC/ONC ===================  --heme consult placed for thrombocytopenia, likely 2/2 impella  --s/p cryo, ffp and platelets for line placements   --peripheral smear reviewed, unremarkable   --LDH downtrending, continue to monitor     aspirin enteric coated 81 milliGRAM(s) Oral daily  heparin 50 Units/mL (IMPELLA VAD) Purge Solution  Unit(s) (15.8 mL/Hr) Ventricular Assist Device <Continuous>    ===================== RENAL =========================  ALISSA  --ALISSA on CKD, unclear baseline Cr  --Would favor keeping slightly positive today given mixed shock picture  --Hemolysis likely contributing to renal injury  --will continue to monitor renal function closely, avoid nephrotoxic medications    ==================== GASTROINTESTINAL===================  --NPO for now  -- start TF     pantoprazole  Injectable 40 milliGRAM(s) IV Push two times a day    =======================    ENDOCRINE  =====================  Hx DM2   - holding home antidiabetic agents  - ISS   - monitor blood glucose       dextrose 50% Injectable 25 Gram(s) IV Push once  dextrose 50% Injectable 12.5 Gram(s) IV Push once  dextrose 50% Injectable 25 Gram(s) IV Push once  dextrose Oral Gel 15 Gram(s) Oral once  glucagon  Injectable 1 milliGRAM(s) IntraMuscular once  insulin lispro (ADMELOG) corrective regimen sliding scale   SubCutaneous three times a day before meals  vasopressin Infusion 0.04 Unit(s)/Min (2.4 mL/Hr) IV Continuous <Continuous>    ========================INFECTIOUS DISEASE================  --continue empiric antibiotics, de-escalate if appropriate   --f/u infectious w/up  --+ human metapneumovirus  --f/u CDiff    cefepime   IVPB 2000 milliGRAM(s) IV Intermittent every 12 hours  vancomycin  IVPB      vancomycin  IVPB 1000 milliGRAM(s) IV Intermittent every 24 hours      Lines:   Lines: RIJ TLC (-)  LRAL (-)  Adamson catheter (-)    Patient requires continuous monitoring with bedside rhythm monitoring, pulse ox monitoring, and intermittent blood gas analysis. Care plan discussed with ICU care team. Patient remained critical and at risk for life threatening decompensation.  Patient seen, examined and plan discussed with CCU team during rounds.     I have personally provided 35 minutes of critical care time excluding time spent on separate procedures, in addition to initial critical care time provided by the CICU Attending, Dr. Xiong

## 2022-05-12 NOTE — CONSULT NOTE ADULT - ASSESSMENT
70 y/o man with PMHx DMII, CHF (30%), Biotronik ICD (4/22/20), HLD. He is a former smoker (quit approx 30 years ago). Pt received his second COVID 19 booster shot this week. He is now intubated/sedated s/p Impella and is on Dobutamine and Vaso. EP consulted for management of AT.    1. Atrial tachycardia  2. Cardiogenic shock  3. HFrEF  4. Thrombocytopenia  5. Metapneumovirus     - Patient currently in AT with 4:1 conduction and overall ventricular rates in the 60-70s  - Unable to utilize AV luiz blockers due to hypotension/shock  - AAD unable to be used due to ALISSA/elevated LFTs  - Continue close telemetry monitoring  - Keep K>4 and Mg> 2 70 y/o man with PMHx DMII, CHF (30%), Biotronik ICD (4/22/20), HLD. He is a former smoker (quit approx 30 years ago). Pt received his second COVID 19 booster shot this week. He is now intubated/sedated s/p Impella and is on Dobutamine and Vaso. EP consulted for management of AT.    1. Atrial tachycardia  2. Cardiogenic shock  3. HFrEF  4. Thrombocytopenia  5. Metapneumovirus     - Patient currently in AT with 4:1 conduction and overall ventricular rates in the 60-70s  - Unable to utilize AV luiz blockers due to hypotension/shock  - AAD unable to be used due to ALISSA/elevated LFTs/CMP  - Would start low dose beta blocker when off pressor support  - Continue close telemetry monitoring  - Keep K>4 and Mg> 2 70 y/o man with PMHx DMII, CHF (30%), Biotronik ICD (4/22/20), HLD. He is a former smoker (quit approx 30 years ago). Pt received his second COVID 19 booster shot this week. He is now intubated/sedated s/p Impella and is on Dobutamine and Vaso. EP consulted for management of AT.    1. Atrial tachycardia  2. Cardiogenic shock  3. HFrEF  4. Thrombocytopenia  5. Metapneumovirus     - Patient currently in AT with variable conduction and overall ventricular rates in the 60-70s  - Unable to utilize AV luiz blockers due to hypotension/shock  - AAD unable to be used due to ALISSA/elevated LFTs/CMP  - Would start low dose beta blocker when off pressor support  - Continue close telemetry monitoring  - Keep K>4 and Mg> 2

## 2022-05-12 NOTE — CONSULT NOTE ADULT - ATTENDING COMMENTS
Patient is a 71 year old male with past medical history of HFrEF s/p ICD, CAD, T2DM who presents with SOB for several days and orthopnea.  His symptoms worsened and came to the emergency room. He was noted to be tachypneic and hypotensive. On labwork he was noted to have an ALISSA, elevated LFTs and lactic acid. He also was noted to be positive for metapneumovirus. He was intubated and started on dobutamine and milrinione. The shock team was activated. His presentation appeared to be a mixed cardiogenic and distributive picture. The decision was made to place an impella  Overnight and into the AM, milrinone was weaned off and his pressor support has been weaned off and he is only on low dose vasopressin. Filling pressures are low and cardiac output were normal on impella at P8 and dobutamine 5.   Echo showed an LVEF of <20%, LVEDD 5.6cm, mild/mod MR, reduced RV function  Recs:  Patient appears to be in a mixed cardiogenic/distributive shock. Current cardiac index is 2.6  Now patient is on P3. Will repeat HD on P3 to assess if patient can come off impella support since there are signs of hemulysis with rising LDH  Noted to have reducing urine output, possible ATN in the setting of initial shock.  Switch swan to neck  Continue to monitor daily perfusion markers  Agree with broad spectrum antibiotics in the setting of positive RVP and fevers

## 2022-05-12 NOTE — CONSULT NOTE ADULT - ATTENDING COMMENTS
71 M with PMHx DMII, CHF (LVEF 30%), AICD 1yr ago, HLD a/w SOB and tachypneic.   Started milrinone and dobutamine and then shock team was activated. The patient was taken to cath lab and an Impella was placed. Thereafter he was transferred to CICU, and he was on Dobutamine @10, Milrinone @.125, Vaso 0.08, Levo @ 1.0, intubated on PEEP 8 and fio2 100%.   Hematology consulted for thrombocytopenia 34 (on admission plt 80s) and r/o DIC in view of increased Ddimer 23338 and slightly decreased Fibrinogen 32    #Thrombocytopenia   #chronic history of increased Hb R/o polycytemia vera vs MDS/MPN   -peripheral blood smear reviewed : no evidence of hemolysis or dysplasia.   -Thrombocytopenia likely due to Impella use; no e/o TMA   -agree with 1 u Platelet transfusion before procedure; 10u cryoprecipitate transfusion in view of slightly decreased Fibrinogen   -monitor Coagulation panel and CBC/platelet   -LDH/uric acid, haptoglobin,   -send HIT panel including Heparin PF4 antibody and serotonin release assay  -Check  JAK2, MPL, and GALE mutation

## 2022-05-12 NOTE — CONSULT NOTE ADULT - SUBJECTIVE AND OBJECTIVE BOX
HPI:   History obtained from wife, daughter and charts.     Pt is a 72 yo M with PMHx DMII, CHF (LVEF 30%), AICD 1yr ago, HLD. He is a former smoker (quit approx 30 years ago). Pt received his second COVID 19 booster shot this week. Per daughter, pt began having some new lower extremity edema last week and later developed a night time cough. His symptoms improved after a few days until 22 when his cough worsened and he became extremely tachypneic and short of breath. Daughter became very concerned and brought him to ED. At ED, pt was tachypneic, with systolic BP in the 70s maxed on levo. He was also on Bipap with RR 40s, TV 200s and not mentating well. Lactate was reportedly 11 and pH was 7. Started milrinone and dobutamine and then shock team was activated. The patient was taken to cath lab and an Impella was placed. Thereafter he was transferred to CICU, and he was on Dobutamine @10, Milrinone @.125, Vaso 0.08, Levo @ 1.0, intubated on PEEP 8 and fio2 100%.       To be noted, daughter and his wife at the bedside reported that pt has chronic increased Hb and followed outside hematologist and his PCP who recommended intermittent phlebotomy. Daughter mentioned that recently "mutations panel sent out by his doctor". Denied previous history of bleeding or blood clot formation. Denied use of blood thinners.       PAST MEDICAL & SURGICAL HISTORY:  Hypertension      Diabetes  A1C 6.8  on admission      Former smoker      HLD (hyperlipidemia)      CAD (coronary artery disease)  reports angiogram - 1 year ago - pt reports non obstructive  at [facility]      CHF (congestive heart failure)  (EF 30%)      H/O fracture of wrist  and left ankle (ORIF ankle) following fall          Allergies    No Known Allergies    Intolerances        MEDICATIONS  (STANDING):  aspirin  chewable 81 milliGRAM(s) Enteral Tube daily  cefepime   IVPB 2000 milliGRAM(s) IV Intermittent every 12 hours  chlorhexidine 0.12% Liquid 15 milliLiter(s) Oral Mucosa every 12 hours  chlorhexidine 4% Liquid 1 Application(s) Topical <User Schedule>  dextrose 50% Injectable 25 Gram(s) IV Push once  dextrose 50% Injectable 12.5 Gram(s) IV Push once  dextrose 50% Injectable 25 Gram(s) IV Push once  dextrose Oral Gel 15 Gram(s) Oral once  DOBUTamine Infusion 5 MICROgram(s)/kG/Min (11 mL/Hr) IV Continuous <Continuous>  glucagon  Injectable 1 milliGRAM(s) IntraMuscular once  heparin 50 Units/mL (IMPELLA VAD) Purge Solution  Unit(s) (15.8 mL/Hr) Ventricular Assist Device <Continuous>  insulin lispro (ADMELOG) corrective regimen sliding scale   SubCutaneous three times a day before meals  pantoprazole  Injectable 40 milliGRAM(s) IV Push two times a day  propofol Infusion 30 MICROgram(s)/kG/Min (13.2 mL/Hr) IV Continuous <Continuous>  vancomycin  IVPB      vancomycin  IVPB 1000 milliGRAM(s) IV Intermittent every 24 hours  vasopressin Infusion 0.04 Unit(s)/Min (2.4 mL/Hr) IV Continuous <Continuous>    MEDICATIONS  (PRN):      FAMILY HISTORY:  Family history of stroke  father  59 yr    Family history of diabetes mellitus  brother  complications of DM age 50 yr    Family history of heart attack  mother  57 yr        SOCIAL HISTORY: No EtOH, no tobacco    REVIEW OF SYSTEMS:  unable to review due to intubation       Height (cm): 167.6 ( @ :11)  Weight (kg): 73.5 ( @ :11)  BMI (kg/m2): 26.2 ( @ :11)  BSA (m2): 1.83 ( @ :11)    T(F): 101.1 (22 @ 06:45), Max: 101.1 (22 @ 05:30)  HR: 69 (22 @ 13:45)  BP: 98/71 (22 @ 07:45)  RR: 16 (22 @ 13:45)  SpO2: 98% (22 @ 13:45)  Wt(kg): --    GENERAL: intubated; sedated  HEAD:  Atraumatic, Normocephalic  EYES: conjunctiva and sclera clear  NECK: Supple, No JVD  CHEST/LUNG:  sounds bilaterally; No wheeze  HEART:  No murmurs, rubs, or gallops  ABDOMEN: Soft, Nontender, Nondistended; Bowel sounds present  EXTREMITIES:  2+ Peripheral Pulses, No clubbing, cyanosis, or edema  NEUROLOGY: sedated   SKIN: No rashes or lesions                          11.9   11.83 )-----------( 34       ( 12 May 2022 10:01 )             36.8           149<H>  |  101  |  46<H>  ----------------------------<  177<H>  4.1   |  19<L>  |  2.94<H>    Ca    8.8      12 May 2022 08:59  Phos  5.7       Mg     2.0         TPro  4.4<L>  /  Alb  2.5<L>  /  TBili  4.4<H>  /  DBili  x   /  AST  8919<H>  /  ALT  6524<H>  /  AlkPhos  89        Lactate Dehydrogenase, Serum: 52062 U/L ( @ 08:59)  Magnesium, Serum: 2.0 mg/dL ( @ 08:59)  Phosphorus Level, Serum: 5.7 mg/dL ( @ 08:59)  Magnesium, Serum: 2.0 mg/dL ( @ 04:39)  Phosphorus Level, Serum: 5.6 mg/dL ( @ 04:39)  Magnesium, Serum: 2.1 mg/dL ( @ 01:06)  Phosphorus Level, Serum: 6.5 mg/dL ( @ 01:06)  Lactate Dehydrogenase, Serum: 7655 U/L ( @ 00:08)  Lactate Dehydrogenase, Serum: 3875 U/L ( @ 22:39)  Magnesium, Serum: 2.2 mg/dL ( @ 22:39)  Phosphorus Level, Serum: 8.8 mg/dL ( @ 22:39)      PT/INR - ( 12 May 2022 09:07 )   PT: 42.2 sec;   INR: 3.62 ratio         PTT - ( 12 May 2022 09:07 )  PTT:79.1 sec

## 2022-05-12 NOTE — CONSULT NOTE ADULT - ASSESSMENT
71M hx CAD, CHF (EF30%), DMII, HLD, p/w SOB and malaise found to be in cardiogenic shock requiring intubation and impella placement. Consult called for co medical mgmt.    Cardiogenic Shock:  Intubated for airway protection and tachypnea in ED  Off sedation, pt had no neuro deficits  Pt stated on Milrinone, Dobutamine, Vaso and Levo in ED   Impella placed in cath lab on P8   Wean vasopressors as tolerated  IV Abx   Contact precautions for Metapneumovirus   Care per CICU    DM2:  Holding home antidiabetic agents  ISS   Monitor blood glucose   A1C 7.7&    HLD:  Statin being held 2/2 elevated liver enzymes     ALISSA:  Cr 1.8 on admission now 2.94  s/p Lasix 80 and 40 in ED  Bumex started in CICU  Monitor SCr and urine output closely     Thrombocytopenia:  Plt on admission 81, rpt 33  Monitor CBC closely    GI ppx:  Protonix BID     Prophylactic Measures:   Daughter is palliative care physician; family signed MOLST form and pt is now DNR   Family stated they will evaluate how patient does overnight and re-evaluate tomorrow if they would like to reconsider     [facility]  [phone]           71M hx CAD, CHF (EF30%), DMII, HLD, p/w SOB and malaise found to be in cardiogenic shock requiring intubation and impella placement. Medicine consult called for co medical mgmt.    # Cardiogenic Shock  # acute hypoxic resp failure  # HFrEF  # CAD  # DM2  # Metapneumovirus   # ALISSA  # thrombocytopenia  on vaso and dobutamine, wean vasopressors as tolerated  Impella placed  IV Abx   ro DIC  shock liver?  ISS  hold statin  appreciate CICU care    DNR    Thank you for this consult. Please call [facility] with questions [phone].

## 2022-05-12 NOTE — CONSULT NOTE ADULT - SUBJECTIVE AND OBJECTIVE BOX
CHIEF COMPLAINT: SOB    HISTORY OF PRESENT ILLNESS: 72 y/o man with PMHx DMII, CHF (30%), Biotronik ICD (20), HLD. He is a former smoker (quit approx 30 years ago). Pt received his second COVID 19 booster shot this week.     Per chart/daughter, pt began having some new lower extremity edema last week and later developed a night time cough. His symptoms improved after a few days until today when his cough worsened and he became extremely tachypneic and short of breath. Daughter became very concerned and brought him to ED. In the ED, pt was tachypneic, with systolic BP in the 70s maxed on levo. He was also on Bipap with RR 40s, TV 200s and not mentating well. Lactate was reportedly 11 and pH was 7. In the ED, he was started on milrinone and dobutamine and then shock team was activated. The patient was taken to cath lab and an Impella was placed. He remains intubated/sedated on propofol, Dobutamine and Vaso. His platelets noted to be 33 for which he was transfused. He was noted to be in an AT with rates into the 130-140s for which he was loaded with Digoxin. His ventricular rates now are in the 60-70s and Digoxin has since been stopped due to rising Cr. He is on abx for leukocytosis and fever.      Allergies  No Known Allergies    	    MEDICATIONS:  aspirin  chewable 81 milliGRAM(s) Enteral Tube daily  DOBUTamine Infusion 5 MICROgram(s)/kG/Min IV Continuous <Continuous>  heparin 50 Units/mL (IMPELLA VAD) Purge Solution  Unit(s) Ventricular Assist Device <Continuous>  cefepime   IVPB 2000 milliGRAM(s) IV Intermittent every 12 hours  vancomycin  IVPB      vancomycin  IVPB 1000 milliGRAM(s) IV Intermittent every 24 hours  propofol Infusion 30 MICROgram(s)/kG/Min IV Continuous <Continuous>  pantoprazole  Injectable 40 milliGRAM(s) IV Push two times a day  dextrose 50% Injectable 25 Gram(s) IV Push once  dextrose 50% Injectable 12.5 Gram(s) IV Push once  dextrose 50% Injectable 25 Gram(s) IV Push once  dextrose Oral Gel 15 Gram(s) Oral once  glucagon  Injectable 1 milliGRAM(s) IntraMuscular once  insulin lispro (ADMELOG) corrective regimen sliding scale   SubCutaneous three times a day before meals  vasopressin Infusion 0.04 Unit(s)/Min IV Continuous <Continuous>  chlorhexidine 0.12% Liquid 15 milliLiter(s) Oral Mucosa every 12 hours  chlorhexidine 4% Liquid 1 Application(s) Topical <User Schedule>      PAST MEDICAL & SURGICAL HISTORY:  Hypertension      Diabetes  A1C 6.8  on admission      Former smoker      HLD (hyperlipidemia)      CAD (coronary artery disease)  reports angiogram - 1 year ago - pt reports non obstructive  at [facility]      CHF (congestive heart failure)  (EF 30%)      H/O fracture of wrist  and left ankle (ORIF ankle) following fall          FAMILY HISTORY:  Family history of stroke  father  59 yr    Family history of diabetes mellitus  brother  complications of DM age 50 yr    Family history of heart attack  mother  57 yr        SOCIAL HISTORY:    Unable to obtain from pt as he is intubated/sedated      REVIEW OF SYSTEMS:  See HPI. Otherwise, 10 point ROS done and otherwise negative.    PHYSICAL EXAM:  T(C): 38.4 (22 @ 06:45), Max: 38.4 (22 @ 05:30)  HR: 67 (22 @ 13:15) (64 - 142)  BP: 98/71 (22 @ 07:45) (70/46 - 141/78)  RR: 16 (22 @ 13:15) (16 - 44)  SpO2: 99% (22 @ 13:15) (85% - 100%)  Wt(kg): --  I&O's Summary    11 May 2022 07:  -  12 May 2022 07:00  --------------------------------------------------------  IN: 1751.7 mL / OUT: 1180 mL / NET: 571.7 mL    12 May 2022 07:  -  12 May 2022 13:39  --------------------------------------------------------  IN: 1152 mL / OUT: 235 mL / NET: 917 mL        Appearance: Intubated	  Cardiovascular: Normal S1 S2, No JVD, No murmurs, No edema  Respiratory: Lungs clear to auscultation	  Psychiatry: sedated  Gastrointestinal:  Soft, Non-tender, + BS	  Skin: No rashes, No ecchymoses, No cyanosis	  Neurologic: sedated  Extremities edema        LABS:	 	    CBC Full  -  ( 12 May 2022 10:01 )  WBC Count : 11.83 K/uL  Hemoglobin : 11.9 g/dL  Hematocrit : 36.8 %  Platelet Count - Automated : 34 K/uL  Mean Cell Volume : 93.9 fl  Mean Cell Hemoglobin : 30.4 pg  Mean Cell Hemoglobin Concentration : 32.3 gm/dL  Auto Neutrophil # : 10.77 K/uL  Auto Lymphocyte # : 0.43 K/uL  Auto Monocyte # : 0.00 K/uL  Auto Eosinophil # : 0.00 K/uL  Auto Basophil # : 0.00 K/uL  Auto Neutrophil % : 81.2 %  Auto Lymphocyte % : 3.6 %  Auto Monocyte % : 0.0 %  Auto Eosinophil % : 0.0 %  Auto Basophil % : 0.0 %    05    149<H>  |  101  |  46<H>  ----------------------------<  177<H>  4.1   |  19<L>  |  2.94<H>  05-    147<H>  |  100  |  41<H>  ----------------------------<  159<H>  4.3   |  16<L>  |  2.44<H>    Ca    8.8      12 May 2022 08:59  Ca    9.3      12 May 2022 04:39  Phos  5.7     05-12  Phos  5.6     05-12  Mg     2.0     05-12  Mg     2.0     05-12    TPro  4.4<L>  /  Alb  2.5<L>  /  TBili  4.4<H>  /  DBili  x   /  AST  8919<H>  /  ALT  6524<H>  /  AlkPhos  89  05-12  TPro  4.6<L>  /  Alb  2.6<L>  /  TBili  3.5<H>  /  DBili  x   /  AST  7104<H>  /  ALT  5611<H>  /  AlkPhos  97        proBNP: Serum Pro-Brain Natriuretic Peptide: 33246 pg/mL ( @ 22:39)  Serum Pro-Brain Natriuretic Peptide: 88257 pg/mL ( @ 18:15)      TSH: Thyroid Stimulating Hormone, Serum: 4.14 uIU/mL ( @ 22:39)    Blood Gas Arterial, Lactate: 10.0 mmol/L (22 @ 08:37)      Creatine Kinase, Serum: 595 U/L (22 @ 04:39)  Creatine Kinase, Serum: 404 U/L (22 @ 22:39)      TELEMETRY: AT with rates 60-140s, currently 60s, PVCs, trigeminy        Echo: < from: TTE with Doppler (w/Cont) (22 @ 18:57) >  ROCEDURE: Transthoracic echocardiogram with 2-D, M-Mode  and complete spectral and color flow Doppler. Verbal  consent was obtained for injection of  Ultrasonic Enhancing  Agent following a discussion of risks and benefits.  Following intravenous injection of Ultrasonic Enhancing  Agent, harmonic imaging was performed.  INDICATION: Cardiogenic shock (R57.0)  ------------------------------------------------------------------------  Dimensions:    Normal Values:  LA:     4.1    2.0 - 4.0 cm  Ao:     3.5    2.0 - 3.8 cm  SEPTUM: 0.6    0.6 - 1.2 cm  PWT:    0.6    0.6 - 1.1 cm  LVIDd:  5.6    3.0 - 5.6 cm  LVIDs:  5.3    1.8 - 4.0 cm  Derived variables:  LVMI: 63 g/m2  RWT: 0.21  Fractional short: 5 %  EF (Yancey Rule): 11 %  ------------------------------------------------------------------------  Observations:  Mitral Valve: Mitral annular calcification, otherwise  normal mitral valve. Mild-moderate mitral regurgitation.  Aortic Valve/Aorta: Aortic valve leaflet morphology not  well visualized.  Normal aortic root size. (Ao: 3.5 cm at the sinuses of  Valsalva).  Left Atrium: Moderately dilated left atrium.  LA volume  index = 44 cc/m2.  Left Ventricle: Severe global left ventricular systolic  dysfunction.  Endocardial visualization enhanced with  intravenous injection of Ultrasonic Enhancing Agent  (Definity).  No LV thrombus seen. Normal left ventricular  internal dimensions and wall thickness.  Right Heart: Normal right atrium. Right ventricular  enlargement with decreased right ventricular systolic  function.  A device wire is noted in the right heart.  Normal tricuspid valve. Mild tricuspid regurgitation.  Normal pulmonic valve.  Pericardium/Pleura: Normal pericardium with no pericardial  effusion.  Hemodynamic: Estimated right atrial pressure is 8 mm Hg.  Estimated right ventricular systolic pressure equals 57 mm  Hg, assuming right atrial pressure equals 8 mm Hg,  consistent with moderate pulmonary hypertension.  ------------------------------------------------------------------------  Conclusions:  1. Mitral annular calcification, otherwise normal mitral  valve. Mild-moderate mitral regurgitation.  2. Moderately dilated left atrium.  LA volume index = 44  cc/m2.  3. Normal left ventricular internal dimensions and wall  thickness.  4. Severe global left ventricular systolic dysfunction.  Endocardial visualizationenhanced with intravenous  injection of Ultrasonic Enhancing Agent (Definity).  No LV  thrombus seen.  5. Right ventricular enlargement with decreased right  ventricular systolic function.  A device wire is noted in  the right heart.  6. Estimated right ventricular systolic pressure equals 57  mm Hg, assuming right atrial pressure equals 8 mm Hg,  consistent with moderate pulmonary hypertension.    < from: Xray Chest 1 View- PORTABLE-Urgent (22 @ 17:47) >  FINDINGS:  Heart/Vascular: Heartsize cannot be accurately assessed in this   projection. Left chest wall AICD with lead intact.  Pulmonary: No focal consolidation. No pneumothorax or pleural effusion.  Bones: No acute bony pathology.    Impression:  No focal consolidation.    < end of copied text >

## 2022-05-12 NOTE — GOALS OF CARE CONVERSATION - ADVANCED CARE PLANNING - CONVERSATION DETAILS
I discussed the patients prognosis and critical course in detail with the family. They expressed understanding that the patient is critically in and could demise overnight. The patient's wife asked her daughter, Josefina, to be in charge of decision making for the patient. She was in agreement as was her brother.     I discussed with the family that the patient is on very high doses of vasopressor medication and the critical care being provided. I asked the family if his heart were to stop if they would want us to perform CPR on the patient. The wife, daughter and son all stated they would not want CPR done at this time and would like to sign a DNR for now. They stated they will re-evaluate in the morning depending on how the patient does overnight if they would like to leave this in place. At this time, the family would like the patient to continue receiving all care as appropriate. They would not like to cap care or comfort care.     I discussed a MOLST form with the family and completed a DNR in the form with Josefina (daughter) per wife's wishes.     I provided emotional support to the family, explained the patients condition and treatments and will continue to make myself available for them.

## 2022-05-12 NOTE — CONSULT NOTE ADULT - SUBJECTIVE AND OBJECTIVE BOX
Patient seen and evaluated at bedside    Chief Complaint:    HPI:   History obtained from wife, daughter and charts.     Pt is a 71M with PMHx DMII, CHF (30%), AICD 1yr ago, HLD. He is a former smoker (quit approx 30 years ago). Pt received his second COVID 19 booster shot this week.     Per daughter, pt began having some new lower extremity edema last week and later developed a night time cough. His symptoms improved after a few days until today when his cough worsened and he became extremely tachypneic and short of breath. Daughter became very concerned and brought him to ED.     In the ED, pt was tachypneic, with systolic BP in the 70s maxed on levo. He was also on Bipap with RR 40s, TV 200s and not mentating well. Lactate was reportedly 11 and pH was 7. In the ED, he was started on milrinone and dobutamine and then shock team was activated. The patient was taken to cath lab and an Impella was placed.     Upon arrival to CICU, pt was on Dobutamine @10, Milrinone @.125, Vaso 0.08, Levo @ 1.0, intubated on PEEP 8 and fio2 100%   (11 May 2022 22:58)    72 yo M w/ PMH HFrEF, ICD, CAD, T2DM who presents with SOB. Per patient, he has been feeling SOB for several days. Also endorsed cough as well. He also reported orthopnea but denied LE edema or weight gain. Says he has been taking his meds daily including his lasix. His SOB has gotten worse today so his daughter brought him. In the ED, he was notably tachypenic and his BP was 60/30. Was started on levophed with max dose and also BiPAP. He was then started on dobutamine, milrionine, and also vasopressin. He was given lasix 2x in the ED as well as 2 amps of bicarb. He was ultimately intubated and sent to the cath lab for impella placement.     EKG shows Afib w/ LBBB       PMHx:   Hypertension    Diabetes    Former smoker    MI (myocardial infarction)    HLD (hyperlipidemia)    CAD (coronary artery disease)    CHF (congestive heart failure)        PSHx:   H/O fracture of wrist        Allergies:  No Known Allergies      Home Meds:    Current Medications:   aspirin enteric coated 81 milliGRAM(s) Oral daily  atorvastatin 80 milliGRAM(s) Oral at bedtime  buMETAnide Infusion 2 mG/Hr IV Continuous <Continuous>  calcium gluconate IVPB 2 Gram(s) IV Intermittent once  calcium gluconate IVPB 2 Gram(s) IV Intermittent once  calcium gluconate IVPB 2 Gram(s) IV Intermittent once  calcium gluconate IVPB 2 Gram(s) IV Intermittent once  cefepime   IVPB 2000 milliGRAM(s) IV Intermittent every 12 hours  chlorhexidine 0.12% Liquid 15 milliLiter(s) Oral Mucosa every 12 hours  chlorhexidine 4% Liquid 1 Application(s) Topical <User Schedule>  digoxin  Injectable 250 MICROGram(s) IV Push every 6 hours  DOBUTamine Infusion 7.5 MICROgram(s)/kG/Min IV Continuous <Continuous>  heparin 50 Units/mL (IMPELLA VAD) Purge Solution  Unit(s) Ventricular Assist Device <Continuous>  midazolam Infusion 0.02 mG/kG/Hr IV Continuous <Continuous>  norepinephrine Infusion 0.8 MICROgram(s)/kG/Min IV Continuous <Continuous>  pantoprazole  Injectable 40 milliGRAM(s) IV Push two times a day  sodium bicarbonate  Injectable 50 milliEquivalent(s) IV Push every 5 minutes  vancomycin  IVPB      vancomycin  IVPB 1000 milliGRAM(s) IV Intermittent every 24 hours  vasopressin Infusion 0.01 Unit(s)/Min IV Continuous <Continuous>      FAMILY HISTORY:  Family history of stroke  father  59 yr    Family history of diabetes mellitus  brother  complications of DM age 50 yr    Family history of heart attack  mother  57 yr        Social History:  Smoking History:  Alcohol Use:  Drug Use:    REVIEW OF SYSTEMS:  Constitutional:     [x ] negative [ ] fevers [ ] chills [ ] weight loss [ ] weight gain  HEENT:                  [x ] negative [ ] dry eyes [ ] eye irritation [ ] postnasal drip [ ] nasal congestion  CV:                         [ x] negative  [ ] chest pain [ ] orthopnea [ ] palpitations [ ] murmur  Resp:                     [x ] negative [ ] cough [ ] shortness of breath [ ] dyspnea [ ] wheezing [ ] sputum [ ]hemoptysis  GI:                          [ x] negative [ ] nausea [ ] vomiting [ ] diarrhea [ ] constipation [ ] abd pain [ ] dysphagia   :                        [ x] negative [ ] dysuria [ ] nocturia [ ] hematuria [ ] increased urinary frequency  Musculoskeletal: [x ] negative [ ] back pain [ ] myalgias [ ] arthralgias [ ] fracture  Skin:                       [ x] negative [ ] rash [ ] itch  Neurological:        [ x] negative [ ] headache [ ] dizziness [ ] syncope [ ] weakness [ ] numbness  Psychiatric:           [ x] negative [ ] anxiety [ ] depression  Endocrine:            [ x] negative [ ] diabetes [ ] thyroid problem  Heme/Lymph:      [ x] negative [ ] anemia [ ] bleeding problem  Allergic/Immune: [ x] negative [ ] itchy eyes [ ] nasal discharge [ ] hives [ ] angioedema    [ x] All other systems negative  [ ] Unable to assess ROS due to      Physical Exam:  T(F): 98.8 (), Max: 98.8 ()  HR: 139 () (64 - 142)  BP: 99/61 () (70/46 - 141/78)  RR: 24 ()  SpO2: 92% ()  GENERAL: Intubated, sedated   ENT: Notable JVD   CHEST/LUNG: Decreased breaht sounds   HEART: Tachycardic rate and rhythm; No murmurs, rubs, or gallops  EXTREMITIES:  Cold to touch       Cardiovascular Diagnostic Testing:    ECG: Personally reviewed:    Echo: Personally reviewed:    Stress Testing:    Cath:    Imaging:    CXR: Personally reviewed    Labs: Personally reviewed                        13.6   7.36  )-----------( 33       ( 11 May 2022 22:39 )             43.5         152<H>  |  102  |  35<H>  ----------------------------<  100<H>  5.5<H>   |  19<L>  |  1.98<H>    Ca    7.0<L>      11 May 2022 22:39  Phos  8.8       Mg     2.2         TPro  4.3<L>  /  Alb  2.4<L>  /  TBili  2.2<H>  /  DBili  x   /  AST  1973<H>  /  ALT  1600<H>  /  AlkPhos  101      PT/INR - ( 12 May 2022 00:08 )   PT: 30.3 sec;   INR: 2.59 ratio         PTT - ( 12 May 2022 00:08 )  PTT:90.7 sec  Serum Pro-Brain Natriuretic Peptide: 44837 pg/mL ( @ 22:39)  Serum Pro-Brain Natriuretic Peptide: 57596 pg/mL ( @ 18:15)

## 2022-05-12 NOTE — PROGRESS NOTE ADULT - SUBJECTIVE AND OBJECTIVE BOX
ALAN DE LA ROSA  MRN-[id number]  Patient is a 71y old  Male who presents with a chief complaint of Cardiogenic Shock (12 May 2022 01:30)    HPI:   History obtained from wife, daughter and charts.     Pt is a 71M with PMHx DMII, CHF (30%), AICD 1yr ago, HLD. He is a former smoker (quit approx 30 years ago). Pt received his second COVID 19 booster shot this week.     Per daughter, pt began having some new lower extremity edema last week and later developed a night time cough. His symptoms improved after a few days until today when his cough worsened and he became extremely tachypneic and short of breath. Daughter became very concerned and brought him to ED.     In the ED, pt was tachypneic, with systolic BP in the 70s maxed on levo. He was also on Bipap with RR 40s, TV 200s and not mentating well. Lactate was reportedly 11 and pH was 7. In the ED, he was started on milrinone and dobutamine and then shock team was activated. The patient was taken to cath lab and an Impella was placed.     Upon arrival to CICU, pt was on Dobutamine @10, Milrinone @.125, Vaso 0.08, Levo @ 1.0, intubated on PEEP 8 and fio2 100%   (11 May 2022 22:58)      Surgery/Hospital course:    Overnight events:    REVIEW OF SYSTEMS:    CONSTITUTIONAL: No weakness, fevers or chills  EYES/ENT: No visual changes;  No vertigo or throat pain   NECK: No pain or stiffness  RESPIRATORY: No cough, wheezing, hemoptysis; No shortness of breath  CARDIOVASCULAR: No chest pain or palpitations  GASTROINTESTINAL: No abdominal or epigastric pain. No nausea, vomiting, or hematemesis; No diarrhea or constipation. No melena or hematochezia.  GENITOURINARY: No dysuria, frequency or hematuria  NEUROLOGICAL: No numbness or weakness  SKIN: No itching, rashes      Physical Exam:  Vital Signs Last 24 Hrs  T(C): 38.4 (12 May 2022 06:45), Max: 38.4 (12 May 2022 05:30)  T(F): 101.1 (12 May 2022 06:45), Max: 101.1 (12 May 2022 05:30)  HR: 73 (12 May 2022 08:15) (64 - 142)  BP: 98/71 (12 May 2022 07:45) (70/46 - 141/78)  BP(mean): 80 (12 May 2022 07:45) (61 - 80)  RR: 16 (12 May 2022 08:15) (16 - 44)  SpO2: 94% (12 May 2022 08:15) (85% - 100%)  Physical Exam:   Constitutional: NAD, well-groomed, well-developed  HEENT: PERRLA, EOMI, no drainage or redness  Neck: supple,  No JVD  Respiratory: Breath Sounds equal & clear bilaterally to auscultation, no rales/rhonchi/wheezing, no accessory muscle use noted  Cardiovascular: Regular rate, regular rhythm, normal S1, S2; no murmurs or rub  Gastrointestinal: Soft, non-tender, non distended, + bowel sounds  Extremities: GUZMÁN x 4, no peripheral edema, no cyanosis, no clubbing   Neurological: A+O x 3; speech clear and intact; no sensory, motor  deficits, normal reflexes  Skin: warm, dry, well perfused  Incisions:    ============================I/O===========================   I&O's Detail    11 May 2022 07:01  -  12 May 2022 07:00  --------------------------------------------------------  IN:    Bumetanide: 5 mL    DOBUTamine: 22 mL    DOBUTamine: 132 mL    DOBUTamine: 11 mL    IV PiggyBack: 900 mL    IV PiggyBack: 116.9 mL    IV PiggyBack: 50 mL    Midazolam: 51.8 mL    Norepinephrine: 121.1 mL    Norepinephrine: 275.6 mL    Propofol: 8.8 mL    Vasopressin: 6 mL    Vasopressin: 36.6 mL  Total IN: 1736.8 mL    OUT:    Voided (mL): 1180 mL  Total OUT: 1180 mL    Total NET: 556.8 mL      12 May 2022 07:01  -  12 May 2022 08:40  --------------------------------------------------------  IN:    DOBUTamine: 11 mL    Vasopressin: 6 mL  Total IN: 17 mL    OUT:    Propofol: 0 mL    Voided (mL): 75 mL  Total OUT: 75 mL    Total NET: -58 mL        ============================ LABS =========================                        13.0   14.93 )-----------( 33       ( 12 May 2022 04:39 )             40.4     05-12    147<H>  |  100  |  41<H>  ----------------------------<  159<H>  4.3   |  16<L>  |  2.44<H>    Ca    9.3      12 May 2022 04:39  Phos  5.6     05-12  Mg     2.0     05-12    TPro  4.6<L>  /  Alb  2.6<L>  /  TBili  3.5<H>  /  DBili  x   /  AST  7104<H>  /  ALT  5611<H>  /  AlkPhos  97  05-12    LIVER FUNCTIONS - ( 12 May 2022 04:39 )  Alb: 2.6 g/dL / Pro: 4.6 g/dL / ALK PHOS: 97 U/L / ALT: 5611 U/L / AST: 7104 U/L / GGT: x           PT/INR - ( 12 May 2022 04:39 )   PT: 37.5 sec;   INR: 3.20 ratio         PTT - ( 12 May 2022 04:39 )  PTT:70.3 sec  ABG - ( 12 May 2022 04:23 )  pH, Arterial: 7.42  pH, Blood: x     /  pCO2: 30    /  pO2: 129   / HCO3: 20    / Base Excess: -3.9  /  SaO2: 98.6              Urinalysis Basic - ( 11 May 2022 22:13 )    Color: Yellow / Appearance: Clear / S.021 / pH: x  Gluc: x / Ketone: Trace  / Bili: Negative / Urobili: <2 mg/dL   Blood: x / Protein: 30 mg/dL / Nitrite: Negative   Leuk Esterase: Negative / RBC: 141 /HPF / WBC 2 /HPF   Sq Epi: x / Non Sq Epi: 0 /HPF / Bacteria: Negative      ======================Micro/Rad/Cardio=================  Culture: Reviewed   CXR: Reviewed  Echo:Reviewed  ======================================================  PAST MEDICAL & SURGICAL HISTORY:  Hypertension      Diabetes  A1C 6.8  on admission      Former smoker      HLD (hyperlipidemia)      CAD (coronary artery disease)  reports angiogram - 1 year ago - pt reports non obstructive  at [facility]      CHF (congestive heart failure)  (EF 30%)      H/O fracture of wrist  and left ankle (ORIF ankle) following fall        ====================ASSESSMENT ==============  CNS:  RES:  CVS:  Hem:  Shin:  GI:  Endo:  ID:  Skin  Plan:  ====================== NEUROLOGY=====================  propofol Infusion 30 MICROgram(s)/kG/Min (13.2 mL/Hr) IV Continuous <Continuous>    ==================== RESPIRATORY======================  Mechanical Ventilation:  Mode: AC/ CMV (Assist Control/ Continuous Mandatory Ventilation)  RR (machine): 16  TV (machine): 500  FiO2: 30  PEEP: 5  ITime: 1  MAP: 13  PIP: 26      ====================CARDIOVASCULAR==================  digoxin  Injectable 250 MICROGram(s) IV Push every 6 hours  DOBUTamine Infusion 5 MICROgram(s)/kG/Min (11 mL/Hr) IV Continuous <Continuous>    ===================HEMATOLOGIC/ONC ===================  aspirin enteric coated 81 milliGRAM(s) Oral daily  heparin 50 Units/mL (IMPELLA VAD) Purge Solution  Unit(s) (15.8 mL/Hr) Ventricular Assist Device <Continuous>    ===================== RENAL =========================  Continue monitoring urine output    ==================== GASTROINTESTINAL===================  pantoprazole  Injectable 40 milliGRAM(s) IV Push two times a day    =======================    ENDOCRINE  =====================  dextrose 50% Injectable 25 Gram(s) IV Push once  dextrose 50% Injectable 12.5 Gram(s) IV Push once  dextrose 50% Injectable 25 Gram(s) IV Push once  dextrose Oral Gel 15 Gram(s) Oral once  glucagon  Injectable 1 milliGRAM(s) IntraMuscular once  insulin lispro (ADMELOG) corrective regimen sliding scale   SubCutaneous three times a day before meals  vasopressin Infusion 0.04 Unit(s)/Min (2.4 mL/Hr) IV Continuous <Continuous>    ========================INFECTIOUS DISEASE================  cefepime   IVPB 2000 milliGRAM(s) IV Intermittent every 12 hours  vancomycin  IVPB      vancomycin  IVPB 1000 milliGRAM(s) IV Intermittent every 24 hours      ========================PROPHYLACTIC MEASURE================  DVT  GI Protonix  Graft patency   Beta blocker      Patient requires continuous monitoring with bedside rhythm monitoring, arterial line, pulse oximetry, ventilator monitoring and intermittent blood gas analysis.  Care plan discussed with ICU care team.  Patient remained critical; required more than usual post op care; I have spent 35 minutes providing non-routine post op care, revaluated multiple times during the day.         ALAN DE LA ROSA  [id number]  Patient is a 71y old  Male who presents with a chief complaint of Cardiogenic Shock (12 May 2022 01:30)    HPI:   History obtained from wife, daughter and charts.     Pt is a 71M with PMHx DMII, CHF (30%), AICD 1yr ago, HLD. He is a former smoker (quit approx 30 years ago). Pt received his second COVID 19 booster shot this week.     Per daughter, pt began having some new lower extremity edema last week and later developed a night time cough. His symptoms improved after a few days until today when his cough worsened and he became extremely tachypneic and short of breath. Daughter became very concerned and brought him to ED.     Surgery/Hospital course:  In the ED, pt was tachypneic, with systolic BP in the 70s maxed on levo. He was also on Bipap with RR 40s, TV 200s and not mentating well. Lactate was reportedly 11 and pH was 7. In the ED, he was started on milrinone and dobutamine and then shock team was activated. The patient was taken to cath lab and an Impella was placed.     Upon arrival to CICU, pt was on Dobutamine @10, Milrinone @.125, Vaso 0.08, Levo @ 1.0, intubated on PEEP 8 and fio2 100%   (11 May 2022 22:58)      Overnight events: pressors/inotropes de-escalated     REVIEW OF SYSTEMS: deferred, intubated and sedated     Vital Signs Last 24 Hrs  T(C): 38.4 (12 May 2022 06:45), Max: 38.4 (12 May 2022 05:30)  T(F): 101.1 (12 May 2022 06:45), Max: 101.1 (12 May 2022 05:30)  HR: 73 (12 May 2022 08:15) (64 - 142)  BP: 98/71 (12 May 2022 07:45) (70/46 - 141/78)  BP(mean): 80 (12 May 2022 07:45) (61 - 80)  RR: 16 (12 May 2022 08:15) (16 - 44)  SpO2: 94% (12 May 2022 08:15) (85% - 100%)    Physical Exam:   Constitutional: no active distress   HEENT: PERRLA, EOMI, no drainage or redness  Neck: supple,  No JVD  Respiratory: Breath Sounds equal & clear bilaterally to auscultation, good air entry   Cardiovascular: aflutter, s1s2  Gastrointestinal: Soft, non-tender, non distended, + bowel sounds  Extremities: GUZMÁN x 4, no peripheral edema, no cyanosis, no clubbing   Neurological: sedated   Skin: warm, dry, well perfused  Incisions:    ============================I/O===========================   I&O's Detail    11 May 2022 07:  -  12 May 2022 07:00  --------------------------------------------------------  IN:    Bumetanide: 5 mL    DOBUTamine: 22 mL    DOBUTamine: 132 mL    DOBUTamine: 11 mL    IV PiggyBack: 900 mL    IV PiggyBack: 116.9 mL    IV PiggyBack: 50 mL    Midazolam: 51.8 mL    Norepinephrine: 121.1 mL    Norepinephrine: 275.6 mL    Propofol: 8.8 mL    Vasopressin: 6 mL    Vasopressin: 36.6 mL  Total IN: 1736.8 mL    OUT:    Voided (mL): 1180 mL  Total OUT: 1180 mL    Total NET: 556.8 mL      12 May 2022 07:01  -  12 May 2022 08:40  --------------------------------------------------------  IN:    DOBUTamine: 11 mL    Vasopressin: 6 mL  Total IN: 17 mL    OUT:    Propofol: 0 mL    Voided (mL): 75 mL  Total OUT: 75 mL    Total NET: -58 mL        ============================ LABS =========================                        13.0   14.93 )-----------( 33       ( 12 May 2022 04:39 )             40.4     05-12    147<H>  |  100  |  41<H>  ----------------------------<  159<H>  4.3   |  16<L>  |  2.44<H>    Ca    9.3      12 May 2022 04:39  Phos  5.6     05  Mg     2.0         TPro  4.6<L>  /  Alb  2.6<L>  /  TBili  3.5<H>  /  DBili  x   /  AST  7104<H>  /  ALT  5611<H>  /  AlkPhos  97  12    LIVER FUNCTIONS - ( 12 May 2022 04:39 )  Alb: 2.6 g/dL / Pro: 4.6 g/dL / ALK PHOS: 97 U/L / ALT: 5611 U/L / AST: 7104 U/L / GGT: x           PT/INR - ( 12 May 2022 04:39 )   PT: 37.5 sec;   INR: 3.20 ratio         PTT - ( 12 May 2022 04:39 )  PTT:70.3 sec  ABG - ( 12 May 2022 04:23 )  pH, Arterial: 7.42  pH, Blood: x     /  pCO2: 30    /  pO2: 129   / HCO3: 20    / Base Excess: -3.9  /  SaO2: 98.6              Urinalysis Basic - ( 11 May 2022 22:13 )    Color: Yellow / Appearance: Clear / S.021 / pH: x  Gluc: x / Ketone: Trace  / Bili: Negative / Urobili: <2 mg/dL   Blood: x / Protein: 30 mg/dL / Nitrite: Negative   Leuk Esterase: Negative / RBC: 141 /HPF / WBC 2 /HPF   Sq Epi: x / Non Sq Epi: 0 /HPF / Bacteria: Negative      ======================Micro/Rad/Cardio=================  Culture: Reviewed   CXR: Reviewed  Echo:Reviewed  ======================================================  PAST MEDICAL & SURGICAL HISTORY:  Hypertension      Diabetes  A1C 6.8  on admission      Former smoker      HLD (hyperlipidemia)      CAD (coronary artery disease)  reports angiogram - 1 year ago - pt reports non obstructive  at [facility]      CHF (congestive heart failure)  (EF 30%)      H/O fracture of wrist  and left ankle (ORIF ankle) following fall        ====================ASSESSMENT ==============  Pt is a 71M with PMHx DMII, CHF (30%), AICD 1yr ago, HLD. He is a former smoker (quit approx 30 years ago). Pt received his second COVID 19 booster shot this week. Per daughter, pt began having some new lower extremity edema last week and later developed a night time cough. His symptoms improved after a few days until today when his cough worsened and he became extremely tachypneic and short of breath. Daughter became very concerned and brought him to ED.   In the ED, pt was tachypneic, with systolic BP in the 70s maxed on levo. He was also on Bipap with RR 40s, TV 200s and not mentating well. Lactate was reportedly 11 and pH was 7. In the ED, he was started on milrinone and dobutamine and then shock team was activated. The patient was taken to cath lab and an Impella was placed.       ====================== NEUROLOGY=====================  --continue propofol for sedation, RASS goal -1  --daily sedation vacation   propofol Infusion 30 MICROgram(s)/kG/Min (13.2 mL/Hr) IV Continuous <Continuous>    ==================== RESPIRATORY======================  --continue vent support, defer SBT today  --vent bundle   Mechanical Ventilation:  Mode: AC/ CMV (Assist Control/ Continuous Mandatory Ventilation)  RR (machine): 16  TV (machine): 500  FiO2: 30  PEEP: 5  ITime: 1  MAP: 13  PIP: 26      ====================CARDIOVASCULAR==================  digoxin  Injectable 250 MICROGram(s) IV Push every 6 hours  DOBUTamine Infusion 5 MICROgram(s)/kG/Min (11 mL/Hr) IV Continuous <Continuous>    ===================HEMATOLOGIC/ONC ===================  aspirin enteric coated 81 milliGRAM(s) Oral daily  heparin 50 Units/mL (IMPELLA VAD) Purge Solution  Unit(s) (15.8 mL/Hr) Ventricular Assist Device <Continuous>    ===================== RENAL =========================  Continue monitoring urine output    ==================== GASTROINTESTINAL===================  pantoprazole  Injectable 40 milliGRAM(s) IV Push two times a day    =======================    ENDOCRINE  =====================  dextrose 50% Injectable 25 Gram(s) IV Push once  dextrose 50% Injectable 12.5 Gram(s) IV Push once  dextrose 50% Injectable 25 Gram(s) IV Push once  dextrose Oral Gel 15 Gram(s) Oral once  glucagon  Injectable 1 milliGRAM(s) IntraMuscular once  insulin lispro (ADMELOG) corrective regimen sliding scale   SubCutaneous three times a day before meals  vasopressin Infusion 0.04 Unit(s)/Min (2.4 mL/Hr) IV Continuous <Continuous>    ========================INFECTIOUS DISEASE================  cefepime   IVPB 2000 milliGRAM(s) IV Intermittent every 12 hours  vancomycin  IVPB      vancomycin  IVPB 1000 milliGRAM(s) IV Intermittent every 24 hours      ========================PROPHYLACTIC MEASURE================  DVT  GI Protonix  Graft patency   Beta blocker      Patient requires continuous monitoring with bedside rhythm monitoring, arterial line, pulse oximetry, ventilator monitoring and intermittent blood gas analysis.  Care plan discussed with ICU care team.  Patient remained critical; required more than usual post op care; I have spent 35 minutes providing non-routine post op care, revaluated multiple times during the day.         ALAN DE LA ROSA  MRN-[id number]  Patient is a 71y old  Male who presents with a chief complaint of Cardiogenic Shock (12 May 2022 01:30)    HPI:   History obtained from wife, daughter and charts.     Pt is a 71M with PMHx DMII, CHF (30%), AICD 1yr ago, HLD. He is a former smoker (quit approx 30 years ago). Pt received his second COVID 19 booster shot this week.     Per daughter, pt began having some new lower extremity edema last week and later developed a night time cough. His symptoms improved after a few days until today when his cough worsened and he became extremely tachypneic and short of breath. Daughter became very concerned and brought him to ED.     Surgery/Hospital course:  In the ED, pt was tachypneic, with systolic BP in the 70s maxed on levo. He was also on Bipap with RR 40s, TV 200s and not mentating well. Lactate was reportedly 11 and pH was 7. In the ED, he was started on milrinone and dobutamine and then shock team was activated. The patient was taken to cath lab and an Impella was placed.     Upon arrival to CICU, pt was on Dobutamine @10, Milrinone @.125, Vaso 0.08, Levo @ 1.0, intubated on PEEP 8 and fio2 100%   (11 May 2022 22:58)      Overnight events: pressors/inotropes de-escalated     REVIEW OF SYSTEMS: deferred, intubated and sedated     Vital Signs Last 24 Hrs  T(C): 38.4 (12 May 2022 06:45), Max: 38.4 (12 May 2022 05:30)  T(F): 101.1 (12 May 2022 06:45), Max: 101.1 (12 May 2022 05:30)  HR: 73 (12 May 2022 08:15) (64 - 142)  BP: 98/71 (12 May 2022 07:45) (70/46 - 141/78)  BP(mean): 80 (12 May 2022 07:45) (61 - 80)  RR: 16 (12 May 2022 08:15) (16 - 44)  SpO2: 94% (12 May 2022 08:15) (85% - 100%)    Physical Exam:   Constitutional: no active distress   HEENT: PERRLA, EOMI, no drainage or redness  Neck: supple,  No JVD  Respiratory: Breath Sounds equal & clear bilaterally to auscultation, good air entry   Cardiovascular: aflutter, s1s2  Gastrointestinal: Soft, non-tender, non distended, + bowel sounds  Extremities: GUZMÁN x 4, no peripheral edema, no cyanosis, no clubbing   Neurological: sedated   Skin: warm, dry, well perfused  Incisions:    ============================I/O===========================   I&O's Detail    11 May 2022 07:  -  12 May 2022 07:00  --------------------------------------------------------  IN:    Bumetanide: 5 mL    DOBUTamine: 22 mL    DOBUTamine: 132 mL    DOBUTamine: 11 mL    IV PiggyBack: 900 mL    IV PiggyBack: 116.9 mL    IV PiggyBack: 50 mL    Midazolam: 51.8 mL    Norepinephrine: 121.1 mL    Norepinephrine: 275.6 mL    Propofol: 8.8 mL    Vasopressin: 6 mL    Vasopressin: 36.6 mL  Total IN: 1736.8 mL    OUT:    Voided (mL): 1180 mL  Total OUT: 1180 mL    Total NET: 556.8 mL      12 May 2022 07:01  -  12 May 2022 08:40  --------------------------------------------------------  IN:    DOBUTamine: 11 mL    Vasopressin: 6 mL  Total IN: 17 mL    OUT:    Propofol: 0 mL    Voided (mL): 75 mL  Total OUT: 75 mL    Total NET: -58 mL        ============================ LABS =========================                        13.0   14.93 )-----------( 33       ( 12 May 2022 04:39 )             40.4     05-12    147<H>  |  100  |  41<H>  ----------------------------<  159<H>  4.3   |  16<L>  |  2.44<H>    Ca    9.3      12 May 2022 04:39  Phos  5.6     05  Mg     2.0         TPro  4.6<L>  /  Alb  2.6<L>  /  TBili  3.5<H>  /  DBili  x   /  AST  7104<H>  /  ALT  5611<H>  /  AlkPhos  97  12    LIVER FUNCTIONS - ( 12 May 2022 04:39 )  Alb: 2.6 g/dL / Pro: 4.6 g/dL / ALK PHOS: 97 U/L / ALT: 5611 U/L / AST: 7104 U/L / GGT: x           PT/INR - ( 12 May 2022 04:39 )   PT: 37.5 sec;   INR: 3.20 ratio         PTT - ( 12 May 2022 04:39 )  PTT:70.3 sec  ABG - ( 12 May 2022 04:23 )  pH, Arterial: 7.42  pH, Blood: x     /  pCO2: 30    /  pO2: 129   / HCO3: 20    / Base Excess: -3.9  /  SaO2: 98.6              Urinalysis Basic - ( 11 May 2022 22:13 )    Color: Yellow / Appearance: Clear / S.021 / pH: x  Gluc: x / Ketone: Trace  / Bili: Negative / Urobili: <2 mg/dL   Blood: x / Protein: 30 mg/dL / Nitrite: Negative   Leuk Esterase: Negative / RBC: 141 /HPF / WBC 2 /HPF   Sq Epi: x / Non Sq Epi: 0 /HPF / Bacteria: Negative      ======================Micro/Rad/Cardio=================  Culture: Reviewed   CXR: Reviewed  Echo:Reviewed  ======================================================  PAST MEDICAL & SURGICAL HISTORY:  Hypertension      Diabetes  A1C 6.8  on admission      Former smoker      HLD (hyperlipidemia)      CAD (coronary artery disease)  reports angiogram - 1 year ago - pt reports non obstructive  at [facility]      CHF (congestive heart failure)  (EF 30%)      H/O fracture of wrist  and left ankle (ORIF ankle) following fall        ====================ASSESSMENT ==============  Pt is a 71M with PMHx DMII, CHF (30%), AICD 1yr ago, HLD. He is a former smoker (quit approx 30 years ago). Pt received his second COVID 19 booster shot this week. Per daughter, pt began having some new lower extremity edema last week and later developed a night time cough. His symptoms improved after a few days until today when his cough worsened and he became extremely tachypneic and short of breath. Daughter became very concerned and brought him to ED.   In the ED, pt was tachypneic, with systolic BP in the 70s maxed on levo. He was also on Bipap with RR 40s, TV 200s and not mentating well. Lactate was reportedly 11 and pH was 7. In the ED, he was started on milrinone and dobutamine and then shock team was activated. The patient was taken to cath lab and an Impella was placed.       ====================== NEUROLOGY=====================  --continue propofol for sedation, RASS goal -1  --daily sedation vacation   propofol Infusion 30 MICROgram(s)/kG/Min (13.2 mL/Hr) IV Continuous <Continuous>    ==================== RESPIRATORY======================  --continue vent support, defer SBT today  --vent bundle   Mechanical Ventilation:  Mode: AC/ CMV (Assist Control/ Continuous Mandatory Ventilation)  RR (machine): 16  TV (machine): 500  FiO2: 30  PEEP: 5  ITime: 1  MAP: 13  PIP: 26      ====================CARDIOVASCULAR==================  --Continue dobutamine for inotropic support  --wean vasopressors for MAP > 65  --stop digoxin in the setting of bradycardia and renal failure   --Wean Impella as tolerated, consider removing if tolerates wean given hemolysis   --consult EP for PPM interrogation, patient with new a flutter   --CVP goal 8-10    digoxin  Injectable 250 MICROGram(s) IV Push every 6 hours  DOBUTamine Infusion 5 MICROgram(s)/kG/Min (11 mL/Hr) IV Continuous <Continuous>    ===================HEMATOLOGIC/ONC ===================  aspirin enteric coated 81 milliGRAM(s) Oral daily  heparin 50 Units/mL (IMPELLA VAD) Purge Solution  Unit(s) (15.8 mL/Hr) Ventricular Assist Device <Continuous>    ===================== RENAL =========================  Continue monitoring urine output    ==================== GASTROINTESTINAL===================  pantoprazole  Injectable 40 milliGRAM(s) IV Push two times a day    =======================    ENDOCRINE  =====================  dextrose 50% Injectable 25 Gram(s) IV Push once  dextrose 50% Injectable 12.5 Gram(s) IV Push once  dextrose 50% Injectable 25 Gram(s) IV Push once  dextrose Oral Gel 15 Gram(s) Oral once  glucagon  Injectable 1 milliGRAM(s) IntraMuscular once  insulin lispro (ADMELOG) corrective regimen sliding scale   SubCutaneous three times a day before meals  vasopressin Infusion 0.04 Unit(s)/Min (2.4 mL/Hr) IV Continuous <Continuous>    ========================INFECTIOUS DISEASE================  cefepime   IVPB 2000 milliGRAM(s) IV Intermittent every 12 hours  vancomycin  IVPB      vancomycin  IVPB 1000 milliGRAM(s) IV Intermittent every 24 hours      ========================PROPHYLACTIC MEASURE================  DVT  GI Protonix  Graft patency   Beta blocker      Patient requires continuous monitoring with bedside rhythm monitoring, arterial line, pulse oximetry, ventilator monitoring and intermittent blood gas analysis.  Care plan discussed with ICU care team.  Patient remained critical; required more than usual post op care; I have spent 35 minutes providing non-routine post op care, revaluated multiple times during the day.         ALAN DE LA ROSA  MRN-[id number]  Patient is a 71y old  Male who presents with a chief complaint of Cardiogenic Shock (12 May 2022 01:30)    HPI:   History obtained from wife, daughter and charts.     Pt is a 71M with PMHx DMII, CHF (30%), AICD 1yr ago, HLD. He is a former smoker (quit approx 30 years ago). Pt received his second COVID 19 booster shot this week.     Per daughter, pt began having some new lower extremity edema last week and later developed a night time cough. His symptoms improved after a few days until today when his cough worsened and he became extremely tachypneic and short of breath. Daughter became very concerned and brought him to ED.     Surgery/Hospital course:  In the ED, pt was tachypneic, with systolic BP in the 70s maxed on levo. He was also on Bipap with RR 40s, TV 200s and not mentating well. Lactate was reportedly 11 and pH was 7. In the ED, he was started on milrinone and dobutamine and then shock team was activated. The patient was taken to cath lab and an Impella was placed.     Upon arrival to CICU, pt was on Dobutamine @10, Milrinone @.125, Vaso 0.08, Levo @ 1.0, intubated on PEEP 8 and fio2 100%   (11 May 2022 22:58)      Overnight events: pressors/inotropes de-escalated     REVIEW OF SYSTEMS: deferred, intubated and sedated     Vital Signs Last 24 Hrs  T(C): 38.4 (12 May 2022 06:45), Max: 38.4 (12 May 2022 05:30)  T(F): 101.1 (12 May 2022 06:45), Max: 101.1 (12 May 2022 05:30)  HR: 73 (12 May 2022 08:15) (64 - 142)  BP: 98/71 (12 May 2022 07:45) (70/46 - 141/78)  BP(mean): 80 (12 May 2022 07:45) (61 - 80)  RR: 16 (12 May 2022 08:15) (16 - 44)  SpO2: 94% (12 May 2022 08:15) (85% - 100%)    Physical Exam:   Constitutional: no active distress   HEENT: PERRLA, EOMI, no drainage or redness  Neck: supple,  No JVD  Respiratory: Breath Sounds equal & clear bilaterally to auscultation, good air entry   Cardiovascular: aflutter, s1s2  Gastrointestinal: Soft, non-tender, non distended, + bowel sounds  Extremities: GUZMÁN x 4, no peripheral edema, no cyanosis, no clubbing   Neurological: sedated   Skin: warm, dry, well perfused  Incisions:    ============================I/O===========================   I&O's Detail    11 May 2022 07:  -  12 May 2022 07:00  --------------------------------------------------------  IN:    Bumetanide: 5 mL    DOBUTamine: 22 mL    DOBUTamine: 132 mL    DOBUTamine: 11 mL    IV PiggyBack: 900 mL    IV PiggyBack: 116.9 mL    IV PiggyBack: 50 mL    Midazolam: 51.8 mL    Norepinephrine: 121.1 mL    Norepinephrine: 275.6 mL    Propofol: 8.8 mL    Vasopressin: 6 mL    Vasopressin: 36.6 mL  Total IN: 1736.8 mL    OUT:    Voided (mL): 1180 mL  Total OUT: 1180 mL    Total NET: 556.8 mL      12 May 2022 07:01  -  12 May 2022 08:40  --------------------------------------------------------  IN:    DOBUTamine: 11 mL    Vasopressin: 6 mL  Total IN: 17 mL    OUT:    Propofol: 0 mL    Voided (mL): 75 mL  Total OUT: 75 mL    Total NET: -58 mL        ============================ LABS =========================                        13.0   14.93 )-----------( 33       ( 12 May 2022 04:39 )             40.4     05-12    147<H>  |  100  |  41<H>  ----------------------------<  159<H>  4.3   |  16<L>  |  2.44<H>    Ca    9.3      12 May 2022 04:39  Phos  5.6     05  Mg     2.0         TPro  4.6<L>  /  Alb  2.6<L>  /  TBili  3.5<H>  /  DBili  x   /  AST  7104<H>  /  ALT  5611<H>  /  AlkPhos  97  12    LIVER FUNCTIONS - ( 12 May 2022 04:39 )  Alb: 2.6 g/dL / Pro: 4.6 g/dL / ALK PHOS: 97 U/L / ALT: 5611 U/L / AST: 7104 U/L / GGT: x           PT/INR - ( 12 May 2022 04:39 )   PT: 37.5 sec;   INR: 3.20 ratio         PTT - ( 12 May 2022 04:39 )  PTT:70.3 sec  ABG - ( 12 May 2022 04:23 )  pH, Arterial: 7.42  pH, Blood: x     /  pCO2: 30    /  pO2: 129   / HCO3: 20    / Base Excess: -3.9  /  SaO2: 98.6              Urinalysis Basic - ( 11 May 2022 22:13 )    Color: Yellow / Appearance: Clear / S.021 / pH: x  Gluc: x / Ketone: Trace  / Bili: Negative / Urobili: <2 mg/dL   Blood: x / Protein: 30 mg/dL / Nitrite: Negative   Leuk Esterase: Negative / RBC: 141 /HPF / WBC 2 /HPF   Sq Epi: x / Non Sq Epi: 0 /HPF / Bacteria: Negative      ======================Micro/Rad/Cardio=================  Culture: Reviewed   CXR: Reviewed  Echo:Reviewed  ======================================================  PAST MEDICAL & SURGICAL HISTORY:  Hypertension      Diabetes  A1C 6.8  on admission      Former smoker      HLD (hyperlipidemia)      CAD (coronary artery disease)  reports angiogram - 1 year ago - pt reports non obstructive  at [facility]      CHF (congestive heart failure)  (EF 30%)      H/O fracture of wrist  and left ankle (ORIF ankle) following fall        ====================ASSESSMENT ==============  Pt is a 71M with PMHx DMII, CHF (30%), AICD 1yr ago, HLD. He is a former smoker (quit approx 30 years ago). Pt received his second COVID 19 booster shot this week. Per daughter, pt began having some new lower extremity edema last week and later developed a night time cough. His symptoms improved after a few days until today when his cough worsened and he became extremely tachypneic and short of breath. Daughter became very concerned and brought him to ED.   In the ED, pt was tachypneic, with systolic BP in the 70s maxed on levo. He was also on Bipap with RR 40s, TV 200s and not mentating well. Lactate was reportedly 11 and pH was 7. In the ED, he was started on milrinone and dobutamine and then shock team was activated. The patient was taken to cath lab and an Impella was placed.       ====================== NEUROLOGY=====================  --continue propofol for sedation, RASS goal -1  --daily sedation vacation   propofol Infusion 30 MICROgram(s)/kG/Min (13.2 mL/Hr) IV Continuous <Continuous>    ==================== RESPIRATORY======================  --continue vent support, defer SBT today  --vent bundle   Mechanical Ventilation:  Mode: AC/ CMV (Assist Control/ Continuous Mandatory Ventilation)  RR (machine): 16  TV (machine): 500  FiO2: 30  PEEP: 5  ITime: 1  MAP: 13  PIP: 26      ====================CARDIOVASCULAR==================  --Continue dobutamine for inotropic support  --wean vasopressors for MAP > 65  --stop digoxin in the setting of bradycardia and renal failure   --Wean Impella as tolerated, consider removing if tolerates wean given hemolysis   --consult EP, patient with a flutter, recommend beta blocker when off pressors   --CVP goal 8-10  --continue to trend cardiac indices and end organ perfusion   --holding statin given transaminitis   --eventual ischemic w/up?  --heart failure following, appreciate recs     digoxin  Injectable 250 MICROGram(s) IV Push every 6 hours  DOBUTamine Infusion 5 MICROgram(s)/kG/Min (11 mL/Hr) IV Continuous <Continuous>    ===================HEMATOLOGIC/ONC ===================  --heme consult placed for thrombocytopenia, likely 2/2 impella  --s/p cryo, ffp and platelets for line placements   --peripheral smear reviewed, unremarkable   --LDH downtrending, continue to monitor     aspirin enteric coated 81 milliGRAM(s) Oral daily  heparin 50 Units/mL (IMPELLA VAD) Purge Solution  Unit(s) (15.8 mL/Hr) Ventricular Assist Device <Continuous>    ===================== RENAL =========================  --ALISSA on CKD, unclear baseline Cr  --Would favor keeping slightly positive today given mixed shock picture  --Hemolysis likely contributing to renal injury  --will continue to monitor renal function closely, avoid nephrotoxic medications    ==================== GASTROINTESTINAL===================  --NPO for now  --consider starting trickle feeds tonight     pantoprazole  Injectable 40 milliGRAM(s) IV Push two times a day    =======================    ENDOCRINE  =====================  dextrose 50% Injectable 25 Gram(s) IV Push once  dextrose 50% Injectable 12.5 Gram(s) IV Push once  dextrose 50% Injectable 25 Gram(s) IV Push once  dextrose Oral Gel 15 Gram(s) Oral once  glucagon  Injectable 1 milliGRAM(s) IntraMuscular once  insulin lispro (ADMELOG) corrective regimen sliding scale   SubCutaneous three times a day before meals  vasopressin Infusion 0.04 Unit(s)/Min (2.4 mL/Hr) IV Continuous <Continuous>    ========================INFECTIOUS DISEASE================  --continue empiric antibiotics, de-escalate if appropriate   --f/u infectious w/up  --+ human metapneumovirus  --f/u CDiff    cefepime   IVPB 2000 milliGRAM(s) IV Intermittent every 12 hours  vancomycin  IVPB      vancomycin  IVPB 1000 milliGRAM(s) IV Intermittent every 24 hours      ========================PROPHYLACTIC MEASURE================  Lines: RIJ TLC (-)  LRAL (-)  Adamson catheter (-)      DVT  GI Protonix  Graft patency   Beta blocker      Patient requires continuous monitoring with bedside rhythm monitoring, arterial line, pulse oximetry, ventilator monitoring and intermittent blood gas analysis.  Care plan discussed with ICU care team.  Patient remained critical; required more than usual post op care; I have spent 35 minutes providing non-routine post op care, revaluated multiple times during the day.

## 2022-05-12 NOTE — CONSULT NOTE ADULT - ASSESSMENT
72 yo M w/ PMH HFrEF, ICD, CAD, T2DM who presents with SOB found to be in cardiogenic shock now w/ impella placement.     #Cardiogenic Shock   #HFrEF   #Septic shock?   Patient found to be metapneumovirus positive, shock likely mixed with both sepsis and cardiogenic. Now w/ impella placement. Currently on levo and vaso. Milrinone was able to weaned off overnight. Currently weaning down dobutamine. Montezuma in place. Echo showed biventricular failure. RHC showed elevated filling pressures   -Diuresis with bumex gtt, aim for 1-2 net negative   -Strict I/O   -Wean dobutamine   -F/u cultures   -Agree with empiric abx   -If patient's output decreases or patient acidotic, may need CVVH

## 2022-05-13 NOTE — DIETITIAN INITIAL EVALUATION ADULT - NSFNSGIIOFT_GEN_A_CORE
05-12-22 @ 07:01  -  05-13-22 @ 07:00  --------------------------------------------------------  OUT:    Rectal Tube (mL): 1400 mL  Total OUT: 1400 mL    Total NET: -1400 mL      05-13-22 @ 07:01  -  05-13-22 @ 09:47  --------------------------------------------------------  OUT:    Rectal Tube (mL): 100 mL  Total OUT: 100 mL    Total NET: -100 mL

## 2022-05-13 NOTE — PROGRESS NOTE ADULT - CRITICAL CARE ATTENDING COMMENT
Patient seen and examined. Agree with assessment and plan as outlined above.  71 year-old man with ischemic cardiomyopathy admitted with mixed cardiogenic/distributive shock status-post impella placement now with impella weaned off and removed. Patient remains sedated on the ventilator on propofol and precedex. Patient on dobutamine @ 3. Rising LFTs secondary to shock liver. Worsening renal function. Appreciate heart failure follow-up. Nephrology to evaluate. On TFs. Atrial flutter vs. atrial tachycardia seen on telemetry. Patient discussed with EP. Could consider eventual ANDRÉS/cardioversion if improves. Start full-dose heparin gtt if platelets remain stable. On SQ heparin for DVT prophylaxis. Continue vanco/cefepime. Patient DNR/DNI per family request. Will ask palliative to evaluate.

## 2022-05-13 NOTE — PROGRESS NOTE ADULT - ASSESSMENT
Mr. Love is a 72 y/o M former smoker with PMH of DM2, HLD, CAD, HFrEF (EF 30%) s/p AICD, here with mixed cardiogenic/distributive shock, ALISSA and respiratory failure requiring intubation in the setting of metapneumovirus. Impella was placed, however complicated by hemolysis and rising LDH. He tolerated impella weaning and it was subsequently removed with adequate support on dobutamine. He has persisting ALISSA with poor urine output. Despite adequate cardiac output on dobutamine his LFTs continue to rise, perhaps 2/2 ischemic injury. Mildly volume overloaded with uptrending CVP this morning with worsening Cr. Overall critically ill with guarded prognosis.  Mr. Love is a 70 y/o M former smoker with PMH of DM2, HLD, CAD, HFrEF (EF 30%) s/p AICD, here with mixed cardiogenic/distributive shock, ALISSA and respiratory failure requiring intubation in the setting of metapneumovirus. Impella was placed, however complicated by hemolysis and rising LDH. He tolerated impella weaning and it was subsequently removed with adequate support on dobutamine. He has persisting ALISSA with poor urine output. Despite adequate cardiac output on dobutamine his LFTs continue to rise, perhaps 2/2 ischemic injury. Mildly volume overloaded with uptrending CVP this morning with worsening Cr. Persisting elevated lactate without acidosis, although has downtrended. Overall critically ill with guarded prognosis.     Hemodynamics:  : (vaso 0.1, levo 0.03,  3) CVP 11, PA 43/20/27, mVO2 73%, lact 6.5

## 2022-05-13 NOTE — CONSULT NOTE ADULT - SUBJECTIVE AND OBJECTIVE BOX
[facility] DIVISION OF KIDNEY DISEASES AND HYPERTENSION -- [phone]  -- INITIAL CONSULT NOTE  --------------------------------------------------------------------------------  History obtained from chart review and pts. family present bedside.     HPI: Patient is a 71 year old Male with PMHx DM, CHF (30%), AICD 1yr ago, HLD. who was admitted at [facility] on 22 with complaints of SOB, LLE swelling. In ER, pt. was tachypneic with systolic BP in 70s mmHg. Pt. was admitted for cardiogenic shock. Impella was placed on  and removed on . Over the course of hospital stay, Scr increased from 1.8 on admission to 4.13 today. Nephrology team was consulted for ALISSA management.     On review of [facility], it was noted that Scr was 1.80 on  on admission. Scr increased to 4.13 today. Pt.s daughter denied Hx of kidney disease in the past. Pt. is currently intubated and on [facility] vent. Also receiving IV vasopressors.     PAST HISTORY  --------------------------------------------------------------------------------  PAST MEDICAL & SURGICAL HISTORY:  Hypertension      Diabetes  A1C 6.8  on admission      Former smoker      HLD (hyperlipidemia)      CAD (coronary artery disease)  reports angiogram - 1 year ago - pt reports non obstructive  at [facility]      CHF (congestive heart failure)  (EF 30%)      H/O fracture of wrist  and left ankle (ORIF ankle) following fall        FAMILY HISTORY:  Family history of stroke  father  59 yr    Family history of diabetes mellitus  brother  complications of DM age 50 yr    Family history of heart attack  mother  57 yr      PAST SOCIAL HISTORY:    ALLERGIES & MEDICATIONS  --------------------------------------------------------------------------------  Allergies    No Known Allergies    Intolerances      Standing Inpatient Medications  aspirin  chewable 81 milliGRAM(s) Enteral Tube daily  cefepime   IVPB 2000 milliGRAM(s) IV Intermittent every 12 hours  chlorhexidine 0.12% Liquid 15 milliLiter(s) Oral Mucosa every 12 hours  chlorhexidine 4% Liquid 1 Application(s) Topical <User Schedule>  dexMEDEtomidine Infusion 0.2 MICROgram(s)/kG/Hr IV Continuous <Continuous>  dextrose 50% Injectable 25 Gram(s) IV Push once  dextrose 50% Injectable 12.5 Gram(s) IV Push once  dextrose 50% Injectable 25 Gram(s) IV Push once  dextrose Oral Gel 15 Gram(s) Oral once  DOBUTamine Infusion 3 MICROgram(s)/kG/Min IV Continuous <Continuous>  glucagon  Injectable 1 milliGRAM(s) IntraMuscular once  insulin lispro (ADMELOG) corrective regimen sliding scale   SubCutaneous three times a day before meals  norepinephrine Infusion 0.05 MICROgram(s)/kG/Min IV Continuous <Continuous>  pantoprazole  Injectable 40 milliGRAM(s) IV Push two times a day  vancomycin  IVPB      vancomycin  IVPB 1000 milliGRAM(s) IV Intermittent every 24 hours  vasopressin Infusion 0.04 Unit(s)/Min IV Continuous <Continuous>    PRN Inpatient Medications      REVIEW OF SYSTEMS  --------------------------------------------------------------------------------  Unable to obtain ROS     VITALS/PHYSICAL EXAM  --------------------------------------------------------------------------------  T(C): 36.5 (22 @ 11:00), Max: 37.1 (22 @ 03:00)  HR: 76 (22 13:45) (66 - 83)  BP: --  RR: 19 (22 13:45) (11 - 34)  SpO2: 99% (22:45) (68% - 100%)  Wt(kg): --  Height (cm): 167.6 (22 17:11)  Weight (kg): 73.5 (22 17:11)  BMI (kg/m2): 26.2 (22:)  BSA (m2): 1.83 (22 17:11)    22 @ 07:01  -  22 @ 07:00  --------------------------------------------------------  IN: 3243.4 mL / OUT: 2105 mL / NET: 1138.4 mL    22 @ 07:01  -  22 @ 14:31  --------------------------------------------------------  IN: 222.7 mL / OUT: 370 mL / NET: -147.3 mL    Physical Exam:  	Gen: intubated  	HEENT: MMM  	Pulm: CTA B/L  	CV: S1S2  	Abd: Soft, +BS   	Ext: No LE edema B/L  	Neuro: Sedated  	Skin: Warm and dry    LABS/STUDIES  --------------------------------------------------------------------------------              10.2   7.80  >-----------<  42       [22 13:20]              33.2     144  |  99  |  57  ----------------------------<  187      [22 00:56]  3.8   |  21  |  4.13        Ca     8.2     [22:56]      Mg     2.1     [22 00:56]      Phos  7.0     [22 00:56]    TPro  4.3  /  Alb  2.7  /  TBili  6.6  /  DBili  x   /  AST  63866  /  ALT  7381  /  AlkPhos  115  [22 00:56]    PT/INR: PT 35.7 , INR 3.07       [22 00:56]  PTT: 48.9       [22 00:56]          [22 14:38]  LDH 75446      [22 14:38]    Creatinine Trend:  SCr 4.13 [:56]  SCr 3.31 [ 14:38]  SCr 2.94 [ 08:59]  SCr 2.44 [ 04:39]  SCr 2.09 [ 01:06]    Urinalysis - [22 22:13]      Color Yellow / Appearance Clear / SG 1.021 / pH 6.0      Gluc >= 1000 mg/dL / Ketone Trace  / Bili Negative / Urobili <2 mg/dL       Blood Large / Protein 30 mg/dL / Leuk Est Negative / Nitrite Negative       / WBC 2 / Hyaline 3 / Gran  / Sq Epi  / Non Sq Epi 0 / Bacteria Negative    TSH 4.14      [22 22:39]  Lipid: chol 118, TG 77, HDL 19, LDL --      [22 22:39]    HCV 0.07, Nonreact      [22 01:06]

## 2022-05-13 NOTE — CONSULT NOTE ADULT - PROBLEM SELECTOR RECOMMENDATION 6
Will continue to follow for ongoing goals of care. Case discussed with primary team ACP.    For acute issues or uncontrolled symptoms please page palliative team.    Belkis Doherty MD  Geriatrics and Palliative Medicine Attending  [facility] pager: [phone]     The Geriatrics and Palliative Medicine consult service has 24/7 coverage for medical recommendations, including symptom management needs.

## 2022-05-13 NOTE — DIETITIAN INITIAL EVALUATION ADULT - PERTINENT LABORATORY DATA
05-13    144  |  99  |  57<H>  ----------------------------<  187<H>  3.8   |  21<L>  |  4.13<H>    Ca    8.2<L>      13 May 2022 00:56  Phos  7.0     05-13  Mg     2.1     05-13    TPro  4.3<L>  /  Alb  2.7<L>  /  TBili  6.6<H>  /  DBili  x   /  AST  32760<H>  /  ALT  7381<H>  /  AlkPhos  115  05-13  POCT Blood Glucose.: 135 mg/dL (05-13-22 @ 07:51)  A1C with Estimated Average Glucose Result: 7.7 % (05-11-22 @ 22:39)

## 2022-05-13 NOTE — DIETITIAN INITIAL EVALUATION ADULT - ENTER FROM (CAL/KG)
Podiatry - Clinic Note  Jennifer Boothe : 1980 Sex: female MRN: [id number] RUQ abdominal pain    • S/P cholecystectomy done in 2014 3/6/2017   • Situational mixed anxiety and depressive disorder 2004    related to her mother's death   • SVT (supraventricular tachycardia) (CMS/HCC) 2010    on heart monitor   • Systolic murmur 2016       Medications:   Current Outpatient Prescriptions   Medication Sig Dispense Refill   • meloxicam (MOBIC) 15 MG tablet Take 1 tablet by mouth daily. 30 tablet 0   • citalopram (CELEXA) 40 MG tablet Take 1 tablet by mouth daily. 90 tablet 0   • levonorgestrel-ethinyl estradiol (NORDETTE) 0.15-30 MG-MCG per tablet Take 1 tablet by mouth daily. 84 tablet 4   • fluocin-hydroquinone-tretinoin (TRI-JENISE) 0.01-4-0.05 % cream Apply topically nightly. 30 g 0   • hyoscyamine (ANASPAZ,LEVSIN) 0.125 MG tablet 1 tablet every 8 hrs PRN pain 100 tablet 1   • sucralfate (CARAFATE) 1 G tablet Take 1 tablet by mouth 4 times daily. 120 tablet 11   • omeprazole (PRILOSEC) 40 MG capsule Take 1 capsule by mouth daily. Take 1/2 hour before first meal of day. 30 capsule 5   • Cholecalciferol (VITAMIN D3) 2000 UNITS Tab Take 2 tablets by mouth daily. (4,000 iu daily)       No current facility-administered medications for this visit.        Family Hx:   Family History   Problem Relation Age of Onset   • Heart disease Mother      Idiopathic hypertrophic subaortic stenosis   • * Mother      CREST syndrome   • GI Mother      diverticulosis, chronic diarrhea    • Hypertension Mother    • Stroke Father      TIA in his 40s with no residual effect   • Hypertension Father    • Heart disease Maternal Grandmother    • Diabetes Maternal Grandmother    • Thyroid Maternal Grandmother    • Hypertension Maternal Grandmother    • High cholesterol Maternal Grandmother    • Obesity Maternal Grandmother    • * Maternal Grandmother      blood clotting disorder, on Coumadin   • Cancer Paternal Grandmother       in her 50s of pancreatic cancer   • Cancer Paternal Grandfather       in  his 50s from leukemia   • Hypertension Maternal Grandfather    • Cancer Maternal Grandfather      colon cancer   • Heart disease Other      idiopathic hypertropic subaortic stenosis   • Crohn's Disease Other        Social Hx:   Social History     Social History   • Marital status:      Spouse name: John   • Number of children: 1   • Years of education: N/A     Occupational History   • [employer]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 Alex, normal   • ERCP  5/10/2012   • ESOPHAGOGASTRODUODENOSCOPY TRANSORAL FLEX W/BX SINGLE OR MULT  12/10/2008    Dr. Alex, \"moderate gastritis with 3 antral ulcers.\"   • LAPAROSCOPIC CHOLECYSTECTOMY  8/4/2014   • WISDOM TOOTH EXTRACTION      age 18, 4 wisdom teeth extracted         PHYSICAL EXAMINATIONS:  Vital Signs:  Visit Vitals  /64   Pulse 68   Ht 4' 11\" (1.499 m)   Wt 61.7 kg   LMP 10/18/2017   BMI 27.47 kg/m²     Body mass index is 27.47 kg/m².    General: No apparent distress, Alert and oriented    Vascular: Pulses: Posterior tibial 3/4 and dorsalis pedis 3/4 bilateral.  Hair growth Present at the level of the digits  Capillary filling time less than three seconds bilateral digits 1-5.  Lower extremity/foot edema Absent bilaterally.    Neurological: Protective sensation intact to light touch bilaterally.  Strength and tone normal lower extremity muscles rated at 5/5 in all quadrants in lower leg and foot  Negative Tinel's test with percussion tarsal tunnel.    Dermatologic: Skin warm, dry and supple, without open  Edson Darby DPM         20

## 2022-05-13 NOTE — PROGRESS NOTE ADULT - SUBJECTIVE AND OBJECTIVE BOX
CICU DAY NOTE  Admission date: 22  Chief complaint/ Diagnosis  HPI: 70 yo M w/ PMH HFrEF, ICD, CAD, T2DM who presents with SOB. Per patient, he has been feeling SOB for several days. Also endorsed cough as well. He also reported orthopnea but denied LE edema or weight gain. Says he has been taking his meds daily including his lasix. His SOB has gotten worse today so his daughter brought him. In the ED, he was notably tachypenic and his BP was 60/30. Was started on levophed with max dose and also BiPAP. He was then started on dobutamine, milrionine, and also vasopressin. He was given lasix 2x in the ED as well as 2 amps of bicarb. Echo showed biventricular failure. RHC showed elevated filling pressures    He was ultimately intubated and sent to the cath lab for impella placement.     Interval history: intubated and sedated  s/p albumin x 4 s/p impella removal   Received the patient on Prop@10 precedex@0.4 Vaso@0.1 @3 and Hep gtt  Worsening Cr and LFT   Mixed 73.1 CO/CI 7.3/3.4  CVP PA Wedge     MEDICATIONS  (STANDING):  aspirin  chewable 81 milliGRAM(s) Enteral Tube daily  cefepime   IVPB 2000 milliGRAM(s) IV Intermittent every 12 hours  chlorhexidine 0.12% Liquid 15 milliLiter(s) Oral Mucosa every 12 hours  chlorhexidine 4% Liquid 1 Application(s) Topical <User Schedule>  dexMEDEtomidine Infusion 0.2 MICROgram(s)/kG/Hr (3.68 mL/Hr) IV Continuous <Continuous>  DOBUTamine Infusion 3 MICROgram(s)/kG/Min (6.62 mL/Hr) IV Continuous <Continuous>  glucagon  Injectable 1 milliGRAM(s) IntraMuscular once  insulin lispro (ADMELOG) corrective regimen sliding scale   SubCutaneous three times a day before meals  pantoprazole  Injectable 40 milliGRAM(s) IV Push two times a day  propofol Infusion 30 MICROgram(s)/kG/Min (13.2 mL/Hr) IV Continuous <Continuous>  vancomycin  IVPB 1000 milliGRAM(s) IV Intermittent every 24 hours  vasopressin Infusion 0.04 Unit(s)/Min (2.4 mL/Hr) IV Continuous <Continuous>    PAST MEDICAL & SURGICAL HISTORY:  Hypertension  Diabetes A1C 6.8  on admission  Former smoker  HLD (hyperlipidemia)  CAD (coronary artery disease) reports angiogram - 1 year ago - pt reports non obstructive  at [facility]  CHF (congestive heart failure) (EF 30%)  H/O fracture of wrist and left ankle (ORIF ankle) following fall    FAMILY HISTORY:  Family history of stroke  father  59 yr  Family history of diabetes mellitus  brother  complications of DM age 50 yr  Family history of heart attack  mother  57 yr    Allergy   No Known Allergies    ICU Vital Signs Last 24 Hrs  T(C): 36.8 (Max: 37.1)  HR: 79(64 - 83)  ABP: 104/40  (83/50 - 144/46)  ABP(mean): 58 (48 - 116)  RR: 16  (11 - 33)  SpO2: 100% (68% - 100%)    Mode: AC/ CMV (Assist Control/ Continuous Mandatory Ventilation) RR (machine): 16 TV (machine): 450,FiO2: 30 PEEP: 5    ABG - ( 13 May 2022 00:55 )  pH, Arterial: 7.44  /  pCO2: 34    /  pO2: 99    / HCO3: 23    / Base Excess: -0.6  /  SaO2: 98.2      I&O's Summary  IN: 3243.4 mL / OUT: 1675 mL / NET: 1568.4 mL    PHYSICAL EXAM  Appearance: Normal, NAD  HEAD:  Normocephalic  EYES: PERRLA, conjunctiva and sclera clear  NECK: Supple, No JVD  CHEST/LUNG: Clear to auscultation bilaterally; No wheezing  HEART: Normal S1, S2. No murmurs, rubs, or gallops  ABDOMEN: + Bowel sounds, Soft, NT, ND   EXTREMITIES:  2+ Peripheral Pulses, No clubbing, cyanosis, or edema  NEUROLOGY: intubated and sedated   Lymphatic: No lymphadenopathy  SKIN: No rashes or lesions    Interpretation of Telemetry:  CXR: Bilateral pulm edema, [place] in place                         11.0   11.54 )-----------( 51                 34.6       144  |  99  |  57<H>  ----------------------------<  187<H>  3.8   |  21<L>  |  4.13<H>    Ca    8.2<L>Phos  7.0     Mg     2.1   TPro  4.3<L>  /  Alb  2.7<L>  /  TBili  6.6<H>  /  DBili  x   /  AST  23420<H>  /  ALT  7381<H>  /  AlkPhos  115   F/S 156-198    CARDIAC MARKERS ( 12 May 2022 14:38 )  x     / x     / 590 U/L / x     / x      CARDIAC MARKERS ( 12 May 2022 04:39 )  x     / x     / 595 U/L / x     / 7.2 ng/mL  CARDIAC MARKERS ( 11 May 2022 22:39 )  x     / x     / 404 U/L / x     / 4.4 ng/mL         CICU DAY NOTE  Admission date: 22  Chief complaint/ Diagnosis  HPI: 72 yo M w/ PMH HFrEF, ICD, CAD, T2DM who presents with SOB. Per patient, he has been feeling SOB for several days. Also endorsed cough as well. He also reported orthopnea but denied LE edema or weight gain. Says he has been taking his meds daily including his lasix. His SOB has gotten worse today so his daughter brought him. In the ED, he was notably tachypenic and his BP was 60/30. Was started on levophed with max dose and also BiPAP. He was then started on dobutamine, milrionine, and also vasopressin. He was given lasix 2x in the ED as well as 2 amps of bicarb. Echo showed biventricular failure. RHC showed elevated filling pressures    He was ultimately intubated and sent to the cath lab for impella placement.     Interval history: intubated and sedated  s/p albumin x 4 s/p impella removal   Received the patient on Prop@10 precedex@0.4 Vaso@0.1 @3 and Hep gtt  Worsening Cr and LFT   Mixed 73.1 CO/CI 7.3/3.4  CVP    MEDICATIONS  (STANDING):  aspirin  chewable 81 milliGRAM(s) Enteral Tube daily  cefepime   IVPB 2000 milliGRAM(s) IV Intermittent every 12 hours  chlorhexidine 0.12% Liquid 15 milliLiter(s) Oral Mucosa every 12 hours  chlorhexidine 4% Liquid 1 Application(s) Topical <User Schedule>  dexMEDEtomidine Infusion 0.2 MICROgram(s)/kG/Hr (3.68 mL/Hr) IV Continuous <Continuous>  DOBUTamine Infusion 3 MICROgram(s)/kG/Min (6.62 mL/Hr) IV Continuous <Continuous>  glucagon  Injectable 1 milliGRAM(s) IntraMuscular once  insulin lispro (ADMELOG) corrective regimen sliding scale   SubCutaneous three times a day before meals  pantoprazole  Injectable 40 milliGRAM(s) IV Push two times a day  propofol Infusion 30 MICROgram(s)/kG/Min (13.2 mL/Hr) IV Continuous <Continuous>  vancomycin  IVPB 1000 milliGRAM(s) IV Intermittent every 24 hours  vasopressin Infusion 0.04 Unit(s)/Min (2.4 mL/Hr) IV Continuous <Continuous>    PAST MEDICAL & SURGICAL HISTORY:  Hypertension  Diabetes A1C 6.8  on admission  Former smoker  HLD (hyperlipidemia)  CAD (coronary artery disease) reports angiogram - 1 year ago - pt reports non obstructive  at [facility]  CHF (congestive heart failure) (EF 30%)  H/O fracture of wrist and left ankle (ORIF ankle) following fall    FAMILY HISTORY:  Family history of stroke  father  59 yr  Family history of diabetes mellitus  brother  complications of DM age 50 yr  Family history of heart attack  mother  57 yr    Allergy   No Known Allergies    ICU Vital Signs Last 24 Hrs  T(C): 36.8 (Max: 37.1)  HR: 79(64 - 83)  ABP: 104/40  (83/50 - 144/46)  ABP(mean): 58 (48 - 116)  RR: 16  (11 - 33)  SpO2: 100% (68% - 100%)    Mode: AC/ CMV (Assist Control/ Continuous Mandatory Ventilation) RR (machine): 16 TV (machine): 450,FiO2: 30 PEEP: 5    ABG - ( 13 May 2022 00:55 )  pH, Arterial: 7.44  /  pCO2: 34    /  pO2: 99    / HCO3: 23    / Base Excess: -0.6  /  SaO2: 98.2      I&O's Summary  IN: 3243.4 mL / OUT: 1675 mL / NET: 1568.4 mL    PHYSICAL EXAM  Appearance: Normal, NAD  HEAD:  Normocephalic  EYES: PERRLA, conjunctiva and sclera clear  NECK: Supple, No JVD  CHEST/LUNG: Clear to auscultation bilaterally; No wheezing  HEART: Normal S1, S2. No murmurs, rubs, or gallops  ABDOMEN: + Bowel sounds, Soft, NT, ND   EXTREMITIES:  2+ Peripheral Pulses, No clubbing, cyanosis, or edema  NEUROLOGY: intubated and sedated   Lymphatic: No lymphadenopathy  SKIN: No rashes or lesions    Interpretation of Telemetry:  CXR: Bilateral pulm edema, [place] in place                         11.0   11.54 )-----------( 51                 34.6       144  |  99  |  57<H>  ----------------------------<  187<H>  3.8   |  21<L>  |  4.13<H>    Ca    8.2<L>Phos  7.0     Mg     2.1   TPro  4.3<L>  /  Alb  2.7<L>  /  TBili  6.6<H>  /  DBili  x   /  AST  28103<H>  /  ALT  7381<H>  /  AlkPhos  115   F/S 156-198    CARDIAC MARKERS ( 12 May 2022 14:38 )  x     / x     / 590 U/L / x     / x      CARDIAC MARKERS ( 12 May 2022 04:39 )  x     / x     / 595 U/L / x     / 7.2 ng/mL  CARDIAC MARKERS ( 11 May 2022 22:39 )  x     / x     / 404 U/L / x     / 4.4 ng/mL

## 2022-05-13 NOTE — PROGRESS NOTE ADULT - SUBJECTIVE AND OBJECTIVE BOX
Patient is a 71y old  Male who presents with a chief complaint of Pt is a 70 y/o M w/ CAD,CHF(EF 30%) s/p AICD, dm2 admitted w/ SOB and malaise hypotensive w/ elevated LA in setting of cardiogenic and distributive shock      (13 May 2022 09:47)      SUBJECTIVE / OVERNIGHT EVENTS:    Patient seen and examined. intubated.       Vital Signs Last 24 Hrs  T(C): 36.5 (13 May 2022 11:00), Max: 37.1 (13 May 2022 03:00)  T(F): 97.7 (13 May 2022 11:00), Max: 98.8 (13 May 2022 03:00)  HR: 76 (13 May 2022 12:30) (64 - 83)  BP: --  BP(mean): --  RR: 16 (13 May 2022 12:30) (11 - 34)  SpO2: 100% (13 May 2022 12:30) (68% - 100%)  I&O's Summary    12 May 2022 07:01  -  13 May 2022 07:00  --------------------------------------------------------  IN: 3243.4 mL / OUT: 2105 mL / NET: 1138.4 mL    13 May 2022 07:01  -  13 May 2022 13:25  --------------------------------------------------------  IN: 222.7 mL / OUT: 370 mL / NET: -147.3 mL        PE:  GENERAL: sedated intubated  NECK: right neck CVC  CHEST/LUNG: decreased BS  HEART: Regular rate and rhythm; No murmur  ABDOMEN: Soft, Nontender, Nondistended; Bowel sounds present, rectal tube with stool  NEURO: sedated    LABS:                        11.0   11.54 )-----------( 51       ( 13 May 2022 00:57 )             34.6     05    144  |  99  |  57<H>  ----------------------------<  187<H>  3.8   |  21<L>  |  4.13<H>    Ca    8.2<L>      13 May 2022 00:56  Phos  7.0       Mg     2.1         TPro  4.3<L>  /  Alb  2.7<L>  /  TBili  6.6<H>  /  DBili  x   /  AST  80143<H>  /  ALT  7381<H>  /  AlkPhos  115  13    PT/INR - ( 13 May 2022 00:56 )   PT: 35.7 sec;   INR: 3.07 ratio         PTT - ( 13 May 2022 00:56 )  PTT:48.9 sec  CAPILLARY BLOOD GLUCOSE      POCT Blood Glucose.: 119 mg/dL (13 May 2022 11:44)  POCT Blood Glucose.: 135 mg/dL (13 May 2022 07:51)  POCT Blood Glucose.: 198 mg/dL (12 May 2022 16:59)    CARDIAC MARKERS ( 12 May 2022 14:38 )  x     / x     / 590 U/L / x     / x      CARDIAC MARKERS ( 12 May 2022 04:39 )  x     / x     / 595 U/L / x     / 7.2 ng/mL  CARDIAC MARKERS ( 11 May 2022 22:39 )  x     / x     / 404 U/L / x     / 4.4 ng/mL      Urinalysis Basic - ( 11 May 2022 22:13 )    Color: Yellow / Appearance: Clear / S.021 / pH: x  Gluc: x / Ketone: Trace  / Bili: Negative / Urobili: <2 mg/dL   Blood: x / Protein: 30 mg/dL / Nitrite: Negative   Leuk Esterase: Negative / RBC: 141 /HPF / WBC 2 /HPF   Sq Epi: x / Non Sq Epi: 0 /HPF / Bacteria: Negative        RADIOLOGY & ADDITIONAL TESTS:    Imaging Personally Reviewed:  [x] YES  [ ] NO    Consultant(s) Notes Reviewed:  [x] YES  [ ] NO    MEDICATIONS  (STANDING):  aspirin  chewable 81 milliGRAM(s) Enteral Tube daily  cefepime   IVPB 2000 milliGRAM(s) IV Intermittent every 12 hours  chlorhexidine 0.12% Liquid 15 milliLiter(s) Oral Mucosa every 12 hours  chlorhexidine 4% Liquid 1 Application(s) Topical <User Schedule>  dexMEDEtomidine Infusion 0.2 MICROgram(s)/kG/Hr (3.68 mL/Hr) IV Continuous <Continuous>  dextrose 50% Injectable 25 Gram(s) IV Push once  dextrose 50% Injectable 12.5 Gram(s) IV Push once  dextrose 50% Injectable 25 Gram(s) IV Push once  dextrose Oral Gel 15 Gram(s) Oral once  DOBUTamine Infusion 3 MICROgram(s)/kG/Min (6.62 mL/Hr) IV Continuous <Continuous>  glucagon  Injectable 1 milliGRAM(s) IntraMuscular once  insulin lispro (ADMELOG) corrective regimen sliding scale   SubCutaneous three times a day before meals  norepinephrine Infusion 0.05 MICROgram(s)/kG/Min (6.89 mL/Hr) IV Continuous <Continuous>  pantoprazole  Injectable 40 milliGRAM(s) IV Push two times a day  vancomycin  IVPB      vancomycin  IVPB 1000 milliGRAM(s) IV Intermittent every 24 hours  vasopressin Infusion 0.04 Unit(s)/Min (2.4 mL/Hr) IV Continuous <Continuous>    MEDICATIONS  (PRN):      Care Discussed with Consultants/Other Providers [x] YES  [ ] NO    HEALTH ISSUES - PROBLEM Dx:

## 2022-05-13 NOTE — CONSULT NOTE ADULT - CONVERSATION DETAILS
Patient unable to participate in goals of care discussion as pt currently intubated/sedated. Patient's wife defers to daughter, Josefina for decision making. Met with Josefina to discuss GOC. Josefina is a Palliative Medicine physician and has a very good understanding of patient's current condition. She shared that she has discussed patient's wishes for his care prior and he had expressed wanted a trial of intubation but would not want a trach. She also confirmed his DNR status. She stated at this point she understanding his prognosis is guarded but would like to continue medical management including pressors and trial of CRRT to see if he has any improvement. Josefina stated that patient has been doing well for the last 10 years and has not required hospitalization so this sudden decline has been shocking and difficult for the whole family. Validation and support provided provided. If patient does not improve or condition worsens she is amenable to further discussion regarding de-escalation of care/comfort based approach. All questions answered and emotional support provided.    DNR established by primary team.

## 2022-05-13 NOTE — CONSULT NOTE ADULT - CONSULT REQUESTED DATE/TIME
11-May-2022 19:10
12-May-2022 09:00
13-May-2022 14:31
12-May-2022
12-May-2022 12:39
12-May-2022 11:02
11-May-2022 19:03
13-May-2022 13:47

## 2022-05-13 NOTE — CONSULT NOTE ADULT - PROBLEM SELECTOR RECOMMENDATION 5
see above GOC note  DNR established by primary team  daughter wishes to continue current medical management

## 2022-05-13 NOTE — DIETITIAN INITIAL EVALUATION ADULT - NS FNS DIET ORDER
Diet, NPO with Tube Feed:   Tube Feeding Modality: Orogastric  Vital AF (VITALAFRTH)  Total Volume for 24 Hours (mL): 1200  Continuous  Starting Tube Feed Rate {mL per Hour}: 10  Increase Tube Feed Rate by (mL): 10     Every 4 hours  Until Goal Tube Feed Rate (mL per Hour): 50  Tube Feed Duration (in Hours): 24  Tube Feed Start Time: 04:35 (05-13-22 @ 04:32)

## 2022-05-13 NOTE — DIETITIAN INITIAL EVALUATION ADULT - ENERGY INTAKE
NPO TF currently not infusing, pt with OGT. OGT previously to [facility] as abdomen distended on admission.

## 2022-05-13 NOTE — DIETITIAN INITIAL EVALUATION ADULT - NSICDXPASTMEDICALHX_GEN_ALL_CORE_FT
PAST MEDICAL HISTORY:  CAD (coronary artery disease) reports angiogram - 1 year ago - pt reports non obstructive  at [facility]    CHF (congestive heart failure) (EF 30%)    Diabetes A1C 6.8  on admission    Former smoker     HLD (hyperlipidemia)     Hypertension

## 2022-05-13 NOTE — PROGRESS NOTE ADULT - ASSESSMENT
72 y/o M w/ CAD,CHF(EF 30%) s/p AICD, dm2 admitted w/ SOB and malaise hypotensive w/ elevated LA in setting of cardiogenic and distributive shock     ====================== NEUROLOGY=====================  sedated w/ prop and precedex gtt  - Daily sedation vacation   - Transition to precedex for possible SBT today      ==================== RESPIRATORY======================  Intubated, sedated  --continue vent support, attempt SBT today  Mode: AC/ CMV (Assist Control/ Continuous Mandatory Ventilation) RR (machine): 16 TV (machine): 450,FiO2: 30 PEEP: 5  ABG - ( 13 May 2022 00:55 )  pH, Arterial: 7.44  /  pCO2: 34    /  pO2: 99    / HCO3: 23    / Base Excess: -0.6  /  SaO2: 98.2        ====================CARDIOVASCULAR==================  Cardiogenic Shock  --Continue dobutamine for inotropic support  --wean vasopressors for MAP > 65  --stop digoxin in the setting of bradycardia and renal failure   --Impella d/c'd   --consult EP, patient with a flutter, recommend beta blocker when off pressors   --CVP goal 8-10  --continue to trend cardiac indices and end organ perfusion   --holding statin given transaminitis   --eventual ischemic w/up?  --heart failure following, appreciate recs     digoxin  Injectable 250 MICROGram(s) IV Push every 6 hours  DOBUTamine Infusion 5 MICROgram(s)/kG/Min (11 mL/Hr) IV Continuous <Continuous>    ===================HEMATOLOGIC/ONC ===================  --heme consult placed for thrombocytopenia, likely 2/2 impella  --s/p cryo, ffp and platelets for line placements   --peripheral smear reviewed, unremarkable   --LDH downtrending, continue to monitor     aspirin enteric coated 81 milliGRAM(s) Oral daily  heparin 50 Units/mL (IMPELLA VAD) Purge Solution  Unit(s) (15.8 mL/Hr) Ventricular Assist Device <Continuous>    ===================== RENAL =========================  ALISSA  --ALISSA on CKD, unclear baseline Cr  --Would favor keeping slightly positive today given mixed shock picture  --Hemolysis likely contributing to renal injury  --will continue to monitor renal function closely, avoid nephrotoxic medications    ==================== GASTROINTESTINAL===================  --NPO for now  -- start TF     pantoprazole  Injectable 40 milliGRAM(s) IV Push two times a day    =======================    ENDOCRINE  =====================  Hx DM2   - holding home antidiabetic agents  - ISS   - monitor blood glucose       dextrose 50% Injectable 25 Gram(s) IV Push once  dextrose 50% Injectable 12.5 Gram(s) IV Push once  dextrose 50% Injectable 25 Gram(s) IV Push once  dextrose Oral Gel 15 Gram(s) Oral once  glucagon  Injectable 1 milliGRAM(s) IntraMuscular once  insulin lispro (ADMELOG) corrective regimen sliding scale   SubCutaneous three times a day before meals  vasopressin Infusion 0.04 Unit(s)/Min (2.4 mL/Hr) IV Continuous <Continuous>    ========================INFECTIOUS DISEASE================  Metapneumovirus +  --continue empiric antibiotics, de-escalate if appropriate   --f/u infectious w/up  --+ human metapneumovirus  --f/u CDiff      Lines:   Lines: RIJ TLC (5/12-)  LRAL (5/12-)  Adamson catheter (5/11-)   70 y/o M w/ CAD,CHF(EF 30%) s/p AICD, dm2 admitted w/ SOB and malaise hypotensive w/ elevated LA in setting of cardiogenic and distributive shock     # NEUROLOGY  sedated w/ prop and precedex gtt  - Daily sedation vacation   - Transition to precedex for possible SBT today    # RESPIRATORY: Intubated for the airway protection   - Continue vent support, attempt SBT today  Mode: AC/ CMV (Assist Control/ Continuous Mandatory Ventilation) RR (machine): 16 TV (machine): 450,FiO2: 30 PEEP: 5  ABG - ( 13 May 2022 00:55 ) pH, Arterial: 7.44  /  pCO2: 34  /  pO2: 99    / HCO3: 23    / Base Excess: -0.6  /  SaO2: 98.2      # CARDIOVASCULAR: admitted w/ mixed shock /w/ Cardiogenic Shock S/P impella  - Cont. @3 as per HF   --wean vasopressors for MAP > 65  --CVP goal 8-10  --continue to trend cardiac indices and end organ perfusion   --holding statin given transaminitis   --eventual ischemic w/up?  - Follow up w/ HF     digoxin  Injectable 250 MICROGram(s) IV Push every 6 hours  DOBUTamine Infusion 5 MICROgram(s)/kG/Min (11 mL/Hr) IV Continuous <Continuous>    ===================HEMATOLOGIC/ONC ===================  --heme consult placed for thrombocytopenia, likely 2/2 impella  --s/p cryo, ffp and platelets for line placements   --peripheral smear reviewed, unremarkable   --LDH downtrending, continue to monitor     aspirin enteric coated 81 milliGRAM(s) Oral daily  heparin 50 Units/mL (IMPELLA VAD) Purge Solution  Unit(s) (15.8 mL/Hr) Ventricular Assist Device <Continuous>    ===================== RENAL =========================  ALISSA  --ALISSA on CKD, unclear baseline Cr  --Would favor keeping slightly positive today given mixed shock picture  --Hemolysis likely contributing to renal injury  --will continue to monitor renal function closely, avoid nephrotoxic medications    ==================== GASTROINTESTINAL===================  --NPO for now  -- start TF     pantoprazole  Injectable 40 milliGRAM(s) IV Push two times a day    =======================    ENDOCRINE  =====================  Hx DM2   - holding home antidiabetic agents  - ISS   - monitor blood glucose       dextrose 50% Injectable 25 Gram(s) IV Push once  dextrose 50% Injectable 12.5 Gram(s) IV Push once  dextrose 50% Injectable 25 Gram(s) IV Push once  dextrose Oral Gel 15 Gram(s) Oral once  glucagon  Injectable 1 milliGRAM(s) IntraMuscular once  insulin lispro (ADMELOG) corrective regimen sliding scale   SubCutaneous three times a day before meals  vasopressin Infusion 0.04 Unit(s)/Min (2.4 mL/Hr) IV Continuous <Continuous>    ========================INFECTIOUS DISEASE================  Metapneumovirus +  --continue empiric antibiotics, de-escalate if appropriate   --f/u infectious w/up  --+ human metapneumovirus  --f/u CDiff      Lines:   Lines: RIJ TLC (-)  LRAL (-)  Adamson catheter (-)   72 y/o M w/ CAD,CHF(EF 30%) s/p AICD, dm2 admitted w/ SOB and malaise hypotensive w/ elevated LA in setting of cardiogenic and distributive shock     # NEUROLOGY  sedated w/ prop and precedex gtt  - Daily sedation vacation   - Transition to precedex for possible SBT today    # RESPIRATORY: Intubated for the airway protection   - Continue vent support, attempt SBT today  Mode: AC/ CMV (Assist Control/ Continuous Mandatory Ventilation) RR (machine): 16 TV (machine): 450,FiO2: 30 PEEP: 5  ABG - ( 13 May 2022 00:55 ) pH, Arterial: 7.44  /  pCO2: 34  /  pO2: 99    / HCO3: 23    / Base Excess: -0.6  /  SaO2: 98.2      # CARDIOVASCULAR: admitted w/ mixed shock /w/ Cardiogenic Shock S/P impella  Today Mixed 73.1 CO/CI 7.3/3.4  CVP  - Cont. @3 as per HF   --wean vasopressors for MAP > 65  --CVP goal 8-10  - Hemodynamics q 12hrs   --holding statin given transaminitis   --eventual ischemic w/up?  - Follow up w/ HF     #GASTROINTESTINAL: NPO for now  - Start TF: as per nutrition karis AF@10ml/hr advanced as tolerated to 55ml x24hrs   - Start MVI     Elevated LFT in setting of shock liver w/ coagulopathy  - US of abd today  - Monitor LFT q 24hrs  - Hold hepatotoxic meds     # HEMATOLOGIC/ONC: coagulopathic and thrombocytopenia, likely 2/2 impella  --s/p cryo, ffp and platelets for line placements   --peripheral smear reviewed, unremarkable   --LDH downtrending, continue to monitor     # RENAL: ALISSA on CKD w/ unclear baseline Cr  I/O IN: 3243.4 mL / OUT: 1675 mL / NET: 1568.4 mL  - Renal consult called- non oliguric ALISSA  - Monitor BUN/CR and UO: Daughter addresses ok for CRRT if needed to give him a chance.     # INFECTIOUS DISEASE: Metapneumovirus +  - Cont. Vanco and cefepim, de-escalate if appropriate   --f/u infectious w/up    #MIS: Palliative care consult called as per family request   Lines:   Lines: RIJ TLC (-)  LRAL (-)  Adamson catheter (-)

## 2022-05-13 NOTE — DIETITIAN INITIAL EVALUATION ADULT - REASON INDICATOR FOR ASSESSMENT
Assessment warranted for length of stay in CICU.  Information obtained from interdisciplinary team, EMR.

## 2022-05-13 NOTE — DIETITIAN INITIAL EVALUATION ADULT - REASON FOR ADMISSION
Pt is a 72 y/o M w/ CAD,CHF(EF 30%) s/p AICD, dm2 admitted w/ SOB and malaise hypotensive w/ elevated LA in setting of cardiogenic and distributive shock

## 2022-05-13 NOTE — CONSULT NOTE ADULT - ASSESSMENT
71M hx CAD, CHF (EF30% sp ICD, DMII, HLD, p/w SOB and malaise found to be in cardiogenic shock requiring intubation and impella placement. Now s/p imprella. Pt remains intubated and sedated requiring pressors and inotropes. Course also c/b acute renal failure. Palliative consulted for GOC.

## 2022-05-13 NOTE — DIETITIAN INITIAL EVALUATION ADULT - OTHER INFO
-- Pt on Vaso and Dobutamine for pressor support. Sedated on Precedex and Propofol (currently off), pt received 188mL propofol x 24hr (207kcal).   -- Dosing wt 162lb. Most recent wt 160.9lb (5/12). On IV Lasix. Will continue to monitor.  -- HbA1c of 7.7% noted. Pt on Invokamet PTA.

## 2022-05-13 NOTE — CONSULT NOTE ADULT - ATTENDING COMMENTS
Patient is a 71 year old Male with PMHx DM, CHF (30%), AICD 1yr ago, HLD. who was admitted at [facility] on 5/11/22 with complaints of SOB, LLE swelling. In ER, pt. was tachypneic with systolic BP in 70s mmHg. Pt. was admitted for cardiogenic shock. Impella was placed on 5/11 and removed on 5/12. Over the course of hospital stay, Scr increased from 1.8 on admission to 4.13 today. Nephrology team was consulted for ALISSA management.    ALISSA: In the setting of cardiogenic shock  No H/O CKD per family  currently on pressors   Use lasix as needed Patient is a 71 year old Male with PMHx DM, CHF (30%), AICD 1yr ago, HLD. who was admitted at [facility] on 5/11/22 with complaints of SOB, LLE swelling. In ER, pt. was tachypneic with systolic BP in 70s mmHg. Pt. was admitted for cardiogenic shock. Impella was placed on 5/11 and removed on 5/12. Over the course of hospital stay, Scr increased from 1.8 on admission to 4.13 today. Nephrology team was consulted for ALISSA management.    ALISSA: In the setting of  shock ( possibly with cardiogenic and septic)  No H/O CKD per family  currently on pressors   Use lasix as needed  If he continues to remain oligo-anuric, will initiate CVVHD  Please reduce dose of cefepime to 1 gm IV QD ( so long as he is not on dialysis)   Please dc standing vancomycin order would dose based on levels  Dose meds for a clearance less than 10 cc/min    Rest per Dr.Nimkar Mitesh Rodriguez MD  O: [phone]  Contact me on teams

## 2022-05-13 NOTE — PROGRESS NOTE ADULT - SUBJECTIVE AND OBJECTIVE BOX
24H hour events: Impella removed, propofol off. Remains on Dobutamine/Vaso    MEDICATIONS:  aspirin  chewable 81 milliGRAM(s) Enteral Tube daily  DOBUTamine Infusion 3 MICROgram(s)/kG/Min IV Continuous <Continuous>  cefepime   IVPB 2000 milliGRAM(s) IV Intermittent every 12 hours  vancomycin  IVPB      vancomycin  IVPB 1000 milliGRAM(s) IV Intermittent every 24 hours  dexMEDEtomidine Infusion 0.2 MICROgram(s)/kG/Hr IV Continuous <Continuous>  propofol Infusion 30 MICROgram(s)/kG/Min IV Continuous <Continuous>  pantoprazole  Injectable 40 milliGRAM(s) IV Push two times a day  dextrose 50% Injectable 25 Gram(s) IV Push once  dextrose 50% Injectable 12.5 Gram(s) IV Push once  dextrose 50% Injectable 25 Gram(s) IV Push once  dextrose Oral Gel 15 Gram(s) Oral once  glucagon  Injectable 1 milliGRAM(s) IntraMuscular once  insulin lispro (ADMELOG) corrective regimen sliding scale   SubCutaneous three times a day before meals  vasopressin Infusion 0.04 Unit(s)/Min IV Continuous <Continuous>  chlorhexidine 0.12% Liquid 15 milliLiter(s) Oral Mucosa every 12 hours  chlorhexidine 4% Liquid 1 Application(s) Topical <User Schedule>      REVIEW OF SYSTEMS:  Complete 10point ROS negative.    PHYSICAL EXAM:  T(C): 36.8 (05-13-22 @ 07:00), Max: 37.1 (05-13-22 @ 03:00)  HR: 78 (05-13-22 @ 08:15) (64 - 83)  BP: --  RR: 16 (05-13-22 @ 08:15) (11 - 33)  SpO2: 100% (05-13-22 @ 08:15) (68% - 100%)  Wt(kg): --  I&O's Summary    12 May 2022 07:01  -  13 May 2022 07:00  --------------------------------------------------------  IN: 3243.4 mL / OUT: 2105 mL / NET: 1138.4 mL        Appearance: Normal	  Cardiovascular: Normal S1 S2, No JVD, No murmurs, No edema  Respiratory: Lungs clear to auscultation	  Psychiatry: A & O x 3, Mood & affect appropriate  Gastrointestinal:  Soft, Non-tender, + BS	  Skin: No rashes, No ecchymoses, No cyanosis	  Neurologic: Non-focal  Extremities: No clubbing, cyanosis or edema  Vascular: Peripheral pulses palpable 2+ bilaterally        LABS:	 	    CBC Full  -  ( 13 May 2022 00:57 )  WBC Count : 11.54 K/uL  Hemoglobin : 11.0 g/dL  Hematocrit : 34.6 %  Platelet Count - Automated : 51 K/uL  Mean Cell Volume : 93.5 fl  Mean Cell Hemoglobin : 29.7 pg  Mean Cell Hemoglobin Concentration : 31.8 gm/dL  Auto Neutrophil # : x  Auto Lymphocyte # : x  Auto Monocyte # : x  Auto Eosinophil # : x  Auto Basophil # : x  Auto Neutrophil % : x  Auto Lymphocyte % : x  Auto Monocyte % : x  Auto Eosinophil % : x  Auto Basophil % : x    05-13    144  |  99  |  57<H>  ----------------------------<  187<H>  3.8   |  21<L>  |  4.13<H>  05-12    147<H>  |  100  |  48<H>  ----------------------------<  183<H>  4.0   |  21<L>  |  3.31<H>    Ca    8.2<L>      13 May 2022 00:56  Ca    8.7      12 May 2022 14:38  Phos  7.0     05-13  Phos  5.7     05-12  Mg     2.1     05-13  Mg     2.0     05-12    TPro  4.3<L>  /  Alb  2.7<L>  /  TBili  6.6<H>  /  DBili  x   /  AST  97760<H>  /  ALT  7381<H>  /  AlkPhos  115  05-13  TPro  4.2<L>  /  Alb  2.5<L>  /  TBili  5.1<H>  /  DBili  x   /  AST  9096<H>  /  ALT  6743<H>  /  AlkPhos  91  05-12      proBNP: Serum Pro-Brain Natriuretic Peptide: 82104 pg/mL (05-11 @ 22:39)  Serum Pro-Brain Natriuretic Peptide: 48290 pg/mL (05-11 @ 18:15)      Creatine Kinase, Serum: 590 U/L (05-12-22 @ 14:38)      TELEMETRY: AT/AFL 70-80s

## 2022-05-13 NOTE — PROGRESS NOTE ADULT - SUBJECTIVE AND OBJECTIVE BOX
Subjective:  - remains intubated/sedated  - having   - levo started for hypotension      Medications:  aspirin  chewable 81 milliGRAM(s) Enteral Tube daily  cefepime   IVPB 2000 milliGRAM(s) IV Intermittent every 12 hours  chlorhexidine 0.12% Liquid 15 milliLiter(s) Oral Mucosa every 12 hours  chlorhexidine 4% Liquid 1 Application(s) Topical <User Schedule>  dexMEDEtomidine Infusion 0.2 MICROgram(s)/kG/Hr IV Continuous <Continuous>  dextrose 50% Injectable 25 Gram(s) IV Push once  dextrose 50% Injectable 12.5 Gram(s) IV Push once  dextrose 50% Injectable 25 Gram(s) IV Push once  dextrose Oral Gel 15 Gram(s) Oral once  DOBUTamine Infusion 3 MICROgram(s)/kG/Min IV Continuous <Continuous>  glucagon  Injectable 1 milliGRAM(s) IntraMuscular once  insulin lispro (ADMELOG) corrective regimen sliding scale   SubCutaneous three times a day before meals  norepinephrine Infusion 0.05 MICROgram(s)/kG/Min IV Continuous <Continuous>  pantoprazole  Injectable 40 milliGRAM(s) IV Push two times a day  vancomycin  IVPB      vancomycin  IVPB 1000 milliGRAM(s) IV Intermittent every 24 hours  vasopressin Infusion 0.04 Unit(s)/Min IV Continuous <Continuous>    Physical Exam:    Vitals:  Vital Signs Last 24 Hours  T(C): 36.5 (05-13-22 @ 11:00), Max: 37.1 (05-13-22 @ 03:00)  HR: 80 (05-13-22 @ 15:00) (66 - 83)  BP: 68//50  RR: 16 (05-13-22 @ 15:00) (11 - 34)  SpO2: 100% (05-13-22 @ 15:00) (68% - 100%)    I&O's Summary    12 May 2022 07:01  -  13 May 2022 07:00  --------------------------------------------------------  IN: 3243.4 mL / OUT: 2105 mL / NET: 1138.4 mL    13 May 2022 07:01  -  13 May 2022 15:10  --------------------------------------------------------  IN: 300.9 mL / OUT: 370 mL / NET: -69.1 mL    Tele: aflutter PVCs    General: Intubated/sedated  HEENT: Deferred  Neck: Neck supple. JVP mildly elevated. No masses  Chest: Clear to auscultation bilaterally. Compliant with vent  CV: Normal S1 and S2. No murmurs, rub, or gallops. Radial pulses normal.  Abdomen: Soft, non-distended, non-tender  : medellin in place with hematuria  Skin: No rashes or skin breakdown. jaundice  Neurology: Intubated/sedated  Psych: JOHN    Labs:                        10.2   7.80  )-----------( 42       ( 13 May 2022 13:20 )             33.2     05-13    145  |  99  |  65<H>  ----------------------------<  121<H>  3.5   |  20<L>  |  4.99<H>    Ca    8.2<L>      13 May 2022 13:20  Phos  7.1     05-13  Mg     2.1     05-13    TPro  4.6<L>  /  Alb  3.2<L>  /  TBili  8.2<H>  /  DBili  x   /  AST  29677<H>  /  ALT  7109<H>  /  AlkPhos  123<H>  05-13    PT/INR - ( 13 May 2022 00:56 )   PT: 35.7 sec;   INR: 3.07 ratio         PTT - ( 13 May 2022 00:56 )  PTT:48.9 sec  CARDIAC MARKERS ( 12 May 2022 14:38 )  x     / x     / 590 U/L / x     / x      CARDIAC MARKERS ( 12 May 2022 04:39 )  x     / x     / 595 U/L / x     / 7.2 ng/mL  CARDIAC MARKERS ( 11 May 2022 22:39 )  x     / x     / 404 U/L / x     / 4.4 ng/mL    Serum Pro-Brain Natriuretic Peptide: 10720 pg/mL (05-11 @ 22:39)  Serum Pro-Brain Natriuretic Peptide: 40292 pg/mL (05-11 @ 18:15)  Creatine Kinase, Serum: 590 U/L (05-12-22 @ 14:38)  Creatine Kinase, Serum: 595 U/L (05-12-22 @ 04:39)  Creatine Kinase, Serum: 404 U/L (05-11-22 @ 22:39)    Oxygen Saturation, Mixed: 67.7 (05-13 @ 13:05)  Oxygen Saturation, Mixed: 73.1 (05-13 @ 05:59)    Lactate Dehydrogenase, Serum: 46214 U/L (05-12 @ 14:38)  Lactate Dehydrogenase, Serum: 34324 U/L (05-12 @ 08:59)  Lactate Dehydrogenase, Serum: 7655 U/L (05-12 @ 00:08)  Lactate Dehydrogenase, Serum: 3875 U/L (05-11 @ 22:39)    Lactate, Blood: 13.6 mmol/L (05-11 @ 22:39)

## 2022-05-13 NOTE — DIETITIAN INITIAL EVALUATION ADULT - ENTERAL
When feasible, initiate feeds of Vital AF/1.2 at 10mL/hr advancing as tolerated to goal rate of 55mL/hr x 24hr to provide 1320ml total volume, 1584kcal, 99g protein, 1071ml free water. Regimen to meet 21.6kcal/kg, 1.35g/kg protein based on current wt 73kg. Defer additional free water flushes to team.

## 2022-05-13 NOTE — CHART NOTE - NSCHARTNOTEFT_GEN_A_CORE
Spoke to Heart Failure attending, Dr Hilton regarding his numbers. Pts mixed venous saturation is 73.2%, CVP is 5 and PCWP is 8. We suggested giving the patient albumin and Dr Hilton agreed with continuing fluid replacement as his filling pressures are low. We also discussed weaning dobutamine from 5 and she agreed to decrease to 3. We will recheck numbers in 2 hours and re-evaluate.     Yaritza Roman PA-C

## 2022-05-13 NOTE — CONSULT NOTE ADULT - PROBLEM SELECTOR RECOMMENDATION 9
Pt. with ALISSA in the setting of cardiogenic shock. On review of [facility], it was noted that Scr was 1.80 on 5/11 on admission. Scr increased to 4.13 today. No prior labs available. Pt.s daughter denied Hx of kidney disease in the past. UA showed trace proteins and significant glucosuria. Check urine electrolytes and kidney sonogram. Pt. likely has ALISSA/ATN in the setting of cardiogenic shock. Currently on IV vasopressor support. Pt. currently not in vol overload. Monitor UOP, If it remains low, will consider starting CRRT. Plan discussed with family and primary team. CRRT consent obtained and kept in pts. chart. Monitor labs and urine output. Avoid any potential nephrotoxins. Dose medications as per eGFR.     If you have any questions, please feel free to contact me  Roque Duong  Nephrology Fellow  [phone]/ [employer] Teams(Preferred)  (After 5pm or on weekends please page the on-call fellow). Pt. with ALISSA in the setting of cardiogenic shock. On review of [facility], it was noted that Scr was 1.80 on 5/11 on admission. Scr increased to 4.13 today. No prior labs available. Pt.s daughter denied Hx of kidney disease in the past. UA showed trace proteins and significant glucosuria. Check urine electrolytes and kidney sonogram. Pt. likely has ALISSA/ATN in the setting of cardiogenic shock. Currently on IV vasopressor support. Pt. currently not in vol overload. Monitor UOP, If it remains low, would give diuretics. If continues to decline, will consider starting CRRT. Plan discussed with family and primary team. CRRT consent obtained and kept in pts. chart. Monitor labs and urine output. Avoid any potential nephrotoxins. Dose medications as per eGFR.     If you have any questions, please feel free to contact me  Roque Duong  Nephrology Fellow  [phone]/ [employer] Teams(Preferred)  (After 5pm or on weekends please page the on-call fellow).

## 2022-05-13 NOTE — PROGRESS NOTE ADULT - ASSESSMENT
71M hx CAD, CHF (EF30% sp ICD, DMII, HLD, p/w SOB and malaise found to be in cardiogenic shock requiring intubation and impella placement. Medicine consult called for co medical mgmt.    # Cardiogenic Shock  # acute hypoxic resp failure  # HFrEF  # CAD  # DM2  # Metapneumovirus   # ALISSA  # thrombocytopenia  # atrial tach    on vaso and dobutamine, wean vasopressors as tolerated  Impella removed  empiric abx  BC NTD  appreciate heme recs  appreciate EP recs, BB once stable  monitor LFTs and creat  ISS  hold statin  appreciate CICU care    DNR    Please call [facility] with questions [phone]. 71M hx CAD, CHF (EF30% sp ICD, DMII, HLD, p/w SOB and malaise found to be in cardiogenic shock requiring intubation and impella placement. Medicine consult called for co medical mgmt.    # Cardiogenic Shock  # acute hypoxic resp failure  # HFrEF  # CAD  # DM2  # Metapneumovirus   # ALISSA  # thrombocytopenia  # atrial tach    on vaso and dobutamine  wean vasopressors as tolerated  impella removed  empiric abx  BC NTD  appreciate heme recs  appreciate EP recs, BB once stable  monitor LFTs and creat  ISS  hold statin  appreciate CICU care    C DNR    [facility] [phone].

## 2022-05-13 NOTE — PROGRESS NOTE ADULT - SUBJECTIVE AND OBJECTIVE BOX
ALAN DE LA ROSA  MRN-[id number]  Patient is a 71y old  Male who presents with a chief complaint of Cardiogenic Shock (13 May 2022 15:07)    HPI:   History obtained from wife, daughter and charts.     Pt is a 71M with PMHx DMII, CHF (30%), AICD 1yr ago, HLD. He is a former smoker (quit approx 30 years ago). Pt received his second COVID 19 booster shot this week.     Per daughter, pt began having some new lower extremity edema last week and later developed a night time cough. His symptoms improved after a few days until today when his cough worsened and he became extremely tachypneic and short of breath. Daughter became very concerned and brought him to ED.     In the ED, pt was tachypneic, with systolic BP in the 70s maxed on levo. He was also on Bipap with RR 40s, TV 200s and not mentating well. Lactate was reportedly 11 and pH was 7. In the ED, he was started on milrinone and dobutamine and then shock team was activated. The patient was taken to cath lab and an Impella was placed.     Upon arrival to CICU, pt was on Dobutamine @10, Milrinone @.125, Vaso 0.08, Levo @ 1.0, intubated on PEEP 8 and fio2 100%   (11 May 2022 22:58)      Hospital Course:   Transferred to CCU for further management     24 HOUR EVENTS:    REVIEW OF SYSTEMS:  Unable to obtain, pt is intubated and sedated.         ICU Vital Signs Last 24 Hrs  T(C): 36.7 (13 May 2022 15:00), Max: 37.1 (13 May 2022 03:00)  T(F): 98.1 (13 May 2022 15:00), Max: 98.8 (13 May 2022 03:00)  HR: 76 (13 May 2022 19:45) (72 - 117)  BP: --  BP(mean): --  ABP: 113/49 (13 May 2022 19:45) (67/40 - 139/41)  ABP(mean): 67 (13 May 2022 19:45) (47 - 116)  RR: 18 (13 May 2022 19:45) (16 - 34)  SpO2: 100% (13 May 2022 19:45) (68% - 100%)    Mode: AC/ CMV (Assist Control/ Continuous Mandatory Ventilation), RR (machine): 16, TV (machine): 450, FiO2: 30, PEEP: 5, ITime: 1  CVP(mm Hg): 9 (22 @ 19:45) (0 - 74)  CO: 3.8 (22 @ 18:45) (3.8 - 6.3)  CI: 2 (22 @ 18:45) (2 - 3.4)  PA: 52/18 (22 @ 19:45) (25/9 - 65/20)  PA(mean): 28 (22 @ 19:45) (15 - 45)  PA(direct): --  PCWP: --  LA: --  RA: --  SVR: 946 (22 @ 18:45) (472 - 4834)  SVRI: 1797 (22 @ 18:45) (8277 - 9144)  PVR: --  PVRI: --  I&O's Summary    12 May 2022 07:01  -  13 May 2022 07:00  --------------------------------------------------------  IN: 3243.4 mL / OUT: 2105 mL / NET: 1138.4 mL    13 May 2022 07:01  -  13 May 2022 20:12  --------------------------------------------------------  IN: 487.5 mL / OUT: 965 mL / NET: -477.5 mL        CAPILLARY BLOOD GLUCOSE    CAPILLARY BLOOD GLUCOSE      POCT Blood Glucose.: 97 mg/dL (13 May 2022 16:05)      PHYSICAL EXAM:  Appearance: Normal, NAD  HEAD:  Normocephalic  EYES: PERRLA, conjunctiva and sclera clear  NECK: + ETT midline, Supple, No JVD  CHEST/LUNG: Clear to auscultation bilaterally; No wheezing  HEART: Normal S1, S2. No murmurs, rubs, or gallops  ABDOMEN: + Bowel sounds, Soft, NT, ND   EXTREMITIES:  2+ Peripheral Pulses, No clubbing, cyanosis, or edema  NEUROLOGY: intubated and sedated   Lymphatic: No lymphadenopathy  SKIN: No rashes or lesions      ============================I/O===========================   I&O's Detail    12 May 2022 07:01  -  13 May 2022 07:00  --------------------------------------------------------  IN:    Cryoprecipitate: 150 mL    Dexmedetomidine: 9 mL    DOBUTamine: 36 mL    DOBUTamine: 165 mL    Enteral Tube Flush: 60 mL    IV PiggyBack: 127.8 mL    IV PiggyBack: 1850 mL    Plasma: 300 mL    Platelets - Single Donor: 225 mL    Propofol: 188 mL    Vasopressin: 132.6 mL  Total IN: 3243.4 mL    OUT:    Indwelling Catheter - Urethral (mL): 705 mL    Rectal Tube (mL): 1400 mL  Total OUT: 2105 mL    Total NET: 1138.4 mL      13 May 2022 07:01  -  13 May 2022 20:12  --------------------------------------------------------  IN:    Dexmedetomidine: 21.9 mL    Dexmedetomidine: 3.7 mL    DOBUTamine: 72.6 mL    Enteral Tube Flush: 180 mL    Norepinephrine: 53.3 mL    Vasopressin: 66 mL    Vital1.5: 90 mL  Total IN: 487.5 mL    OUT:    Indwelling Catheter - Urethral (mL): 265 mL    Propofol: 0 mL    Rectal Tube (mL): 700 mL  Total OUT: 965 mL    Total NET: -477.5 mL        ============================ LABS =========================                        10.2   7.80  )-----------( 42       ( 13 May 2022 13:20 )             33.2         145  |  99  |  65<H>  ----------------------------<  121<H>  3.5   |  20<L>  |  4.99<H>    Ca    8.2<L>      13 May 2022 13:20  Phos  7.1       Mg     2.1         TPro  4.6<L>  /  Alb  3.2<L>  /  TBili  8.2<H>  /  DBili  x   /  AST  83370<H>  /  ALT  7109<H>  /  AlkPhos  123<H>      Troponin T, High Sensitivity Result: 807 ng/L (22 @ 14:38)  Troponin T, High Sensitivity Result: 597 ng/L (22 @ 04:39)  Troponin T, High Sensitivity Result: 283 ng/L (22 @ 22:39)  Troponin T, High Sensitivity Result: 99 ng/L (22 @ 18:15)    CKMB Units: 7.2 ng/mL (22 @ 04:39)  CKMB Units: 4.4 ng/mL (22 @ 22:39)    Creatine Kinase, Serum: 590 U/L (22 @ 14:38)  Creatine Kinase, Serum: 595 U/L (22 @ 04:39)  Creatine Kinase, Serum: 404 U/L (22 @ 22:39)    CPK Mass Assay %: 1.2 % (22 @ 04:39)  CPK Mass Assay %: 1.1 % (22 @ 22:39)        LIVER FUNCTIONS - ( 13 May 2022 13:20 )  Alb: 3.2 g/dL / Pro: 4.6 g/dL / ALK PHOS: 123 U/L / ALT: 7109 U/L / AST: 18458 U/L / GGT: x           PT/INR - ( 13 May 2022 00:56 )   PT: 35.7 sec;   INR: 3.07 ratio         PTT - ( 13 May 2022 00:56 )  PTT:48.9 sec  ABG - ( 13 May 2022 18:30 )  pH, Arterial: 7.42  pH, Blood: x     /  pCO2: 32    /  pO2: 119   / HCO3: 21    / Base Excess: -3.0  /  SaO2: 99.2              Blood Gas Arterial, Lactate: 6.2 mmol/L (22 @ 18:30)  Lactate, Blood: 6.2 mmol/L (22 @ 16:19)  Blood Gas Arterial, Lactate: 5.5 mmol/L (22 @ 13:05)  Blood Gas Arterial, Lactate: 6.5 mmol/L (22 @ 00:55)  Blood Gas Arterial, Lactate: 7.0 mmol/L (22 @ 18:40)  Blood Gas Venous - Lactate: 8.5 mmol/L (22 @ 15:13)  Blood Gas Arterial, Lactate: 8.4 mmol/L (22 @ 14:25)  Blood Gas Arterial, Lactate: 10.0 mmol/L (22 @ 08:37)  Blood Gas Arterial, Lactate: 11.9 mmol/L (22 @ 04:23)  Blood Gas Arterial, Lactate: 12.9 mmol/L (22 @ 02:42)  Blood Gas Arterial, Lactate: 12.7 mmol/L (22 @ 00:56)  Blood Gas Arterial, Lactate: 13.6 mmol/L (22 @ 23:39)  Lactate, Blood: 13.6 mmol/L (22 @ 22:39)  Blood Gas Arterial, Lactate: 13.0 mmol/L (22 @ 22:22)  Blood Gas Arterial, Lactate: 11.6 mmol/L (22 @ 18:45)  Blood Gas Venous - Lactate: 10.1 mmol/L (22 @ 17:25)    Urinalysis Basic - ( 11 May 2022 22:13 )    Color: Yellow / Appearance: Clear / S.021 / pH: x  Gluc: x / Ketone: Trace  / Bili: Negative / Urobili: <2 mg/dL   Blood: x / Protein: 30 mg/dL / Nitrite: Negative   Leuk Esterase: Negative / RBC: 141 /HPF / WBC 2 /HPF   Sq Epi: x / Non Sq Epi: 0 /HPF / Bacteria: Negative      ======================Micro/Rad/Cardio=================  Telemtry: Reviewed   EKG: Reviewed  CXR: Reviewed  Culture: Reviewed   Echo:   Cath: Cardiac Cath Lab - Adult:   [facility]  Department of Cardiology  [address] [place] [id number]  [phone]  Cath Lab Report -- Comprehensive Report  Patient: ALAN DE LA ROSA  Study date: 10/03/2014  Account number: [id number]  MR number:[id number]  : 1950  Gender: Male  Race: O  Case Physician(s):  King Souza M.D.  Referring Physician:  Steven Goldberg, M.D.  INDICATIONS: Angina/MI: myocardial infarction without ST elevation  (NSTEMI). Congestive heart failure with ischemic cardiomyopathy.  HISTORY: History includes ischemic cardiomyopathy. The patient has a  history of coronary artery disease. The patient has hypertension and oral  hypoglycemic-treated diabetes.  PROCEDURE:  --  Right heart catheterization.  --  Left heart catheterization with ventriculography.  --  Left coronary angiography.  --  Right coronary angiography.  TECHNIQUE: The risks and alternatives of the procedures and conscious  sedation were explained to the patient and informed consent was obtained.  Cardiac catheterization performed electively.  Local anesthetic given. Right femoral artery access. Right femoral vein  access. Right heart catheterization. The procedure was performed utilizing  a catheter. Left heart catheterization. Ventriculography was performed.  Left coronary artery angiography. The vessel was injected utilizing a  catheter. Right coronary artery angiography. The vessel was injected  utilizing a catheter. RADIATION EXPOSURE: 1.8 min.  CONTRAST GIVEN: Omnipaque 79 ml.  MEDICATIONS GIVEN: Midazolam, 1 mg, IV. Fentanyl, 25 mcg, IV.  VENTRICLES: EF calculated by contrast ventriculography was 20 % and EF  estimated was 20 %. Dilated LV with severe global LV hypokinesia  CORONARY VESSELS: The coronary circulation is right dominant.  LM:   --  LM: Normal.  LAD:   --  LAD: Angiography showed minor luminal irregularities with no  flow limiting lesions.  --  D1: There was a 75 % stenosis. Diffuse small caliber vessel  CX:   --  Distal circumflex: There was a 100 % stenosis. There was good  collateral blood supply to the distal myocardium. unchanged form 9 mos ago  --  OM1: Angiography showed minor luminal irregularities with no flow  limiting lesions. small diffuely diseased  RCA:   --  RCA: Angiography showed minor luminal irregularities with no  flow limiting lesions.  --  RPLS: There was a 99 % stenosis. diffsuely diseased  --  RV marginal 1: There was a 95 % stenosis. small vessel unchanged  COMPLICATIONS: There were no complications.  DIAGNOSTIC IMPRESSIONS: Patient has severe LV dysfunction, dilated LV with  distal coronary diseas that is unchanged from 9 months ago.  DIAGNOSTIC RECOMMENDATIONS: Medical and device therapy for dilated  cardiomyopathy and systolic heart failure.  Prepared and signed by  King Souza M.D.  Signed 10/03/2014 15:16:12  HEMODYNAMIC TABLES  Pressures:  Baseline/ Room Air  Pressures:  - HR: 59  Pressures:  - Rhythm:  Pressures:  -- Aortic Pressure (S/D/M): 134/60/69  Pressures:  -- Left Ventricle (s/edp): 130/29/--  Pressures:  -- Pulmonary Artery (S/D/M): 37/14/24  Pressures:  -- Pulmonary Capillary Wedge: 18/24/16  Pressures:  -- Right Atrium (a/v/M): 15/5/4  Pressures:  -- Right Ventricle (s/edp): 36/7/--  O2 Sats:  Baseline/ Room Air  O2 Sats:  - HR: 59  O2 Sats:  - Rhythm:  O2 Sats:  -- AO: 14.1/93.4/17.91  O2 Sats:  -- PA: 13.3/70.3/12.72  Outputs:  Baseline/ Room Air  Outputs:  -- CALCULATIONS: Age in years: 64.20  Outputs:  -- CALCULATIONS: Body Surface Area: 1.81  Outputs:  -- CALCULATIONS: Height in cm: 165.00  Outputs:  -- CALCULATIONS: Sex: Male  Outputs:  -- CALCULATIONS: Weight in k.50  Outputs:  -- OUTPUTS: Blood Oxygen Difference: 5.19  Outputs:  -- OUTPUTS: CO by Kendall: 4.64  Outputs:  -- OUTPUTS: Kendall cardiac index: 2.57  Outputs:  -- OUTPUTS: O2 consumption: 241.00  Outputs:  -- OUTPUTS: Vo2 Indexed: 133.29  Outputs:  -- RESISTANCES: Left ventricular stroke work: 53.94  Outputs:  -- RESISTANCES: Left Ventricular Stroke Work index: 29.83  Outputs:  -- RESISTANCES: Pulmonary vascular index (dsc): 249.35  Outputs:  -- RESISTANCES: Pulmonary vascular index (Wood Units): 3.12  Outputs:  -- RESISTANCES: Pulmonary vascular resistance (dsc): 137.91  Outputs:  -- RESISTANCES: Pulmonary vascular resistance (Wood Units): 1.72  Outputs:  -- RESISTANCES: PVR_SVR Ratio: 0.12  Outputs:  -- RESISTANCES: Right ventricular stroke work: 22.53  Outputs:  -- RESISTANCES: Right ventricular stroke work index: 12.46  Outputs:  -- RESISTANCES: Systemic vascular index (dsc): 2025.99  Outputs:  -- RESISTANCES: Systemic vascular index (Wood Units): 25.33  Outputs:  -- RESISTANCES: Systemic vascular resistance (dsc): 1120.53  Outputs:  -- RESISTANCES: Systemic vascular resistance (Wood Units): 14.01  Outputs:  -- RESISTANCES: Total pulmonary index(dsc): 748.06  Outputs:  -- RESISTANCES: Total pulmonary index (Wood Units): 9.35  Outputs:  -- RESISTANCES: Total pulmonary resistance (dsc): 413.73  Outputs:  -- RESISTANCES: Total pulmonary resistance (Wood Units): 5.17  Outputs:  -- RESISTANCES: Total vascular index (Wood Units): 26.89  Outputs:  -- RESISTANCES: Total vascular resistance (dsc): 1189.48  Outputs:  -- RESISTANCES: Total vascular resistance (Wood Units): 14.87  Outputs:  -- RESISTANCES: Total vascular resistance index (dsc): 2150.67  Outputs:  -- RESISTANCES: TPR_TVR Ratio: 0.35  Outputs:  -- SHUNTS: Qs Indexed: 2.57  Outputs:  -- SHUNTS: Systemic flow: 4.64 (10-03-14 @ 12:17)    ======================================================  PAST MEDICAL & SURGICAL HISTORY:  Hypertension      Diabetes  A1C 6.8  on admission      Former smoker      HLD (hyperlipidemia)      CAD (coronary artery disease)  reports angiogram - 1 year ago - pt reports non obstructive  at [facility]      CHF (congestive heart failure)  (EF 30%)      H/O fracture of wrist  and left ankle (ORIF ankle) following fall        ====================ASSESSMENT ==============  72 y/o M w/ CAD,CHF(EF 30%) s/p AICD, dm2 admitted w/ SOB and malaise hypotensive w/ elevated LA in setting of cardiogenic and distributive shock       Plan:  ====================== NEUROLOGY=====================  sedated w/ precedex gtt  - Daily sedation vacation   - Transition to precedex for possible SBT today    dexMEDEtomidine Infusion 0.2 MICROgram(s)/kG/Hr (3.68 mL/Hr) IV Continuous <Continuous>    ==================== RESPIRATORY======================  - On full vent support, requiring close monitoring of respiratory rate, breathing pattern, pulse oximetry monitoring, and intermittent blood gas analysis.   - attempt SBT today    Mechanical Ventilation:  Mode: AC/ CMV (Assist Control/ Continuous Mandatory Ventilation)  RR (machine): 16  TV (machine): 450  FiO2: 30  PEEP: 5  ITime: 1  MAP: 10  PIP: 26      ====================CARDIOVASCULAR==================   admitted w/ mixed shock /w/ Cardiogenic Shock S/P impella  Today Mixed 73.1 CO/CI 7.3/3.4  CVP  - Cont. @3 as per HF   --wean vasopressors for MAP > 65, c/w Levo  --CVP goal 8-10  - Hemodynamics q 12hrs   --holding statin given transaminitis   --eventual ischemic w/up?  - Follow up w/ HF   - C/w ASA    aspirin  chewable 81 milliGRAM(s) Enteral Tube daily  DOBUTamine Infusion 3 MICROgram(s)/kG/Min (6.62 mL/Hr) IV Continuous <Continuous>  norepinephrine Infusion 0.05 MICROgram(s)/kG/Min (6.89 mL/Hr) IV Continuous <Continuous>  vasopressin Infusion 0.04 Unit(s)/Min (2.4 mL/Hr) IV Continuous <Continuous>    ===================HEMATOLOGIC/ONC ===================  coagulopathic and thrombocytopenia, likely 2/2 impella  --s/p cryo, ffp and platelets for line placements   --peripheral smear reviewed, unremarkable   --LDH downtrending, continue to monitor         ===================== RENAL =========================  ALISSA on CKD w/ unclear baseline Cr  I/O IN: 3243.4 mL / OUT: 1675 mL / NET: 1568.4 mL  - Renal consult called- non oliguric ALISSA  - Monitor BUN/CR and UO: Daughter addresses ok for CRRT if needed to give him a chance.       ==================== GASTROINTESTINAL===================  -as per nutrition vital AF @10cc/hr advanced as tolerated to 50ml x24hrs   - Start MVI     Elevated LFT in setting of shock liver w/ coagulopathy  - US of abd today  - Monitor LFT q 24hrs  - Hold hepatotoxic meds   - Continue Protonix for stress ulcer prophylaxis.     calcium gluconate IVPB 2 Gram(s) IV Intermittent once  multivitamin/minerals/iron Oral Solution (CENTRUM) 15 milliLiter(s) Oral daily  pantoprazole  Injectable 40 milliGRAM(s) IV Push two times a day    =======================    ENDOCRINE  =====================  DMT2  -  glycemic control with Admelog sliding scale and Glucagon PRN.   -Monitor blood glucose levels.     dextrose 50% Injectable 25 Gram(s) IV Push once  dextrose 50% Injectable 12.5 Gram(s) IV Push once  dextrose 50% Injectable 25 Gram(s) IV Push once  dextrose Oral Gel 15 Gram(s) Oral once  glucagon  Injectable 1 milliGRAM(s) IntraMuscular once  insulin lispro (ADMELOG) corrective regimen sliding scale   SubCutaneous three times a day before meals      ========================INFECTIOUS DISEASE================  Metapneumovirus +  - S/P. Vanco and cefepim, de-escalate if appropriate   --f/u infectious w/up  - wbc within normal limits     ========================MIS=========================  -Palliative care consult called as per family request     Patient requires continuous monitoring with bedside rhythm monitoring, pulse ox monitoring, and intermittent blood gas analysis. Care plan discussed with ICU care team. Patient remained critical and at risk for life threatening decompensation.  Patient seen, examined and plan discussed with CCU team during rounds.     I have personally provided ____ minutes of critical care time excluding time spent on separate procedures, in addition to initial critical care time provided by the CICU Attending, .     By signing my name below, I, Carmella Zhao, attest that this documentation has been prepared under the direction and in the presence of Anjana Osei NP  Electronically signed: Nadia Suarez, 22 @ 20:12    I, Anjana Osei NP, personally performed the services described in this documentation. all medical record entries made by the scribe were at my direction and in my presence. I have reviewed the chart and agree that the record reflects my personal performance and is accurate and complete  Electronically signed: Anjana Osei NP        ALAN DE LA ROSA  MRN-[id number]  Patient is a 71y old  Male who presents with a chief complaint of Cardiogenic Shock (13 May 2022 15:07)    HPI:   History obtained from wife, daughter and charts.     Pt is a 71M with PMHx DMII, CHF (30%), AICD 1yr ago, HLD. He is a former smoker (quit approx 30 years ago). Pt received his second COVID 19 booster shot this week.     Per daughter, pt began having some new lower extremity edema last week and later developed a night time cough. His symptoms improved after a few days until today when his cough worsened and he became extremely tachypneic and short of breath. Daughter became very concerned and brought him to ED.     In the ED, pt was tachypneic, with systolic BP in the 70s maxed on levo. He was also on Bipap with RR 40s, TV 200s and not mentating well. Lactate was reportedly 11 and pH was 7. In the ED, he was started on milrinone and dobutamine and then shock team was activated. The patient was taken to cath lab and an Impella was placed.     Upon arrival to CICU, pt was on Dobutamine @10, Milrinone @.125, Vaso 0.08, Levo @ 1.0, intubated on PEEP 8 and fio2 100%   (11 May 2022 22:58)      Hospital Course:   Transferred to CCU for further management     24 HOUR EVENTS:    REVIEW OF SYSTEMS:  Unable to obtain, pt is intubated and sedated.         ICU Vital Signs Last 24 Hrs  T(C): 36.7 (13 May 2022 15:00), Max: 37.1 (13 May 2022 03:00)  T(F): 98.1 (13 May 2022 15:00), Max: 98.8 (13 May 2022 03:00)  HR: 76 (13 May 2022 19:45) (72 - 117)  BP: --  BP(mean): --  ABP: 113/49 (13 May 2022 19:45) (67/40 - 139/41)  ABP(mean): 67 (13 May 2022 19:45) (47 - 116)  RR: 18 (13 May 2022 19:45) (16 - 34)  SpO2: 100% (13 May 2022 19:45) (68% - 100%)    Mode: AC/ CMV (Assist Control/ Continuous Mandatory Ventilation), RR (machine): 16, TV (machine): 450, FiO2: 30, PEEP: 5, ITime: 1  CVP(mm Hg): 9 (22 @ 19:45) (0 - 74)  CO: 3.8 (22 @ 18:45) (3.8 - 6.3)  CI: 2 (22 @ 18:45) (2 - 3.4)  PA: 52/18 (22 @ 19:45) (25/9 - 65/20)  PA(mean): 28 (22 @ 19:45) (15 - 45)  PA(direct): --  PCWP: --  LA: --  RA: --  SVR: 946 (22 @ 18:45) (510 - 1974)  SVRI: 1797 (22 @ 18:45) (4353 - 3227)  PVR: --  PVRI: --  I&O's Summary    12 May 2022 07:01  -  13 May 2022 07:00  --------------------------------------------------------  IN: 3243.4 mL / OUT: 2105 mL / NET: 1138.4 mL    13 May 2022 07:01  -  13 May 2022 20:12  --------------------------------------------------------  IN: 487.5 mL / OUT: 965 mL / NET: -477.5 mL        CAPILLARY BLOOD GLUCOSE    CAPILLARY BLOOD GLUCOSE      POCT Blood Glucose.: 97 mg/dL (13 May 2022 16:05)      PHYSICAL EXAM:  Appearance: Normal, NAD  HEAD:  Normocephalic  EYES: PERRLA, conjunctiva and sclera clear  NECK: + ETT midline, Supple, No JVD  CHEST/LUNG: Clear to auscultation bilaterally; No wheezing  HEART: Normal S1, S2. No murmurs, rubs, or gallops  ABDOMEN: + Bowel sounds, Soft, NT, ND   EXTREMITIES:  2+ Peripheral Pulses, No clubbing, cyanosis, or edema  NEUROLOGY: intubated and sedated   Lymphatic: No lymphadenopathy  SKIN: No rashes or lesions      ============================I/O===========================   I&O's Detail    12 May 2022 07:01  -  13 May 2022 07:00  --------------------------------------------------------  IN:    Cryoprecipitate: 150 mL    Dexmedetomidine: 9 mL    DOBUTamine: 36 mL    DOBUTamine: 165 mL    Enteral Tube Flush: 60 mL    IV PiggyBack: 127.8 mL    IV PiggyBack: 1850 mL    Plasma: 300 mL    Platelets - Single Donor: 225 mL    Propofol: 188 mL    Vasopressin: 132.6 mL  Total IN: 3243.4 mL    OUT:    Indwelling Catheter - Urethral (mL): 705 mL    Rectal Tube (mL): 1400 mL  Total OUT: 2105 mL    Total NET: 1138.4 mL      13 May 2022 07:01  -  13 May 2022 20:12  --------------------------------------------------------  IN:    Dexmedetomidine: 21.9 mL    Dexmedetomidine: 3.7 mL    DOBUTamine: 72.6 mL    Enteral Tube Flush: 180 mL    Norepinephrine: 53.3 mL    Vasopressin: 66 mL    Vital1.5: 90 mL  Total IN: 487.5 mL    OUT:    Indwelling Catheter - Urethral (mL): 265 mL    Propofol: 0 mL    Rectal Tube (mL): 700 mL  Total OUT: 965 mL    Total NET: -477.5 mL        ============================ LABS =========================                        10.2   7.80  )-----------( 42       ( 13 May 2022 13:20 )             33.2         145  |  99  |  65<H>  ----------------------------<  121<H>  3.5   |  20<L>  |  4.99<H>    Ca    8.2<L>      13 May 2022 13:20  Phos  7.1       Mg     2.1         TPro  4.6<L>  /  Alb  3.2<L>  /  TBili  8.2<H>  /  DBili  x   /  AST  98649<H>  /  ALT  7109<H>  /  AlkPhos  123<H>      Troponin T, High Sensitivity Result: 807 ng/L (22 @ 14:38)  Troponin T, High Sensitivity Result: 597 ng/L (22 @ 04:39)  Troponin T, High Sensitivity Result: 283 ng/L (22 @ 22:39)  Troponin T, High Sensitivity Result: 99 ng/L (22 @ 18:15)    CKMB Units: 7.2 ng/mL (22 @ 04:39)  CKMB Units: 4.4 ng/mL (22 @ 22:39)    Creatine Kinase, Serum: 590 U/L (22 @ 14:38)  Creatine Kinase, Serum: 595 U/L (22 @ 04:39)  Creatine Kinase, Serum: 404 U/L (22 @ 22:39)    CPK Mass Assay %: 1.2 % (22 @ 04:39)  CPK Mass Assay %: 1.1 % (22 @ 22:39)        LIVER FUNCTIONS - ( 13 May 2022 13:20 )  Alb: 3.2 g/dL / Pro: 4.6 g/dL / ALK PHOS: 123 U/L / ALT: 7109 U/L / AST: 72013 U/L / GGT: x           PT/INR - ( 13 May 2022 00:56 )   PT: 35.7 sec;   INR: 3.07 ratio         PTT - ( 13 May 2022 00:56 )  PTT:48.9 sec  ABG - ( 13 May 2022 18:30 )  pH, Arterial: 7.42  pH, Blood: x     /  pCO2: 32    /  pO2: 119   / HCO3: 21    / Base Excess: -3.0  /  SaO2: 99.2              Blood Gas Arterial, Lactate: 6.2 mmol/L (22 @ 18:30)  Lactate, Blood: 6.2 mmol/L (22 @ 16:19)  Blood Gas Arterial, Lactate: 5.5 mmol/L (22 @ 13:05)  Blood Gas Arterial, Lactate: 6.5 mmol/L (22 @ 00:55)  Blood Gas Arterial, Lactate: 7.0 mmol/L (22 @ 18:40)  Blood Gas Venous - Lactate: 8.5 mmol/L (22 @ 15:13)  Blood Gas Arterial, Lactate: 8.4 mmol/L (22 @ 14:25)  Blood Gas Arterial, Lactate: 10.0 mmol/L (22 @ 08:37)  Blood Gas Arterial, Lactate: 11.9 mmol/L (22 @ 04:23)  Blood Gas Arterial, Lactate: 12.9 mmol/L (22 @ 02:42)  Blood Gas Arterial, Lactate: 12.7 mmol/L (22 @ 00:56)  Blood Gas Arterial, Lactate: 13.6 mmol/L (22 @ 23:39)  Lactate, Blood: 13.6 mmol/L (22 @ 22:39)  Blood Gas Arterial, Lactate: 13.0 mmol/L (22 @ 22:22)  Blood Gas Arterial, Lactate: 11.6 mmol/L (22 @ 18:45)  Blood Gas Venous - Lactate: 10.1 mmol/L (22 @ 17:25)    Urinalysis Basic - ( 11 May 2022 22:13 )    Color: Yellow / Appearance: Clear / S.021 / pH: x  Gluc: x / Ketone: Trace  / Bili: Negative / Urobili: <2 mg/dL   Blood: x / Protein: 30 mg/dL / Nitrite: Negative   Leuk Esterase: Negative / RBC: 141 /HPF / WBC 2 /HPF   Sq Epi: x / Non Sq Epi: 0 /HPF / Bacteria: Negative      ======================Micro/Rad/Cardio=================  Telemtry: Reviewed   EKG: Reviewed  CXR: Reviewed  Culture: Reviewed   Echo:   Cath: Cardiac Cath Lab - Adult:   [facility]  Department of Cardiology  [address] [place] [id number]  [phone]  Cath Lab Report -- Comprehensive Report  Patient: ALAN DE LA ROSA  Study date: 10/03/2014  Account number: [id number]  MR number:[id number]  : 1950  Gender: Male  Race: O  Case Physician(s):  King Souza M.D.  Referring Physician:  Steven Goldberg, M.D.  INDICATIONS: Angina/MI: myocardial infarction without ST elevation  (NSTEMI). Congestive heart failure with ischemic cardiomyopathy.  HISTORY: History includes ischemic cardiomyopathy. The patient has a  history of coronary artery disease. The patient has hypertension and oral  hypoglycemic-treated diabetes.  PROCEDURE:  --  Right heart catheterization.  --  Left heart catheterization with ventriculography.  --  Left coronary angiography.  --  Right coronary angiography.  TECHNIQUE: The risks and alternatives of the procedures and conscious  sedation were explained to the patient and informed consent was obtained.  Cardiac catheterization performed electively.  Local anesthetic given. Right femoral artery access. Right femoral vein  access. Right heart catheterization. The procedure was performed utilizing  a catheter. Left heart catheterization. Ventriculography was performed.  Left coronary artery angiography. The vessel was injected utilizing a  catheter. Right coronary artery angiography. The vessel was injected  utilizing a catheter. RADIATION EXPOSURE: 1.8 min.  CONTRAST GIVEN: Omnipaque 79 ml.  MEDICATIONS GIVEN: Midazolam, 1 mg, IV. Fentanyl, 25 mcg, IV.  VENTRICLES: EF calculated by contrast ventriculography was 20 % and EF  estimated was 20 %. Dilated LV with severe global LV hypokinesia  CORONARY VESSELS: The coronary circulation is right dominant.  LM:   --  LM: Normal.  LAD:   --  LAD: Angiography showed minor luminal irregularities with no  flow limiting lesions.  --  D1: There was a 75 % stenosis. Diffuse small caliber vessel  CX:   --  Distal circumflex: There was a 100 % stenosis. There was good  collateral blood supply to the distal myocardium. unchanged form 9 mos ago  --  OM1: Angiography showed minor luminal irregularities with no flow  limiting lesions. small diffuely diseased  RCA:   --  RCA: Angiography showed minor luminal irregularities with no  flow limiting lesions.  --  RPLS: There was a 99 % stenosis. diffsuely diseased  --  RV marginal 1: There was a 95 % stenosis. small vessel unchanged  COMPLICATIONS: There were no complications.  DIAGNOSTIC IMPRESSIONS: Patient has severe LV dysfunction, dilated LV with  distal coronary diseas that is unchanged from 9 months ago.  DIAGNOSTIC RECOMMENDATIONS: Medical and device therapy for dilated  cardiomyopathy and systolic heart failure.  Prepared and signed by  King Souza M.D.  Signed 10/03/2014 15:16:12  HEMODYNAMIC TABLES  Pressures:  Baseline/ Room Air  Pressures:  - HR: 59  Pressures:  - Rhythm:  Pressures:  -- Aortic Pressure (S/D/M): 134/60/69  Pressures:  -- Left Ventricle (s/edp): 130/29/--  Pressures:  -- Pulmonary Artery (S/D/M): 37/14/24  Pressures:  -- Pulmonary Capillary Wedge: 18/24/16  Pressures:  -- Right Atrium (a/v/M): 15/5/4  Pressures:  -- Right Ventricle (s/edp): 36/7/--  O2 Sats:  Baseline/ Room Air  O2 Sats:  - HR: 59  O2 Sats:  - Rhythm:  O2 Sats:  -- AO: 14.1/93.4/17.91  O2 Sats:  -- PA: 13.3/70.3/12.72  Outputs:  Baseline/ Room Air  Outputs:  -- CALCULATIONS[age]  Outputs:  -- CALCULATIONS: Body Surface Area: 1.81  Outputs:  -- CALCULATIONS: Height in cm: 165.00  Outputs:  -- CALCULATIONS: Sex: Male  Outputs:  -- CALCULATIONS: Weight in k.50  Outputs:  -- OUTPUTS: Blood Oxygen Difference: 5.19  Outputs:  -- OUTPUTS: CO by Kendall: 4.64  Outputs:  -- OUTPUTS: Kendall cardiac index: 2.57  Outputs:  -- OUTPUTS: O2 consumption: 241.00  Outputs:  -- OUTPUTS: Vo2 Indexed: 133.29  Outputs:  -- RESISTANCES: Left ventricular stroke work: 53.94  Outputs:  -- RESISTANCES: Left Ventricular Stroke Work index: 29.83  Outputs:  -- RESISTANCES: Pulmonary vascular index (dsc): 249.35  Outputs:  -- RESISTANCES: Pulmonary vascular index (Wood Units): 3.12  Outputs:  -- RESISTANCES: Pulmonary vascular resistance (dsc): 137.91  Outputs:  -- RESISTANCES: Pulmonary vascular resistance (Wood Units): 1.72  Outputs:  -- RESISTANCES: PVR_SVR Ratio: 0.12  Outputs:  -- RESISTANCES: Right ventricular stroke work: 22.53  Outputs:  -- RESISTANCES: Right ventricular stroke work index: 12.46  Outputs:  -- RESISTANCES: Systemic vascular index (dsc): 2025.99  Outputs:  -- RESISTANCES: Systemic vascular index (Wood Units): 25.33  Outputs:  -- RESISTANCES: Systemic vascular resistance (dsc): 1120.53  Outputs:  -- RESISTANCES: Systemic vascular resistance (Wood Units): 14.01  Outputs:  -- RESISTANCES: Total pulmonary index(dsc): 748.06  Outputs:  -- RESISTANCES: Total pulmonary index (Wood Units): 9.35  Outputs:  -- RESISTANCES: Total pulmonary resistance (dsc): 413.73  Outputs:  -- RESISTANCES: Total pulmonary resistance (Wood Units): 5.17  Outputs:  -- RESISTANCES: Total vascular index (Wood Units): 26.89  Outputs:  -- RESISTANCES: Total vascular resistance (dsc): 1189.48  Outputs:  -- RESISTANCES: Total vascular resistance (Wood Units): 14.87  Outputs:  -- RESISTANCES: Total vascular resistance index (dsc): 2150.67  Outputs:  -- RESISTANCES: TPR_TVR Ratio: 0.35  Outputs:  -- SHUNTS: Qs Indexed: 2.57  Outputs:  -- SHUNTS: Systemic flow: 4.64 (10-03-14 @ 12:17)    ======================================================  PAST MEDICAL & SURGICAL HISTORY:  Hypertension      Diabetes  A1C 6.8  on admission      Former smoker      HLD (hyperlipidemia)      CAD (coronary artery disease)  reports angiogram - 1 year ago - pt reports non obstructive  [facility]      CHF (congestive heart failure)  (EF 30%)      H/O fracture of wrist  and left ankle (ORIF ankle) following fall        ====================ASSESSMENT ==============  72 y/o M w/ CAD,CHF(EF 30%) s/p AICD, dm2 admitted w/ SOB and malaise hypotensive w/ elevated LA in setting of cardiogenic and distributive shock       Plan:  ====================== NEUROLOGY=====================  sedated w/ precedex gtt  - Daily sedation vacation   -placed on propofol overnight for comfort   - Transition to precedex for possible SBT today    dexMEDEtomidine Infusion 0.2 MICROgram(s)/kG/Hr (3.68 mL/Hr) IV Continuous <Continuous>    ==================== RESPIRATORY======================  Acute hypoxic respiratory failure   - On full vent support, requiring close monitoring of respiratory rate, breathing pattern, pulse oximetry monitoring, and intermittent blood gas analysis.   - attempt SBT today when MS improves     Mechanical Ventilation:  Mode: AC/ CMV (Assist Control/ Continuous Mandatory Ventilation)  RR (machine): 16  TV (machine): 450  FiO2: 30  PEEP: 5  ITime: 1  MAP: 10  PIP: 26      ====================CARDIOVASCULAR==================  Cardiogenic shock   -w/ mixed shock /w/ Cardiogenic Shock S/P impella  -s/p Impella removal   -Trending MVO2/lact/CMP, overnight MVO2 73.6% CO 6.1 CI 3.3   - Cont. @3 as per HF   --wean vasopressors for MAP > 65, c/w Levo and vaso   --CVP goal 8-10, overnight CVP 6-7, s/p LR bolus 250   - Hemodynamics q 12hrs, lactate still elevated at 6.5 overnight   --holding statin given transaminitis   --eventual ischemic w/up?  - Follow up w/ HF   - C/w ASA    aspirin  chewable 81 milliGRAM(s) Enteral Tube daily  DOBUTamine Infusion 3 MICROgram(s)/kG/Min (6.62 mL/Hr) IV Continuous <Continuous>  norepinephrine Infusion 0.05 MICROgram(s)/kG/Min (6.89 mL/Hr) IV Continuous <Continuous>  vasopressin Infusion 0.04 Unit(s)/Min (2.4 mL/Hr) IV Continuous <Continuous>    ===================HEMATOLOGIC/ONC ===================  coagulopathic and thrombocytopenia, likely 2/2 impella  --s/p cryo, ffp and platelets for line placements   --peripheral smear reviewed, unremarkable   --LDH downtrending, continue to monitor         ===================== RENAL =========================  ALISSA on CKD w/ unclear baseline Cr  I/O IN: 3243.4 mL / OUT: 1675 mL / NET: 1568.4 mL  - Renal consult called- non oliguric ALISSA  - Monitor BUN/CR and UO: Daughter addresses ok for CRRT if needed to give him a chance.       ==================== GASTROINTESTINAL===================  -as per nutrition vital AF @10cc/hr advanced as tolerated to 50ml x24hrs   - Start MVI     Elevated LFT in setting of shock liver w/ coagulopathy  - US of abd today  - Monitor LFT q 24hrs  - Hold hepatotoxic meds   - Continue Protonix for stress ulcer prophylaxis.     calcium gluconate IVPB 2 Gram(s) IV Intermittent once  multivitamin/minerals/iron Oral Solution (CENTRUM) 15 milliLiter(s) Oral daily  pantoprazole  Injectable 40 milliGRAM(s) IV Push two times a day    =======================    ENDOCRINE  =====================  DMT2  -  glycemic control with Admelog sliding scale and Glucagon PRN.   -Monitor blood glucose levels.     dextrose 50% Injectable 25 Gram(s) IV Push once  dextrose 50% Injectable 12.5 Gram(s) IV Push once  dextrose 50% Injectable 25 Gram(s) IV Push once  dextrose Oral Gel 15 Gram(s) Oral once  glucagon  Injectable 1 milliGRAM(s) IntraMuscular once  insulin lispro (ADMELOG) corrective regimen sliding scale   SubCutaneous three times a day before meals      ========================INFECTIOUS DISEASE================  Metapneumovirus +  - S/P. Vanco and cefepime, de-escalate if appropriate   -Vanco random level 21.1, holding vanco at this time   --f/u blood cultures   - wbc within normal limits     ========================MIS=========================  -Palliative care consult called as per family request     Patient requires continuous monitoring with bedside rhythm monitoring, pulse ox monitoring, and intermittent blood gas analysis. Care plan discussed with ICU care team. Patient remained critical and at risk for life threatening decompensation.  Patient seen, examined and plan discussed with CCU team during rounds.     I have personally provided __30__ minutes of critical care time excluding time spent on separate procedures, in addition to initial critical care time provided by the CICU Attending, .     By signing my name below, I, Carmella Zhao, attest that this documentation has been prepared under the direction and in the presence of Anjana Osei NP  Electronically signed: Nadia Suarez, 22 @ 20:12    I, Anjana Osei NP, personally performed the services described in this documentation. all medical record entries made by the scribe were at my direction and in my presence. I have reviewed the chart and agree that the record reflects my personal performance and is accurate and complete  Electronically signed: Anjana Osei NP

## 2022-05-13 NOTE — DIETITIAN INITIAL EVALUATION ADULT - OTHER CALCULATIONS
The [place] [employer] Equation ([employer]) 2003b was used to calculate resting energy expenditure: 1582kcal (5/13).  Defer fluid needs to MD.

## 2022-05-13 NOTE — CONSULT NOTE ADULT - CONSULT REQUESTED BY NAME
ED
Cardiology
Dr. Harris
primary team
CICU Team
Dr. Xiong (PCP: Dr. Steven Goldberg [facility])
Emergency Room
Kristina Harris

## 2022-05-13 NOTE — DIETITIAN INITIAL EVALUATION ADULT - PERTINENT MEDS FT
MEDICATIONS  (STANDING):  aspirin  chewable 81 milliGRAM(s) Enteral Tube daily  cefepime   IVPB 2000 milliGRAM(s) IV Intermittent every 12 hours  chlorhexidine 0.12% Liquid 15 milliLiter(s) Oral Mucosa every 12 hours  chlorhexidine 4% Liquid 1 Application(s) Topical <User Schedule>  dexMEDEtomidine Infusion 0.2 MICROgram(s)/kG/Hr (3.68 mL/Hr) IV Continuous <Continuous>  dextrose 50% Injectable 25 Gram(s) IV Push once  dextrose 50% Injectable 12.5 Gram(s) IV Push once  dextrose 50% Injectable 25 Gram(s) IV Push once  dextrose Oral Gel 15 Gram(s) Oral once  DOBUTamine Infusion 3 MICROgram(s)/kG/Min (6.62 mL/Hr) IV Continuous <Continuous>  furosemide   Injectable 80 milliGRAM(s) IV Push once  glucagon  Injectable 1 milliGRAM(s) IntraMuscular once  insulin lispro (ADMELOG) corrective regimen sliding scale   SubCutaneous three times a day before meals  pantoprazole  Injectable 40 milliGRAM(s) IV Push two times a day  propofol Infusion 30 MICROgram(s)/kG/Min (13.2 mL/Hr) IV Continuous <Continuous>  vancomycin  IVPB      vancomycin  IVPB 1000 milliGRAM(s) IV Intermittent every 24 hours  vasopressin Infusion 0.04 Unit(s)/Min (2.4 mL/Hr) IV Continuous <Continuous>    MEDICATIONS  (PRN):

## 2022-05-13 NOTE — CONSULT NOTE ADULT - REASON FOR ADMISSION
Cardiogenic Shock

## 2022-05-13 NOTE — CONSULT NOTE ADULT - SUBJECTIVE AND OBJECTIVE BOX
HPI:   History obtained from wife, daughter and charts.     Pt is a 71M with PMHx DMII, CHF (30%), AICD 1yr ago, HLD. He is a former smoker (quit approx 30 years ago). Pt received his second COVID 19 booster shot this week.     Per daughter, pt began having some new lower extremity edema last week and later developed a night time cough. His symptoms improved after a few days until today when his cough worsened and he became extremely tachypneic and short of breath. Daughter became very concerned and brought him to ED.     In the ED, pt was tachypneic, with systolic BP in the 70s maxed on levo. He was also on Bipap with RR 40s, TV 200s and not mentating well. Lactate was reportedly 11 and pH was 7. In the ED, he was started on milrinone and dobutamine and then shock team was activated. The patient was taken to cath lab and an Impella was placed.     Upon arrival to CICU, pt was on Dobutamine @10, Milrinone @.125, Vaso 0.08, Levo @ 1.0, intubated on PEEP 8 and fio2 100%   (11 May 2022 22:58)    PERTINENT PM/SXH:   Hypertension    Diabetes    Former smoker    MI (myocardial infarction)    HLD (hyperlipidemia)    CAD (coronary artery disease)    CHF (congestive heart failure)      H/O fracture of wrist      FAMILY HISTORY:  Family history of stroke  father  59 yr    Family history of diabetes mellitus  brother  complications of DM age 50 yr    Family history of heart attack  mother  57 yr      Family Hx substance abuse [ ]yes [ ]no  ITEMS NOT CHECKED ARE NOT PRESENT    SOCIAL HISTORY:   Significant other/partner[ ]  Children[ ]  Yazidism/Spirituality:  Substance hx:  [ ]   Tobacco hx:  [ ]   Alcohol hx: [ ]   Home Opioid hx:  [ ] I-Stop Reference No:  Living Situation: [ ]Home  [ ]Long term care  [ ]Rehab [ ]Other    ADVANCE DIRECTIVES:    DNR/MOLST  [ ]  Living Will  [ ]   DECISION MAKER(s):  [ ] Health Care Proxy(s)  [ ] Surrogate(s)  [ ] Guardian           Name(s): Phone Number(s):    BASELINE (I)ADL(s) (prior to admission):  [place]: [ ]Total  [ ] Moderate [ ]Dependent    Allergies    No Known Allergies    Intolerances    MEDICATIONS  (STANDING):  aspirin  chewable 81 milliGRAM(s) Enteral Tube daily  cefepime   IVPB 2000 milliGRAM(s) IV Intermittent every 12 hours  chlorhexidine 0.12% Liquid 15 milliLiter(s) Oral Mucosa every 12 hours  chlorhexidine 4% Liquid 1 Application(s) Topical <User Schedule>  dexMEDEtomidine Infusion 0.2 MICROgram(s)/kG/Hr (3.68 mL/Hr) IV Continuous <Continuous>  dextrose 50% Injectable 25 Gram(s) IV Push once  dextrose 50% Injectable 12.5 Gram(s) IV Push once  dextrose 50% Injectable 25 Gram(s) IV Push once  dextrose Oral Gel 15 Gram(s) Oral once  DOBUTamine Infusion 3 MICROgram(s)/kG/Min (6.62 mL/Hr) IV Continuous <Continuous>  glucagon  Injectable 1 milliGRAM(s) IntraMuscular once  insulin lispro (ADMELOG) corrective regimen sliding scale   SubCutaneous three times a day before meals  norepinephrine Infusion 0.05 MICROgram(s)/kG/Min (6.89 mL/Hr) IV Continuous <Continuous>  pantoprazole  Injectable 40 milliGRAM(s) IV Push two times a day  vancomycin  IVPB      vancomycin  IVPB 1000 milliGRAM(s) IV Intermittent every 24 hours  vasopressin Infusion 0.04 Unit(s)/Min (2.4 mL/Hr) IV Continuous <Continuous>    MEDICATIONS  (PRN):    PRESENT SYMPTOMS: [ ]Unable to self-report  [ ] CPOT [ ] PAINADs [ ] RDOS  Source if other than patient:  [ ]Family   [ ]Team     Pain: [ ]yes [ ]no  QOL impact -   Location -                    Aggravating factors -  Quality -  Radiation -  Timing-  Severity (0-10 scale):  Minimal acceptable level (0-10 scale):     CPOT:    https://www.sccm.org/getattachment/ykw70k74-2i5n-2z4q-8e1h-4966q1209b5e/Critical-Care-Pain-Observation-Tool-(CPOT)    PAIN AD Score:   http://geriatrictoolkit.[place].[facility]/cog/painad.pdf (press ctrl +  left click to view)    Dyspnea:                           [ ]Mild [ ]Moderate [ ]Severe      RDOS:  0 to 2  minimal or no respiratory distress   3  mild distress  4 to 6 moderate distress  >7 severe distress  https://[email or web address]/handouts/hen/Respiratory_Distress_Observation_Scale.pdf (Ctrl +  left click to view)     Anxiety:                             [ ]Mild [ ]Moderate [ ]Severe  Fatigue:                             [ ]Mild [ ]Moderate [ ]Severe  Nausea:                             [ ]Mild [ ]Moderate [ ]Severe  Loss of appetite:              [ ]Mild [ ]Moderate [ ]Severe  Constipation:                    [ ]Mild [ ]Moderate [ ]Severe    Other Symptoms:  [ ]All other review of systems negative     Palliative Performance Status Version 2:         %    http://[facility].org/files/news/palliative_performance_scale_ppsv2.pdf  PHYSICAL EXAM:  Vital Signs Last 24 Hrs  T(C): 36.5 (13 May 2022 11:00), Max: 37.1 (13 May 2022 03:00)  T(F): 97.7 (13 May 2022 11:00), Max: 98.8 (13 May 2022 03:00)  HR: 75 (13 May 2022 13:30) (64 - 83)  BP: --  BP(mean): --  RR: 16 (13 May 2022 13:30) (11 - 34)  SpO2: 99% (13 May 2022 13:30) (68% - 100%) I&O's Summary    12 May 2022 07:01  -  13 May 2022 07:00  --------------------------------------------------------  IN: 3243.4 mL / OUT: 2105 mL / NET: 1138.4 mL    13 May 2022 07:01  -  13 May 2022 13:47  --------------------------------------------------------  IN: 222.7 mL / OUT: 370 mL / NET: -147.3 mL      GENERAL: [ ]Cachexia    [ ]Alert  [ ]Oriented x   [ ]Lethargic  [ ]Unarousable  [ ]Verbal  [ ]Non-Verbal  Behavioral:   [ ] Anxiety  [ ] Delirium [ ] Agitation [ ] Other  HEENT:  [ ]Normal   [ ]Dry mouth   [ ]ET Tube/Trach  [ ]Oral lesions  PULMONARY:   [ ]Clear [ ]Tachypnea  [ ]Audible excessive secretions   [ ]Rhonchi        [ ]Right [ ]Left [ ]Bilateral  [ ]Crackles        [ ]Right [ ]Left [ ]Bilateral  [ ]Wheezing     [ ]Right [ ]Left [ ]Bilateral  [ ]Diminished breath sounds [ ]right [ ]left [ ]bilateral  CARDIOVASCULAR:    [ ]Regular [ ]Irregular [ ]Tachy  [ ]Gavin [ ]Murmur [ ]Other  GASTROINTESTINAL:  [ ]Soft  [ ]Distended   [ ]+BS  [ ]Non tender [ ]Tender  [ ]Other [ ]PEG [ ]OGT/ NGT  Last BM:  GENITOURINARY:  [ ]Normal [ ] Incontinent   [ ]Oliguria/Anuria   [ ][place]  MUSCULOSKELETAL:   [ ]Normal   [ ]Weakness  [ ]Bed/Wheelchair bound [ ]Edema  NEUROLOGIC:   [ ]No focal deficits  [ ]Cognitive impairment  [ ]Dysphagia [ ]Dysarthria [ ]Paresis [ ]Other   SKIN:   [ ]Normal  [ ]Rash  [ ]Other  [ ]Pressure ulcer(s)       Present on admission [ ]y [ ]n    CRITICAL CARE:  [ ] Shock Present  [ ]Septic [ ]Cardiogenic [ ]Neurologic [ ]Hypovolemic  [ ]  Vasopressors [ ]  Inotropes   [ ]Respiratory failure present [ ]Mechanical ventilation [ ]Non-invasive ventilatory support [ ]High flow  Mode: AC/ CMV (Assist Control/ Continuous Mandatory Ventilation), RR (machine): 16, TV (machine): 450, FiO2: 30, PEEP: 5, ITime: 1, MAP: 9, PIP: 27  [ ]Acute  [ ]Chronic [ ]Hypoxic  [ ]Hypercarbic [ ]Other  [ ]Other organ failure     LABS:                        10.2   7.80  )-----------( 42       ( 13 May 2022 13:20 )             33.2       144  |  99  |  57<H>  ----------------------------<  187<H>  3.8   |  21<L>  |  4.13<H>    Ca    8.2<L>      13 May 2022 00:56  Phos  7.0       Mg     2.1         TPro  4.3<L>  /  Alb  2.7<L>  /  TBili  6.6<H>  /  DBili  x   /  AST  01415<H>  /  ALT  7381<H>  /  AlkPhos  115    PT/INR - ( 13 May 2022 00:56 )   PT: 35.7 sec;   INR: 3.07 ratio         PTT - ( 13 May 2022 00:56 )  PTT:48.9 sec    Urinalysis Basic - ( 11 May 2022 22:13 )    Color: Yellow / Appearance: Clear / S.021 / pH: x  Gluc: x / Ketone: Trace  / Bili: Negative / Urobili: <2 mg/dL   Blood: x / Protein: 30 mg/dL / Nitrite: Negative   Leuk Esterase: Negative / RBC: 141 /HPF / WBC 2 /HPF   Sq Epi: x / Non Sq Epi: 0 /HPF / Bacteria: Negative      RADIOLOGY & ADDITIONAL STUDIES:    PROTEIN CALORIE MALNUTRITION PRESENT: [ ]mild [ ]moderate [ ]severe [ ]underweight [ ]morbid obesity  https://www.andeal.org/vault/2440/web/files/ONC/Table_Clinical%20Characteristics%20to%20Document%20Malnutrition-White%20JV%20et%20al%463134.pdf    Height (cm): 167.6 (22 @ 17:11)  Weight (kg): 73.5 (22 @ 17:11)  BMI (kg/m2): 26.2 (22 @ 17:11)    [ ]PPSV2 < or = to 30% [ ]significant weight loss  [ ]poor nutritional intake  [ ]anasarca[ ]Artificial Nutrition      REFERRALS:   [ ]Chaplaincy  [ ]Hospice  [ ]Child Life  [ ]Social Work  [ ]Case management [ ]Holistic Therapy     Goals of Care Document: CECY Roman (22 @ 00:54)  Goals of Care Conversation:   Participants:  · Participants  Family; Staff  · Spouse  Pramedai  · Child(isauro)  Josefina, Son, Daughter in law  · Provider  Yaritza Roman PA-C    Advance Directives:  · Does patient have Advance Directive  No  · Do you want to complete the HCP and name a [place] Care Agent  No  · Does Patient Have a Surrogate  Yes  · Surrogate's Name  Josefina  · Surrogate's Phone Number  [phone]  · Does the Patient have a Court Appointed Guardian ([id number])  No  · Caregiver:  no    Conversation Discussion:  · Conversation  Diagnosis; Prognosis; MOLST Discussed; Treatment Options  · Conversation Details  I discussed the patients prognosis and critical course in detail with the family. They expressed understanding that the patient is critically in and could demise overnight. The patient's wife asked her daughter, Josefina, to be in charge of decision making for the patient. She was in agreement as was her brother.     I discussed with the family that the patient is on very high doses of vasopressor medication and the critical care being provided. I asked the family if his heart were to stop if they would want us to perform CPR on the patient. The wife, daughter and son all stated they would not want CPR done at this time and would like to sign a DNR for now. They stated they will re-evaluate in the morning depending on how the patient does overnight if they would like to leave this in place. At this time, the family would like the patient to continue receiving all care as appropriate. They would not like to cap care or comfort care.     I discussed a MOLST form with the family and completed a DNR in the form with Josefina (daughter) per wife's wishes.     I provided emotional support to the family, explained the patients condition and treatments and will continue to make myself available for them.    Personal Advance Directives Treatment Guidelines:   Treatment Guidelines:  · Decision Maker  Surrogate  · Treatment Guidelines  DNR Order    MOLST:  · Completed  12-May-2022    Location of Discussion:   Time Spent on Advance Care Planning:  I spent 60 (in minutes) on advance care planning services with the patient.  This time is separate and distinct from any other care management services provided on this date.    Location of Discussion:  · Location of discussion  Face to face      Electronic Signatures:  Yaritza Roman)  (Signed 12-May-2022 01:00)  	Authored: Goals of Care Conversation, Personal Advance Directives Treatment Guidelines, Location of Discussion      Last Updated: 12-May-2022 01:00 by Yaritza Roman (PA)     HPI:  History obtained from wife, daughter and charts.     Pt is a 71M with PMHx DMII, CHF (30%), AICD 1yr ago, HLD. He is a former smoker (quit approx 30 years ago). Pt received his second COVID 19 booster shot this week.     Per daughter, pt began having some new lower extremity edema last week and later developed a night time cough. His symptoms improved after a few days until today when his cough worsened and he became extremely tachypneic and short of breath. Daughter became very concerned and brought him to ED.     In the ED, pt was tachypneic, with systolic BP in the 70s maxed on levo. He was also on Bipap with RR 40s, TV 200s and not mentating well. Lactate was reportedly 11 and pH was 7. In the ED, he was started on milrinone and dobutamine and then shock team was activated. The patient was taken to cath lab and an Impella was placed.     Upon arrival to CICU, pt was on Dobutamine @10, Milrinone @.125, Vaso 0.08, Levo @ 1.0, intubated on PEEP 8 and fio2 100%   (11 May 2022 22:58)    PERTINENT PM/SXH:   Hypertension    Diabetes    Former smoker    MI (myocardial infarction)    HLD (hyperlipidemia)    CAD (coronary artery disease)    CHF (congestive heart failure)    H/O fracture of wrist      FAMILY HISTORY:  Family history of stroke  father  59 yr    Family history of diabetes mellitus  brother  complications of DM age 50 yr    Family history of heart attack  mother  57 yr      ITEMS NOT CHECKED ARE NOT PRESENT    SOCIAL HISTORY:   Significant other/partner[x]  Children[x]  Cheondoism/Spirituality:  Substance hx:  [ ]   Tobacco hx:  [ ]   Alcohol hx: [ ]   Home Opioid hx:  [ ] I-Stop Reference No:  Living Situation: [x]Home  [ ]Long term care  [ ]Rehab [ ]Other    ADVANCE DIRECTIVES:    DNR/MOLST  [ ]  Living Will  [ ]   DECISION MAKER(s):  [ ] Health Care Proxy(s)  [x] Surrogate(s)  [ ] Guardian           Name(s): Phone Number(s):  Wife defers to daughter Amanada    BASELINE (I)ADL(s) (prior to admission):  [place]: [x]Total  [ ] Moderate [ ]Dependent    Allergies    No Known Allergies    Intolerances    MEDICATIONS  (STANDING):  aspirin  chewable 81 milliGRAM(s) Enteral Tube daily  cefepime   IVPB 2000 milliGRAM(s) IV Intermittent every 12 hours  chlorhexidine 0.12% Liquid 15 milliLiter(s) Oral Mucosa every 12 hours  chlorhexidine 4% Liquid 1 Application(s) Topical <User Schedule>  dexMEDEtomidine Infusion 0.2 MICROgram(s)/kG/Hr (3.68 mL/Hr) IV Continuous <Continuous>  dextrose 50% Injectable 25 Gram(s) IV Push once  dextrose 50% Injectable 12.5 Gram(s) IV Push once  dextrose 50% Injectable 25 Gram(s) IV Push once  dextrose Oral Gel 15 Gram(s) Oral once  DOBUTamine Infusion 3 MICROgram(s)/kG/Min (6.62 mL/Hr) IV Continuous <Continuous>  glucagon  Injectable 1 milliGRAM(s) IntraMuscular once  insulin lispro (ADMELOG) corrective regimen sliding scale   SubCutaneous three times a day before meals  norepinephrine Infusion 0.05 MICROgram(s)/kG/Min (6.89 mL/Hr) IV Continuous <Continuous>  pantoprazole  Injectable 40 milliGRAM(s) IV Push two times a day  vancomycin  IVPB      vancomycin  IVPB 1000 milliGRAM(s) IV Intermittent every 24 hours  vasopressin Infusion 0.04 Unit(s)/Min (2.4 mL/Hr) IV Continuous <Continuous>    MEDICATIONS  (PRN):    PRESENT SYMPTOMS: [ ]Unable to self-report  [x] CPOT [ ] PAINADs [x] RDOS  Source if other than patient:  [x]Family   [ ]Team     Pain: [ ]yes [x]no  QOL impact -   Location -                    Aggravating factors -  Quality -  Radiation -  Timing-  Severity (0-10 scale):  Minimal acceptable level (0-10 scale):     CPOT:  0  https://www.sccm.org/getattachment/[email or web address]-[email or web address]/Critical-Care-Pain-Observation-Tool-(CPOT)    PAIN AD Score:   http://geriatrictoolkit.[place].[facility]/cog/painad.pdf (press ctrl +  left click to view)    Dyspnea:                           [ ]Mild [ ]Moderate [ ]Severe      RDOS: 0  0 to 2  minimal or no respiratory distress   3  mild distress  4 to 6 moderate distress  >7 severe distress  https://[email or web address].net/handouts/hen/Respiratory_Distress_Observation_Scale.pdf (Ctrl +  left click to view)     Anxiety:                             [ ]Mild [ ]Moderate [ ]Severe  Fatigue:                             [ ]Mild [ ]Moderate [ ]Severe  Nausea:                             [ ]Mild [ ]Moderate [ ]Severe  Loss of appetite:              [ ]Mild [ ]Moderate [ ]Severe  Constipation:                    [ ]Mild [ ]Moderate [ ]Severe    Other Symptoms:  [x]All other review of systems unable to obtain due to poor mentation    Palliative Performance Status Version 2:      10   %    http://[facility].org/files/news/palliative_performance_scale_ppsv2.pdf  PHYSICAL EXAM:  Vital Signs Last 24 Hrs  T(C): 36.5 (13 May 2022 11:00), Max: 37.1 (13 May 2022 03:00)  T(F): 97.7 (13 May 2022 11:00), Max: 98.8 (13 May 2022 03:00)  HR: 75 (13 May 2022 13:30) (64 - 83)  BP: --  BP(mean): --  RR: 16 (13 May 2022 13:30) (11 - 34)  SpO2: 99% (13 May 2022 13:30) (68% - 100%) I&O's Summary    12 May 2022 07:01  -  13 May 2022 07:00  --------------------------------------------------------  IN: 3243.4 mL / OUT: 2105 mL / NET: 1138.4 mL    13 May 2022 07:01  -  13 May 2022 13:47  --------------------------------------------------------  IN: 222.7 mL / OUT: 370 mL / NET: -147.3 mL      GENERAL:  Intubated and sedated  [ ]Alert  [ ]Oriented x 3  [ ]Lethargic  [ ]Cachexia  [x]Unarousable  [ ]Verbal  []Non-Verbal  Behavioral:   [ ]Anxiety  [ ]Delirium [ ]Agitation [ ]Other  HEENT:  [ ]Normal   [ ]Dry mouth   [x]ET Tube/Trach  [ ]Oral lesions  PULMONARY:   [x]Clear [ ]Tachypnea  [ ]Audible excessive secretions   [ ]Rhonchi        [ ]Right [ ]Left [ ]Bilateral  [ ]Crackles        [ ]Right [ ]Left [ ]Bilateral  [ ]Wheezing     [ ]Right [ ]Left [ ]Bilateral  [ ]Diminished BS [ ] Right [ ]Left [ ]Bilateral  CARDIOVASCULAR:    [x]Regular [ ]Irregular [ ]Tachy  [ ]Gavin [ ]Murmur [ ]Other  GASTROINTESTINAL:  [x]Soft  [ ]Distended   [x]+BS  [x]Non tender [ ]Tender  [ ]PEG [ ]OGT/ NGT   Last BM:    GENITOURINARY:  [x]Normal [ ]Incontinent   [ ]Oliguria/Anuria   [x][place]  MUSCULOSKELETAL:   [ ]Normal   [x]Weakness  [ ]Bed/Wheelchair bound [ ]Edema  NEUROLOGIC:   [x]No focal deficits  [ ] Cognitive impairment  [ ] Dysphagia [ ]Dysarthria [ ] Paresis [ ]Other   SKIN:   [x]Normal  [ ]Rash   [ ]Pressure ulcer(s) [ ]y [ ]n present on admission  CRITICAL CARE:  [ ] Shock Present  [ ]Septic [x]Cardiogenic [ ]Neurologic [ ]Hypovolemic  [x]  Vasopressors [x]  Inotropes   [x]Respiratory failure present [x]Mechanical ventilation [ ]Non-invasive ventilatory support [ ]High flow  Mode: AC/ CMV (Assist Control/ Continuous Mandatory Ventilation), RR (machine): 16, TV (machine): 450, FiO2: 30, PEEP: 5, ITime: 1, MAP: 9, PIP: 27  [x]Acute  [ ]Chronic [x]Hypoxic  [ ]Hypercarbic [ ]Other  [ ]Other organ failure     LABS:                        10.2   7.80  )-----------( 42       ( 13 May 2022 13:20 )             33.2       144  |  99  |  57<H>  ----------------------------<  187<H>  3.8   |  21<L>  |  4.13<H>    Ca    8.2<L>      13 May 2022 00:56  Phos  7.0       Mg     2.1         TPro  4.3<L>  /  Alb  2.7<L>  /  TBili  6.6<H>  /  DBili  x   /  AST  93756<H>  /  ALT  7381<H>  /  AlkPhos  115    PT/INR - ( 13 May 2022 00:56 )   PT: 35.7 sec;   INR: 3.07 ratio         PTT - ( 13 May 2022 00:56 )  PTT:48.9 sec    Urinalysis Basic - ( 11 May 2022 22:13 )    Color: Yellow / Appearance: Clear / S.021 / pH: x  Gluc: x / Ketone: Trace  / Bili: Negative / Urobili: <2 mg/dL   Blood: x / Protein: 30 mg/dL / Nitrite: Negative   Leuk Esterase: Negative / RBC: 141 /HPF / WBC 2 /HPF   Sq Epi: x / Non Sq Epi: 0 /HPF / Bacteria: Negative      RADIOLOGY & ADDITIONAL STUDIES:    < from: Xray Chest 1 View- PORTABLE-Routine (Xray Chest 1 View- PORTABLE-Routine in AM.) (22 @ 04:39) >    ACC: [id number] EXAM:  XR CHEST PORTABLE ROUTINE 1V                          PROCEDURE DATE:  2022          INTERPRETATION:  Chest one view    HISTORY: Line placement    COMPARISON STUDY: 2022    Frontal expiratory view of the chest shows the heart to be similar in   size. Endotracheal tube, right jugular [place] catheter, left chest   wall defibrillator and nasogastric tube are present.    The lungs show progression of patchy right lung infiltrates with small   left effusion and there is no evidence of pneumothorax nor right pleural   effusion.    IMPRESSION:  Lines as noted. Progression of patchy right infiltrates.        Thank you for the courtesy of this referral.    --- End of Report ---      MARCI RESTREPO MD; Attending Interventional Radiologist  This document has been electronically signed. May 13 2022  2:17PM    < end of copied text >      PROTEIN CALORIE MALNUTRITION PRESENT: [ ]mild [ ]moderate [ ]severe [ ]underweight [ ]morbid obesity  https://www.andeal.org/vault/2440/web/files/ONC/Table_Clinical%20Characteristics%20to%20Document%20Malnutrition-White%20JV%20et%20al%2020.pdf    Height (cm): 167.6 (22 @ 17:11)  Weight (kg): 73.5 (22 @ 17:11)  BMI (kg/m2): 26.2 (22 @ 17:11)    [ ]PPSV2 < or = to 30% [ ]significant weight loss  [ ]poor nutritional intake  [ ]anasarca[ ]Artificial Nutrition      REFERRALS:   [ ]Chaplaincy  [ ]Hospice  [ ]Child Life  [ ]Social Work  [ ]Case management [ ]Holistic Therapy     Goals of Care Document:      Electronic Signatures:  Yaritza Roman (PA)  (Signed 12-May-2022 01:00)  	Authored: Goals of Care Conversation, Personal Advance Directives Treatment Guidelines, Location of Discussion      Last Updated: 12-May-2022 01:00 by Yaritza Roman (PA)

## 2022-05-13 NOTE — DIETITIAN INITIAL EVALUATION ADULT - ADD RECOMMEND
Add multivitamin if not otherwise contraindicated. Monitor GI tolerance to feeds, RD remains available to adjust TF regimen/formulary as needed/upon request. Continue to monitor nutritional intake, labs, weights, BM, skin, clinical course.

## 2022-05-14 NOTE — PROGRESS NOTE ADULT - SUBJECTIVE AND OBJECTIVE BOX
[facility] DIVISION OF KIDNEY DISEASES AND HYPERTENSION -- FOLLOW UP NOTE  --------------------------------------------------------------------------------  Chief Complaint: cardiogenic shock    24 hour events/subjective:  remains intubated        PAST HISTORY  --------------------------------------------------------------------------------  No significant changes to PMH, PSH, FHx, SHx, unless otherwise noted    ALLERGIES & MEDICATIONS  --------------------------------------------------------------------------------  Allergies    No Known Allergies    Intolerances      Standing Inpatient Medications  aspirin  chewable 81 milliGRAM(s) Enteral Tube daily  cefepime   IVPB 1000 milliGRAM(s) IV Intermittent every 24 hours  chlorhexidine 0.12% Liquid 15 milliLiter(s) Oral Mucosa every 12 hours  chlorhexidine 4% Liquid 1 Application(s) Topical <User Schedule>  CRRT Treatment    <Continuous>  dexMEDEtomidine Infusion 0.2 MICROgram(s)/kG/Hr IV Continuous <Continuous>  dextrose 50% Injectable 25 Gram(s) IV Push once  dextrose 50% Injectable 12.5 Gram(s) IV Push once  dextrose 50% Injectable 25 Gram(s) IV Push once  dextrose 50% Injectable 50 milliLiter(s) IV Push once  dextrose Oral Gel 15 Gram(s) Oral once  DOBUTamine Infusion 3 MICROgram(s)/kG/Min IV Continuous <Continuous>  glucagon  Injectable 1 milliGRAM(s) IntraMuscular once  heparin  Infusion Syringe 300 Unit(s)/Hr CRRT <Continuous>  insulin lispro (ADMELOG) corrective regimen sliding scale   SubCutaneous three times a day before meals  multivitamin/minerals/iron Oral Solution (CENTRUM) 15 milliLiter(s) Oral daily  norepinephrine Infusion 0.05 MICROgram(s)/kG/Min IV Continuous <Continuous>  pantoprazole  Injectable 40 milliGRAM(s) IV Push two times a day  PrismaSATE Dialysate BGK 4 / 2.5 5000 milliLiter(s) CRRT <Continuous>  PrismaSOL Filtration BGK 4 / 2.5 5000 milliLiter(s) CRRT <Continuous>  PrismaSOL Filtration BGK 4 / 2.5 5000 milliLiter(s) CRRT <Continuous>  propofol Infusion 10 MICROgram(s)/kG/Min IV Continuous <Continuous>  vasopressin Infusion 0.04 Unit(s)/Min IV Continuous <Continuous>    PRN Inpatient Medications      REVIEW OF SYSTEMS  --------------------------------------------------------------------------------  unable to obtain    VITALS/PHYSICAL EXAM  --------------------------------------------------------------------------------  T(C): 37.2 (05-14-22 @ 05:00), Max: 37.2 (05-14-22 @ 05:00)  HR: 82 (05-14-22 @ 08:45) (73 - 117)  BP: --  RR: 21 (05-14-22 @ 08:45) (15 - 34)  SpO2: 100% (05-14-22 @ 08:45) (81% - 100%)  Wt(kg): --        05-13-22 @ 07:01  -  05-14-22 @ 07:00  --------------------------------------------------------  IN: 2066.7 mL / OUT: 1340 mL / NET: 726.7 mL      Physical Exam:  PHYSICAL EXAM: vital signs as above  intubated, sedated  Neck: Supple, no JVD,    Lungs: no rhonchi, no wheeze, no crackles  CVS: S1 S2 no M/R/G  Abdomen: no tenderness, no organomegaly, BS presentt  Skin: warm, dry  Ext: no cyanosis or clubbing, no edema  Access:      LABS/STUDIES  --------------------------------------------------------------------------------              11.3   8.39  >-----------<  46       [05-14-22 @ 00:55]              36.8     146  |  100  |  74  ----------------------------<  76      [05-14-22 @ 00:55]  3.7   |  17  |  5.83        Ca     8.3     [05-14-22 @ 00:55]      Mg     2.2     [05-14-22 @ 00:55]      Phos  7.5     [05-14-22 @ 00:55]    TPro  4.6  /  Alb  3.3  /  TBili  9.2  /  DBili  x   /  AST  55944  /  ALT  6953  /  AlkPhos  151  [05-14-22 @ 00:55]    PT/INR: PT 40.8 , INR 3.47       [05-14-22 @ 00:55]  PTT: 45.3       [05-14-22 @ 00:55]          [05-12-22 @ 14:38]  LDH 57099      [05-12-22 @ 14:38]    Creatinine Trend:  SCr 5.83 [05-14 @ 00:55]  SCr 4.99 [05-13 @ 13:20]  SCr 4.13 [05-13 @ 00:56]  SCr 3.31 [05-12 @ 14:38]  SCr 2.94 [05-12 @ 08:59]    Urinalysis - [05-11-22 @ 22:13]      Color Yellow / Appearance Clear / SG 1.021 / pH 6.0      Gluc >= 1000 mg/dL / Ketone Trace  / Bili Negative / Urobili <2 mg/dL       Blood Large / Protein 30 mg/dL / Leuk Est Negative / Nitrite Negative       / WBC 2 / Hyaline 3 / Gran  / Sq Epi  / Non Sq Epi 0 / Bacteria Negative      TSH 4.14      [05-11-22 @ 22:39]  Lipid: chol 118, TG 77, HDL 19, LDL --      [05-11-22 @ 22:39]    HCV 0.07, Nonreact      [05-12-22 @ 01:06]     [facility] DIVISION OF KIDNEY DISEASES AND HYPERTENSION -- FOLLOW UP NOTE  --------------------------------------------------------------------------------  Chief Complaint: cardiogenic shock    24 hour events/subjective:  remains intubated  On pressors        PAST HISTORY  --------------------------------------------------------------------------------  No significant changes to PMH, PSH, FHx, SHx, unless otherwise noted    ALLERGIES & MEDICATIONS  --------------------------------------------------------------------------------  Allergies    No Known Allergies    Intolerances      Standing Inpatient Medications  aspirin  chewable 81 milliGRAM(s) Enteral Tube daily  cefepime   IVPB 1000 milliGRAM(s) IV Intermittent every 24 hours  chlorhexidine 0.12% Liquid 15 milliLiter(s) Oral Mucosa every 12 hours  chlorhexidine 4% Liquid 1 Application(s) Topical <User Schedule>  CRRT Treatment    <Continuous>  dexMEDEtomidine Infusion 0.2 MICROgram(s)/kG/Hr IV Continuous <Continuous>  dextrose 50% Injectable 25 Gram(s) IV Push once  dextrose 50% Injectable 12.5 Gram(s) IV Push once  dextrose 50% Injectable 25 Gram(s) IV Push once  dextrose 50% Injectable 50 milliLiter(s) IV Push once  dextrose Oral Gel 15 Gram(s) Oral once  DOBUTamine Infusion 3 MICROgram(s)/kG/Min IV Continuous <Continuous>  glucagon  Injectable 1 milliGRAM(s) IntraMuscular once  heparin  Infusion Syringe 300 Unit(s)/Hr CRRT <Continuous>  insulin lispro (ADMELOG) corrective regimen sliding scale   SubCutaneous three times a day before meals  multivitamin/minerals/iron Oral Solution (CENTRUM) 15 milliLiter(s) Oral daily  norepinephrine Infusion 0.05 MICROgram(s)/kG/Min IV Continuous <Continuous>  pantoprazole  Injectable 40 milliGRAM(s) IV Push two times a day  PrismaSATE Dialysate BGK 4 / 2.5 5000 milliLiter(s) CRRT <Continuous>  PrismaSOL Filtration BGK 4 / 2.5 5000 milliLiter(s) CRRT <Continuous>  PrismaSOL Filtration BGK 4 / 2.5 5000 milliLiter(s) CRRT <Continuous>  propofol Infusion 10 MICROgram(s)/kG/Min IV Continuous <Continuous>  vasopressin Infusion 0.04 Unit(s)/Min IV Continuous <Continuous>    PRN Inpatient Medications      REVIEW OF SYSTEMS  --------------------------------------------------------------------------------  unable to obtain    VITALS/PHYSICAL EXAM  --------------------------------------------------------------------------------  T(C): 37.2 (05-14-22 @ 05:00), Max: 37.2 (05-14-22 @ 05:00)  HR: 82 (05-14-22 @ 08:45) (73 - 117)  BP: --  RR: 21 (05-14-22 @ 08:45) (15 - 34)  SpO2: 100% (05-14-22 @ 08:45) (81% - 100%)  Wt(kg): --        05-13-22 @ 07:01  -  05-14-22 @ 07:00  --------------------------------------------------------  IN: 2066.7 mL / OUT: 1340 mL / NET: 726.7 mL      Physical Exam:  PHYSICAL EXAM: vital signs as above  intubated, sedated  Neck: Supple, no JVD,    Lungs: no rhonchi, no wheeze, no crackles  CVS: S1 S2 no M/R/G  Abdomen: no tenderness, no organomegaly, BS presentt  Skin: warm, dry  Ext: no cyanosis or clubbing, no edema  Access:      LABS/STUDIES  --------------------------------------------------------------------------------              11.3   8.39  >-----------<  46       [05-14-22 @ 00:55]              36.8     146  |  100  |  74  ----------------------------<  76      [05-14-22 @ 00:55]  3.7   |  17  |  5.83        Ca     8.3     [05-14-22 @ 00:55]      Mg     2.2     [05-14-22 @ 00:55]      Phos  7.5     [05-14-22 @ 00:55]    TPro  4.6  /  Alb  3.3  /  TBili  9.2  /  DBili  x   /  AST  91789  /  ALT  6953  /  AlkPhos  151  [05-14-22 @ 00:55]    PT/INR: PT 40.8 , INR 3.47       [05-14-22 @ 00:55]  PTT: 45.3       [05-14-22 @ 00:55]          [05-12-22 @ 14:38]  LDH 03078      [05-12-22 @ 14:38]    Creatinine Trend:  SCr 5.83 [05-14 @ 00:55]  SCr 4.99 [05-13 @ 13:20]  SCr 4.13 [05-13 @ 00:56]  SCr 3.31 [05-12 @ 14:38]  SCr 2.94 [05-12 @ 08:59]    Urinalysis - [05-11-22 @ 22:13]      Color Yellow / Appearance Clear / SG 1.021 / pH 6.0      Gluc >= 1000 mg/dL / Ketone Trace  / Bili Negative / Urobili <2 mg/dL       Blood Large / Protein 30 mg/dL / Leuk Est Negative / Nitrite Negative       / WBC 2 / Hyaline 3 / Gran  / Sq Epi  / Non Sq Epi 0 / Bacteria Negative      TSH 4.14      [05-11-22 @ 22:39]  Lipid: chol 118, TG 77, HDL 19, LDL --      [05-11-22 @ 22:39]    HCV 0.07, Nonreact      [05-12-22 @ 01:06]

## 2022-05-14 NOTE — PROGRESS NOTE ADULT - SUBJECTIVE AND OBJECTIVE BOX
ALAN DE LA ROSA  MRN-[id number]  Patient is a 71y old  Male who presents with a chief complaint of Cardiogenic Shock (14 May 2022 14:54)    HPI:   History obtained from wife, daughter and charts.     Pt is a 71M with PMHx DMII, CHF (30%), AICD 1yr ago, HLD. He is a former smoker (quit approx 30 years ago). Pt received his second COVID 19 booster shot this week.     Per daughter, pt began having some new lower extremity edema last week and later developed a night time cough. His symptoms improved after a few days until today when his cough worsened and he became extremely tachypneic and short of breath. Daughter became very concerned and brought him to ED.     In the ED, pt was tachypneic, with systolic BP in the 70s maxed on levo. He was also on Bipap with RR 40s, TV 200s and not mentating well. Lactate was reportedly 11 and pH was 7. In the ED, he was started on milrinone and dobutamine and then shock team was activated. The patient was taken to cath lab and an Impella was placed.     Upon arrival to CICU, pt was on Dobutamine @10, Milrinone @.125, Vaso 0.08, Levo @ 1.0, intubated on PEEP 8 and fio2 100%   (11 May 2022 22:58)      Hospital Course:  2022 Transferred to CCU for further management      24 HOUR EVENTS:    REVIEW OF SYSTEMS:    CONSTITUTIONAL: No weakness, fevers or chills  EYES/ENT: No visual changes;  No vertigo or throat pain   NECK: No pain or stiffness  RESPIRATORY: No cough, wheezing, hemoptysis; No shortness of breath  CARDIOVASCULAR: No chest pain or palpitations  GASTROINTESTINAL: No abdominal or epigastric pain. No nausea, vomiting, or hematemesis; No diarrhea or constipation. No melena or hematochezia.  GENITOURINARY: No dysuria, frequency or hematuria  NEUROLOGICAL: No numbness or weakness  SKIN: No itching, rashes      ICU Vital Signs Last 24 Hrs  T(C): 36.8 (14 May 2022 21:00), Max: 37.2 (14 May 2022 05:00)  T(F): 98.2 (14 May 2022 21:00), Max: 99 (14 May 2022 05:00)  HR: 80 (14 May 2022 22:30) (76 - 110)  ABP: 113/50 (14 May 2022 22:30) (80/45 - 139/58)  ABP(mean): 67 (14 May 2022 22:30) (55 - 83)  RR: 21 (14 May 2022 22:30) (16 - 31)  SpO2: 98% (14 May 2022 22:30) (81% - 100%)    Mode: AC/ CMV (Assist Control/ Continuous Mandatory Ventilation), RR (machine): 16, TV (machine): 450, FiO2: 30, PEEP: 5, ITime: 1  CVP(mm Hg): 7 (22 @ 22:30) (0 - 20)    PA: 46/10 (22 @ 22:30) (23/8 - 65/20)  PA(mean): 26 (22 @ 22:30) (15 - 44)    14 May 2022 07:01  -  14 May 2022 23:35  --------------------------------------------------------  IN: 2639.1 mL / OUT: 2723 mL / NET: -83.9 mL    CAPILLARY BLOOD GLUCOSE      POCT Blood Glucose.: 93 mg/dL (14 May 2022 21:37)    PHYSICAL EXAM:  GENERAL: No acute distress, well-developed  HEAD:  Atraumatic, Normocephalic  EYES: EOMI, PERRLA, conjunctiva and sclera clear  NECK: Supple, no lymphadenopathy, no JVD  CHEST/LUNG: CTAB; No wheezes, rales, or rhonchi  HEART: Regular rate and rhythm. Normal S1/S2. No murmurs, rubs, or gallops  ABDOMEN: Soft, non-tender, non-distended; normal bowel sounds, no organomegaly  EXTREMITIES:  2+ peripheral pulses b/l, No clubbing, cyanosis, or edema  NEUROLOGY: A&O x 3, no focal deficits  SKIN: No rashes or lesions    ============================I/O===========================   I&O's Detail    13 May 2022 07:01  -  14 May 2022 07:00  --------------------------------------------------------  IN:    Dexmedetomidine: 21.9 mL    Dexmedetomidine: 3.7 mL    DOBUTamine: 160.2 mL    Enteral Tube Flush: 180 mL    IV PiggyBack: 500 mL    Norepinephrine: 438.9 mL    Propofol: 58 mL    Vasopressin: 144 mL    Vital1.5: 560 mL  Total IN: 2066.7 mL    OUT:    Indwelling Catheter - Urethral (mL): 440 mL    Propofol: 0 mL    Rectal Tube (mL): 900 mL  Total OUT: 1340 mL    Total NET: 726.7 mL      14 May 2022 07:01  -  14 May 2022 23:35  --------------------------------------------------------  IN:    dextrose 10%: 120 mL    DOBUTamine: 99 mL    IV PiggyBack: 300 mL    Norepinephrine: 718.6 mL    Other (mL): 700 mL    Plasma: 300 mL    Platelets - Single Donor: 225 mL    Propofol: 46.5 mL    Vasopressin: 90 mL    Vital1.5: 40 mL  Total IN: 2639.1 mL    OUT:    Indwelling Catheter - Urethral (mL): 160 mL    Nasogastric/Oral tube (mL): 700 mL    Other (mL): 713 mL    Other (mL): 700 mL    Rectal Tube (mL): 450 mL  Total OUT: 2723 mL    Total NET: -83.9 mL    ============================ LABS =========================                        11.3   8.39  )-----------( 46       ( 14 May 2022 00:55 )             36.8     05-14    145  |  103  |  71<H>  ----------------------------<  78  3.6   |  17<L>  |  5.69<H>    Ca    7.5<L>      14 May 2022 17:34  Phos  5.8       Mg     2.0         TPro  4.4<L>  /  Alb  3.1<L>  /  TBili  9.4<H>  /  DBili  x   /  AST  5831<H>  /  ALT  5005<H>  /  AlkPhos  160<H>      Troponin T, High Sensitivity Result: 807 ng/L (22 @ 14:38)  Troponin T, High Sensitivity Result: 597 ng/L (22 @ 04:39)    CKMB Units: 7.2 ng/mL (22 @ 04:39)    Creatine Kinase, Serum: 590 U/L (22 @ 14:38)  Creatine Kinase, Serum: 595 U/L (22 @ 04:39)    CPK Mass Assay %: 1.2 % (22 @ 04:39)      LIVER FUNCTIONS - ( 14 May 2022 17:34 )  Alb: 3.1 g/dL / Pro: 4.4 g/dL / ALK PHOS: 160 U/L / ALT: 5005 U/L / AST: 5831 U/L / GGT: x           PT/INR - ( 14 May 2022 00:55 )   PT: 40.8 sec;   INR: 3.47 ratio         PTT - ( 14 May 2022 00:55 )  PTT:45.3 sec  ABG - ( 14 May 2022 06:08 )  pH, Arterial: 7.37  pH, Blood: x     /  pCO2: 32    /  pO2: 94    / HCO3: 18    / Base Excess: -5.9  /  SaO2: 97.7        Lactate, Blood: 7.0 mmol/L (22 @ 17:34)  Blood Gas Venous - Lactate: 8.1 mmol/L (22 @ 14:41)  Lactate, Blood: 7.1 mmol/L (22 @ 09:39)  Blood Gas Arterial, Lactate: 6.4 mmol/L (22 @ 06:08)  Blood Gas Arterial, Lactate: 6.5 mmol/L (22 @ 00:47)    ======================Micro/Rad/Cardio=================  Telemtry: Reviewed   EKG: Reviewed  CXR: Reviewed  Culture: Reviewed   Echo:   Cath: Cardiac Cath Lab - Adult:   [facility]  Department of Cardiology  [address] [place] [address]  [phone]  Cath Lab Report -- Comprehensive Report  Patient: ALAN DE LA ROSA  Study date: 10/03/2014  Account number: [id number]  MR number:[id number]  : 1950  Gender: Male  Race: O  Case Physician(s):  King Souza M.D.  Referring Physician:  Steven Goldberg, M.D.  INDICATIONS: Angina/MI: myocardial infarction without ST elevation  (NSTEMI). Congestive heart failure with ischemic cardiomyopathy.  HISTORY: History includes ischemic cardiomyopathy. The patient has a  history of coronary artery disease. The patient has hypertension and oral  hypoglycemic-treated diabetes.  PROCEDURE:  --  Right heart catheterization.  --  Left heart catheterization with ventriculography.  --  Left coronary angiography.  --  Right coronary angiography.  TECHNIQUE: The risks and alternatives of the procedures and conscious  sedation were explained to the patient and informed consent was obtained.  Cardiac catheterization performed electively.  Local anesthetic given. Right femoral artery access. Right femoral vein  access. Right heart catheterization. The procedure was performed utilizing  a catheter. Left heart catheterization. Ventriculography was performed.  Left coronary artery angiography. The vessel was injected utilizing a  catheter. Right coronary artery angiography. The vessel was injected  utilizing a catheter. RADIATION EXPOSURE: 1.8 min.  CONTRAST GIVEN: Omnipaque 79 ml.  MEDICATIONS GIVEN: Midazolam, 1 mg, IV. Fentanyl, 25 mcg, IV.  VENTRICLES: EF calculated by contrast ventriculography was 20 % and EF  estimated was 20 %. Dilated LV with severe global LV hypokinesia  CORONARY VESSELS: The coronary circulation is right dominant.  LM:   --  LM: Normal.  LAD:   --  LAD: Angiography showed minor luminal irregularities with no  flow limiting lesions.  --  D1: There was a 75 % stenosis. Diffuse small caliber vessel  CX:   --  Distal circumflex: There was a 100 % stenosis. There was good  collateral blood supply to the distal myocardium. unchanged form 9 mos ago  --  OM1: Angiography showed minor luminal irregularities with no flow  limiting lesions. small diffuely diseased  RCA:   --  RCA: Angiography showed minor luminal irregularities with no  flow limiting lesions.  --  RPLS: There was a 99 % stenosis. diffsuely diseased  --  RV marginal 1: There was a 95 % stenosis. small vessel unchanged  COMPLICATIONS: There were no complications.  DIAGNOSTIC IMPRESSIONS: Patient has severe LV dysfunction, dilated LV with  distal coronary diseas that is unchanged from 9 months ago.  DIAGNOSTIC RECOMMENDATIONS: Medical and device therapy for dilated  cardiomyopathy and systolic heart failure.  Prepared and signed by  King Souza M.D.  Signed 10/03/2014 15:16:12  HEMODYNAMIC TABLES  Pressures:  Baseline/ Room Air  Pressures:  - HR: 59  Pressures:  - Rhythm:  Pressures:  -- Aortic Pressure (S/D/M): 134/60/69  Pressures:  -- Left Ventricle (s/edp): 130/29/--  Pressures:  -- Pulmonary Artery (S/D/M): 37/14/24  Pressures:  -- Pulmonary Capillary Wedge: 18/24/16  Pressures:  -- Right Atrium (a/v/M): 15/5/4  Pressures:  -- Right Ventricle (s/edp): 36/7/--  O2 Sats:  Baseline/ Room Air  O2 Sats:  - HR: 59  O2 Sats:  - Rhythm:  O2 Sats:  -- AO: 14.1/93.4/17.91  O2 Sats:  -- PA: 13.3/70.3/12.72  Outputs:  Baseline/ Room Air      ======================================================  PAST MEDICAL & SURGICAL HISTORY:  Hypertension      Diabetes  A1C 6.8  on admission      Former smoker      HLD (hyperlipidemia)      CAD (coronary artery disease)  reports angiogram - 1 year ago - pt reports non obstructive  at [facility]      CHF (congestive heart failure)  (EF 30%)      H/O fracture of wrist  and left ankle (ORIF ankle) following fall        ====================ASSESSMENT ==============  72 y/o M w/ CAD,CHF(EF 30%) s/p AICD, dm2 admitted w/ SOB and malaise hypotensive w/ elevated LA in setting of cardiogenic and distributive shock               Plan:  ====================== NEUROLOGY=====================  --continue propofol, RASS goal 0 to -1  --daily sedation vacations     propofol Infusion 10 MICROgram(s)/kG/Min (4.41 mL/Hr) IV Continuous <Continuous>    ==================== RESPIRATORY======================  acute hypoxic respiratory failure   --continue ventilator support  --defer SBT given hemodynamic instability   --wean fio2 as tolerated   --vent bundle    Mechanical Ventilation:  Mode: AC/ CMV (Assist Control/ Continuous Mandatory Ventilation)  RR (machine): 16  TV (machine): 450  FiO2: 30  PEEP: 5  ITime: 1  MAP: 11  PIP: 27      ====================CARDIOVASCULAR==================  mixed shock  --continue dobutamine for inotropic support, wean as able   --wean vasopressors for map > 65  --trend end organ perfusion, lactate, cardiac indices   --TTE on  with EF 5%, consider repeating?    CAD  --continue ASA  --cardiac enzymes downtrended, no need to follow    HLD  --hold statin for transaminitis      aspirin  chewable 81 milliGRAM(s) Enteral Tube daily  DOBUTamine Infusion 3 MICROgram(s)/kG/Min (6.62 mL/Hr) IV Continuous <Continuous>  norepinephrine Infusion 0.05 MICROgram(s)/kG/Min (6.89 mL/Hr) IV Continuous <Continuous>  vasopressin Infusion 0.04 Unit(s)/Min (2.4 mL/Hr) IV Continuous <Continuous>  ===================HEMATOLOGIC/ONC ===================  thrombocytopenia   --platelets slowly improving  --will give 1 platelet for line placement  --heme/onc consult, smear unremarkable, felt to be 2/2 hemolysis     acute illness coagulopathy  --INR elevated, likely in setting of shock liver  --will transfuse FFP x 1 for line placement      heparin  Infusion Syringe 300 Unit(s)/Hr (0.6 mL/Hr) CRRT <Continuous>    ===================== RENAL =========================      ==================== GASTROINTESTINAL===================  ALISSA (as per family no history of CKD), non-oliguric   --Cr 5.8 today, will initiate CRRT for clearance  --avoid nephrotoxic medications  --renal recs appreciated  --continue to follow renal function  --continue monitoring urine output    dextrose 10%. 1000 milliLiter(s) (30 mL/Hr) IV Continuous <Continuous>  multivitamin/minerals/iron Oral Solution (CENTRUM) 15 milliLiter(s) Oral daily  pantoprazole  Injectable 40 milliGRAM(s) IV Push two times a day    =======================    ENDOCRINE  =====================    dextrose 50% Injectable 25 Gram(s) IV Push once  dextrose 50% Injectable 12.5 Gram(s) IV Push once  dextrose 50% Injectable 25 Gram(s) IV Push once  dextrose Oral Gel 15 Gram(s) Oral once  glucagon  Injectable 1 milliGRAM(s) IntraMuscular once  insulin lispro (ADMELOG) corrective regimen sliding scale   SubCutaneous every 4 hours      ========================INFECTIOUS DISEASE================  cefepime   IVPB 1000 milliGRAM(s) IV Intermittent every 24 hours      Patient requires continuous monitoring with bedside rhythm monitoring, pulse ox monitoring, and intermittent blood gas analysis. Care plan discussed with ICU care team. Patient remained critical and at risk for life threatening decompensation.  Patient seen, examined and plan discussed with CCU team during rounds.     I have personally provided ____ minutes of critical care time excluding time spent on separate procedures, in addition to initial critical care time provided by the CICU Attending, Dr. Harris. lambert/ Shabnam/ Inga/ Kimberly.     By signing my name below, I, Horacio Whitley, attest that this documentation has been prepared under the direction and in the presence of Anjana Osei NP    Electronically signed: Nadia Ramesh, 22 @ 23:35    I, Anjana Osei NP, personally performed the services described in this documentation. all medical record entries made by the eduaribe were at my direction and in my presence. I have reviewed the chart and agree that the record reflects my personal performance and is accurate and complete  Electronically signed: Anjana Osei NP         ALAN DE LA ROSA  MRN-[id number]  Patient is a 71y old  Male who presents with a chief complaint of Cardiogenic Shock (14 May 2022 14:54)    HPI:   History obtained from wife, daughter and charts.     Pt is a 71M with PMHx DMII, CHF (30%), AICD 1yr ago, HLD. He is a former smoker (quit approx 30 years ago). Pt received his second COVID 19 booster shot this week.     Per daughter, pt began having some new lower extremity edema last week and later developed a night time cough. His symptoms improved after a few days until today when his cough worsened and he became extremely tachypneic and short of breath. Daughter became very concerned and brought him to ED.     In the ED, pt was tachypneic, with systolic BP in the 70s maxed on levo. He was also on Bipap with RR 40s, TV 200s and not mentating well. Lactate was reportedly 11 and pH was 7. In the ED, he was started on milrinone and dobutamine and then shock team was activated. The patient was taken to cath lab and an Impella was placed.     Upon arrival to CICU, pt was on Dobutamine @10, Milrinone @.125, Vaso 0.08, Levo @ 1.0, intubated on PEEP 8 and fio2 100%   (11 May 2022 22:58)      Hospital Course:  2022 Transferred to CCU for further management      24 HOUR EVENTS:  Started CVVHD today, removal 0ml/hr    REVIEW OF SYSTEMS:  Intubated and sedated       ICU Vital Signs Last 24 Hrs  T(C): 36.8 (14 May 2022 21:00), Max: 37.2 (14 May 2022 05:00)  T(F): 98.2 (14 May 2022 21:00), Max: 99 (14 May 2022 05:00)  HR: 80 (14 May 2022 22:30) (76 - 110)  ABP: 113/50 (14 May 2022 22:30) (80/45 - 139/58)  ABP(mean): 67 (14 May 2022 22:30) (55 - 83)  RR: 21 (14 May 2022 22:30) (16 - 31)  SpO2: 98% (14 May 2022 22:30) (81% - 100%)    Mode: AC/ CMV (Assist Control/ Continuous Mandatory Ventilation), RR (machine): 16, TV (machine): 450, FiO2: 30, PEEP: 5, ITime: 1  CVP(mm Hg): 7 (22 @ 22:30) (0 - 20)    PA: 46/10 (22 @ 22:30) (23/8 - 65/20)  PA(mean): 26 (22 @ 22:30) (15 - 44)    14 May 2022 07:01  -  14 May 2022 23:35  --------------------------------------------------------  IN: 2639.1 mL / OUT: 2723 mL / NET: -83.9 mL    CAPILLARY BLOOD GLUCOSE      POCT Blood Glucose.: 93 mg/dL (14 May 2022 21:37)    PHYSICAL EXAM:  GENERAL: No acute distress, well-developed  HEAD:  Atraumatic, Normocephalic  EYES: EOMI, PERRLA, conjunctiva and sclera clear  NECK: Supple, no lymphadenopathy, no JVD  CHEST/LUNG: CTAB; No wheezes, rales, or rhonchi  HEART: Regular rate and rhythm. Normal S1/S2. No murmurs, rubs, or gallops  ABDOMEN: Soft, non-tender, non-distended; normal bowel sounds, no organomegaly  EXTREMITIES:  2+ peripheral pulses b/l, No clubbing, cyanosis, or edema  NEUROLOGY: sedated and intubated   SKIN: No rashes or lesions    ============================I/O===========================   I&O's Detail    13 May 2022 07:01  -  14 May 2022 07:00  --------------------------------------------------------  IN:    Dexmedetomidine: 21.9 mL    Dexmedetomidine: 3.7 mL    DOBUTamine: 160.2 mL    Enteral Tube Flush: 180 mL    IV PiggyBack: 500 mL    Norepinephrine: 438.9 mL    Propofol: 58 mL    Vasopressin: 144 mL    Vital1.5: 560 mL  Total IN: 2066.7 mL    OUT:    Indwelling Catheter - Urethral (mL): 440 mL    Propofol: 0 mL    Rectal Tube (mL): 900 mL  Total OUT: 1340 mL    Total NET: 726.7 mL      14 May 2022 07:01  -  14 May 2022 23:35  --------------------------------------------------------  IN:    dextrose 10%: 120 mL    DOBUTamine: 99 mL    IV PiggyBack: 300 mL    Norepinephrine: 718.6 mL    Other (mL): 700 mL    Plasma: 300 mL    Platelets - Single Donor: 225 mL    Propofol: 46.5 mL    Vasopressin: 90 mL    Vital1.5: 40 mL  Total IN: 2639.1 mL    OUT:    Indwelling Catheter - Urethral (mL): 160 mL    Nasogastric/Oral tube (mL): 700 mL    Other (mL): 713 mL    Other (mL): 700 mL    Rectal Tube (mL): 450 mL  Total OUT: 2723 mL    Total NET: -83.9 mL    ============================ LABS =========================                        11.3   8.39  )-----------( 46       ( 14 May 2022 00:55 )             36.8     05-14    145  |  103  |  71<H>  ----------------------------<  78  3.6   |  17<L>  |  5.69<H>    Ca    7.5<L>      14 May 2022 17:34  Phos  5.8     -  Mg     2.0     -    TPro  4.4<L>  /  Alb  3.1<L>  /  TBili  9.4<H>  /  DBili  x   /  AST  5831<H>  /  ALT  5005<H>  /  AlkPhos  160<H>      Troponin T, High Sensitivity Result: 807 ng/L (22 @ 14:38)  Troponin T, High Sensitivity Result: 597 ng/L (22 @ 04:39)    CKMB Units: 7.2 ng/mL (22 @ 04:39)    Creatine Kinase, Serum: 590 U/L (22 @ 14:38)  Creatine Kinase, Serum: 595 U/L (22 @ 04:39)    CPK Mass Assay %: 1.2 % (22 @ 04:39)      LIVER FUNCTIONS - ( 14 May 2022 17:34 )  Alb: 3.1 g/dL / Pro: 4.4 g/dL / ALK PHOS: 160 U/L / ALT: 5005 U/L / AST: 5831 U/L / GGT: x           PT/INR - ( 14 May 2022 00:55 )   PT: 40.8 sec;   INR: 3.47 ratio         PTT - ( 14 May 2022 00:55 )  PTT:45.3 sec  ABG - ( 14 May 2022 06:08 )  pH, Arterial: 7.37  pH, Blood: x     /  pCO2: 32    /  pO2: 94    / HCO3: 18    / Base Excess: -5.9  /  SaO2: 97.7        Lactate, Blood: 7.0 mmol/L (22 @ 17:34)  Blood Gas Venous - Lactate: 8.1 mmol/L (22 @ 14:41)  Lactate, Blood: 7.1 mmol/L (22 @ 09:39)  Blood Gas Arterial, Lactate: 6.4 mmol/L (22 @ 06:08)  Blood Gas Arterial, Lactate: 6.5 mmol/L (22 @ 00:47)    ======================Micro/Rad/Cardio=================  Telemtry: Reviewed   EKG: Reviewed  CXR: Reviewed  Culture: Reviewed   Echo:   Cath: Cardiac Cath Lab - Adult:   [facility]  Department of Cardiology  [address] [place] [address] [phone]  Cath Lab Report -- Comprehensive Report  Patient: ALAN DE LA ROSA  Study date: 10/03/2014  Account number: [id number]  MR number:[id number]  : 1950  Gender: Male  Race: O  Case Physician(s):  King Souza M.D.  Referring Physician:  Steven Goldberg, M.D.  INDICATIONS: Angina/MI: myocardial infarction without ST elevation  (NSTEMI). Congestive heart failure with ischemic cardiomyopathy.  HISTORY: History includes ischemic cardiomyopathy. The patient has a  history of coronary artery disease. The patient has hypertension and oral  hypoglycemic-treated diabetes.  PROCEDURE:  --  Right heart catheterization.  --  Left heart catheterization with ventriculography.  --  Left coronary angiography.  --  Right coronary angiography.  TECHNIQUE: The risks and alternatives of the procedures and conscious  sedation were explained to the patient and informed consent was obtained.  Cardiac catheterization performed electively.  Local anesthetic given. Right femoral artery access. Right femoral vein  access. Right heart catheterization. The procedure was performed utilizing  a catheter. Left heart catheterization. Ventriculography was performed.  Left coronary artery angiography. The vessel was injected utilizing a  catheter. Right coronary artery angiography. The vessel was injected  utilizing a catheter. RADIATION EXPOSURE: 1.8 min.  CONTRAST GIVEN: Omnipaque 79 ml.  MEDICATIONS GIVEN: Midazolam, 1 mg, IV. Fentanyl, 25 mcg, IV.  VENTRICLES: EF calculated by contrast ventriculography was 20 % and EF  estimated was 20 %. Dilated LV with severe global LV hypokinesia  CORONARY VESSELS: The coronary circulation is right dominant.  LM:   --  LM: Normal.  LAD:   --  LAD: Angiography showed minor luminal irregularities with no  flow limiting lesions.  --  D1: There was a 75 % stenosis. Diffuse small caliber vessel  CX:   --  Distal circumflex: There was a 100 % stenosis. There was good  collateral blood supply to the distal myocardium. unchanged form 9 mos ago  --  OM1: Angiography showed minor luminal irregularities with no flow  limiting lesions. small diffuely diseased  RCA:   --  RCA: Angiography showed minor luminal irregularities with no  flow limiting lesions.  --  RPLS: There was a 99 % stenosis. diffsuely diseased  --  RV marginal 1: There was a 95 % stenosis. small vessel unchanged  COMPLICATIONS: There were no complications.  DIAGNOSTIC IMPRESSIONS: Patient has severe LV dysfunction, dilated LV with  distal coronary diseas that is unchanged from 9 months ago.  DIAGNOSTIC RECOMMENDATIONS: Medical and device therapy for dilated  cardiomyopathy and systolic heart failure.  Prepared and signed by  King Souza M.D.  Signed 10/03/2014 15:16:12  HEMODYNAMIC TABLES  Pressures:  Baseline/ Room Air  Pressures:  - HR: 59  Pressures:  - Rhythm:  Pressures:  -- Aortic Pressure (S/D/M): 134/60/69  Pressures:  -- Left Ventricle (s/edp): 130/29/--  Pressures:  -- Pulmonary Artery (S/D/M): 37/14/24  Pressures:  -- Pulmonary Capillary Wedge: 18/24/16  Pressures:  -- Right Atrium (a/v/M): 15/5/4  Pressures:  -- Right Ventricle (s/edp): 36/7/--  O2 Sats:  Baseline/ Room Air  O2 Sats:  - HR: 59  O2 Sats:  - Rhythm:  O2 Sats:  -- AO: 14.1/93.4/17.91  O2 Sats:  -- PA: 13.3/70.3/12.72  Outputs:  Baseline/ Room Air      ======================================================  PAST MEDICAL & SURGICAL HISTORY:  Hypertension      Diabetes  A1C 6.8  on admission      Former smoker      HLD (hyperlipidemia)      CAD (coronary artery disease)  reports angiogram - 1 year ago - pt reports non obstructive  at [facility]      CHF (congestive heart failure)  (EF 30%)      H/O fracture of wrist  and left ankle (ORIF ankle) following fall        ====================ASSESSMENT ==============  70 y/o M w/ CAD,CHF(EF 30%) s/p AICD, dm2 admitted w/ SOB and malaise hypotensive w/ elevated LA in setting of cardiogenic and distributive shock               Plan:  ====================== NEUROLOGY=====================  Sedation   --continue propofol, RASS goal 0 to -1  --daily sedation vacations     propofol Infusion 10 MICROgram(s)/kG/Min (4.41 mL/Hr) IV Continuous <Continuous>    ==================== RESPIRATORY======================  acute hypoxic respiratory failure   --continue ventilator support  --defer SBT given hemodynamic instability   --wean fio2 as tolerated   --vent bundle    Mechanical Ventilation:  Mode: AC/ CMV (Assist Control/ Continuous Mandatory Ventilation)  RR (machine): 16  TV (machine): 450  FiO2: 30  PEEP: 5  ITime: 1  MAP: 11  PIP: 27      ====================CARDIOVASCULAR==================  mixed shock  --continue dobutamine for inotropic support, wean as able   --wean vasopressors for map > 65  --trend end organ perfusion, lactate, cardiac indices   --TTE on  with EF 5%, consider repeating?    CAD  --continue ASA  --cardiac enzymes downtrended, no need to follow    HLD  --hold statin for transaminitis      aspirin  chewable 81 milliGRAM(s) Enteral Tube daily  DOBUTamine Infusion 3 MICROgram(s)/kG/Min (6.62 mL/Hr) IV Continuous <Continuous>  norepinephrine Infusion 0.05 MICROgram(s)/kG/Min (6.89 mL/Hr) IV Continuous <Continuous>  vasopressin Infusion 0.04 Unit(s)/Min (2.4 mL/Hr) IV Continuous <Continuous>  ===================HEMATOLOGIC/ONC ===================  thrombocytopenia   --platelets slowly improving  --will give 1 platelet for line placement  --heme/onc consult, smear unremarkable, felt to be 2/2 hemolysis     acute illness coagulopathy  --INR elevated, likely in setting of shock liver  --will transfuse FFP x 1 for line placement      heparin  Infusion Syringe 300 Unit(s)/Hr (0.6 mL/Hr) CRRT <Continuous>    ===================== RENAL =========================  ALISSA on CKD w/ unclear baseline Cr  I/O IN: 3243.4 mL / OUT: 1675 mL / NET: 1568.4 mL  - Renal consult called- non oliguric ALISSA  - Monitor BUN/CR and UO: Daughter addresses ok for CRRT if needed to give him a chance.   -CRRT started today, with 0ml/hr removal tolerating well       ==================== GASTROINTESTINAL===================  -as per nutrition vital AF @10cc/hr advanced as tolerated to 50ml x24hrs   - Start MVI     Elevated LFT in setting of shock liver w/ coagulopathy  - US of abd today  - Monitor LFT q 24hrs  - Hold hepatotoxic meds   - Continue Protonix for stress ulcer prophylaxis.     dextrose 10%. 1000 milliLiter(s) (30 mL/Hr) IV Continuous <Continuous>  multivitamin/minerals/iron Oral Solution (CENTRUM) 15 milliLiter(s) Oral daily  pantoprazole  Injectable 40 milliGRAM(s) IV Push two times a day    =======================    ENDOCRINE  =====================  DMT2  -  glycemic control with Admelog sliding scale and Glucagon PRN.   -Monitor blood glucose levels.       dextrose 50% Injectable 25 Gram(s) IV Push once  dextrose 50% Injectable 12.5 Gram(s) IV Push once  dextrose 50% Injectable 25 Gram(s) IV Push once  dextrose Oral Gel 15 Gram(s) Oral once  glucagon  Injectable 1 milliGRAM(s) IntraMuscular once  insulin lispro (ADMELOG) corrective regimen sliding scale   SubCutaneous every 4 hours      ========================INFECTIOUS DISEASE================  Metapneumovirus +  - S/P. Vanco and cefepime, de-escalate if appropriate   -Vanco random level 21.1, holding vanco at this time   --f/u blood cultures   - wbc within normal limits     cefepime   IVPB 1000 milliGRAM(s) IV Intermittent every 24 hours      Patient requires continuous monitoring with bedside rhythm monitoring, pulse ox monitoring, and intermittent blood gas analysis. Care plan discussed with ICU care team. Patient remained critical and at risk for life threatening decompensation.  Patient seen, examined and plan discussed with CCU team during rounds.     I have personally provided __30__ minutes of critical care time excluding time spent on separate procedures, in addition to initial critical care time provided by the CICU Attending, Dr. Xiong.     By signing my name below, I, Horacio Whitley, attest that this documentation has been prepared under the direction and in the presence of Anjana Osei NP    Electronically signed: Nadia Ramesh, 22 @ 23:35    I, Anjana Osei NP, personally performed the services described in this documentation. all medical record entries made by the scribe were at my direction and in my presence. I have reviewed the chart and agree that the record reflects my personal performance and is accurate and complete  Electronically signed: Anjana Osei NP

## 2022-05-14 NOTE — PROGRESS NOTE ADULT - SUBJECTIVE AND OBJECTIVE BOX
ALAN DE LA ROSA  MRN-[id number]  Patient is a 71y old  Male who presents with a chief complaint of Cardiogenic Shock (14 May 2022 08:51)    HPI:   History obtained from wife, daughter and charts.     Pt is a 71M with PMHx DMII, CHF (30%), AICD 1yr ago, HLD. He is a former smoker (quit approx 30 years ago). Pt received his second COVID 19 booster shot this week.     Per daughter, pt began having some new lower extremity edema last week and later developed a night time cough. His symptoms improved after a few days until today when his cough worsened and he became extremely tachypneic and short of breath. Daughter became very concerned and brought him to ED.     In the ED, pt was tachypneic, with systolic BP in the 70s maxed on levo. He was also on Bipap with RR 40s, TV 200s and not mentating well. Lactate was reportedly 11 and pH was 7. In the ED, he was started on milrinone and dobutamine and then shock team was activated. The patient was taken to cath lab and an Impella was placed.     Upon arrival to CICU, pt was on Dobutamine @10, Milrinone @.125, Vaso 0.08, Levo @ 1.0, intubated on PEEP 8 and fio2 100%   (11 May 2022 22:58)      Surgery/Hospital course:    Overnight events:    REVIEW OF SYSTEMS:    CONSTITUTIONAL: No weakness, fevers or chills  EYES/ENT: No visual changes;  No vertigo or throat pain   NECK: No pain or stiffness  RESPIRATORY: No cough, wheezing, hemoptysis; No shortness of breath  CARDIOVASCULAR: No chest pain or palpitations  GASTROINTESTINAL: No abdominal or epigastric pain. No nausea, vomiting, or hematemesis; No diarrhea or constipation. No melena or hematochezia.  GENITOURINARY: No dysuria, frequency or hematuria  NEUROLOGICAL: No numbness or weakness  SKIN: No itching, rashes      Physical Exam:  Vital Signs Last 24 Hrs  T(C): 37 (14 May 2022 14:00), Max: 37.2 (14 May 2022 05:00)  T(F): 98.6 (14 May 2022 14:00), Max: 99 (14 May 2022 05:00)  HR: 78 (14 May 2022 14:30) (75 - 117)  BP: --  BP(mean): --  RR: 17 (14 May 2022 14:30) (15 - 31)  SpO2: 95% (14 May 2022 14:30) (81% - 100%)  Physical Exam:   Constitutional: NAD, well-groomed, well-developed  HEENT: PERRLA, EOMI, no drainage or redness  Neck: supple,  No JVD  Respiratory: Breath Sounds equal & clear bilaterally to auscultation, no rales/rhonchi/wheezing, no accessory muscle use noted  Cardiovascular: Regular rate, regular rhythm, normal S1, S2; no murmurs or rub  Gastrointestinal: Soft, non-tender, non distended, + bowel sounds  Extremities: GUZMÁN x 4, no peripheral edema, no cyanosis, no clubbing   Neurological: A+O x 3; speech clear and intact; no sensory, motor  deficits, normal reflexes  Skin: warm, dry, well perfused  Incisions:    ============================I/O===========================   I&O's Detail    13 May 2022 07:01  -  14 May 2022 07:00  --------------------------------------------------------  IN:    Dexmedetomidine: 21.9 mL    Dexmedetomidine: 3.7 mL    DOBUTamine: 160.2 mL    Enteral Tube Flush: 180 mL    IV PiggyBack: 500 mL    Norepinephrine: 438.9 mL    Propofol: 58 mL    Vasopressin: 144 mL    Vital1.5: 560 mL  Total IN: 2066.7 mL    OUT:    Indwelling Catheter - Urethral (mL): 440 mL    Propofol: 0 mL    Rectal Tube (mL): 900 mL  Total OUT: 1340 mL    Total NET: 726.7 mL      14 May 2022 07:01  -  14 May 2022 14:54  --------------------------------------------------------  IN:    DOBUTamine: 46.2 mL    IV PiggyBack: 200 mL    Norepinephrine: 343.8 mL    Plasma: 300 mL    Platelets - Single Donor: 225 mL    Propofol: 10.5 mL    Vasopressin: 42 mL    Vital1.5: 40 mL  Total IN: 1207.5 mL    OUT:    Indwelling Catheter - Urethral (mL): 70 mL    Nasogastric/Oral tube (mL): 700 mL  Total OUT: 770 mL    Total NET: 437.5 mL        ============================ LABS =========================                        11.3   8.39  )-----------( 46       ( 14 May 2022 00:55 )             36.8     05-14    146<H>  |  100  |  74<H>  ----------------------------<  76  3.7   |  17<L>  |  5.83<H>    Ca    8.3<L>      14 May 2022 00:55  Phos  7.5     05-14  Mg     2.2     05-14    TPro  4.6<L>  /  Alb  3.3  /  TBili  9.2<H>  /  DBili  x   /  AST  19324<H>  /  ALT  6953<H>  /  AlkPhos  151<H>  05-14    LIVER FUNCTIONS - ( 14 May 2022 00:55 )  Alb: 3.3 g/dL / Pro: 4.6 g/dL / ALK PHOS: 151 U/L / ALT: 6953 U/L / AST: 17398 U/L / GGT: x           PT/INR - ( 14 May 2022 00:55 )   PT: 40.8 sec;   INR: 3.47 ratio         PTT - ( 14 May 2022 00:55 )  PTT:45.3 sec  ABG - ( 14 May 2022 06:08 )  pH, Arterial: 7.37  pH, Blood: x     /  pCO2: 32    /  pO2: 94    / HCO3: 18    / Base Excess: -5.9  /  SaO2: 97.7                ======================Micro/Rad/Cardio=================  Culture: Reviewed   CXR: Reviewed  Echo:Reviewed  ======================================================  PAST MEDICAL & SURGICAL HISTORY:  Hypertension      Diabetes  A1C 6.8  on admission      Former smoker      HLD (hyperlipidemia)      CAD (coronary artery disease)  reports angiogram - 1 year ago - pt reports non obstructive  at [facility]      CHF (congestive heart failure)  (EF 30%)      H/O fracture of wrist  and left ankle (ORIF ankle) following fall    ====================ASSESSMENT ==============  72 y/o M w/ CAD,CHF(EF 30%) s/p AICD, dm2 admitted w/ SOB and malaise hypotensive w/ elevated LA in setting of cardiogenic and distributive shock       ====================== NEUROLOGY=====================  dexMEDEtomidine Infusion 0.2 MICROgram(s)/kG/Hr (3.68 mL/Hr) IV Continuous <Continuous>  propofol Infusion 10 MICROgram(s)/kG/Min (4.41 mL/Hr) IV Continuous <Continuous>    ==================== RESPIRATORY======================  Mechanical Ventilation:  Mode: AC/ CMV (Assist Control/ Continuous Mandatory Ventilation)  RR (machine): 16  TV (machine): 450  FiO2: 30  PEEP: 5  ITime: 1  MAP: 11  PIP: 27      ====================CARDIOVASCULAR==================  DOBUTamine Infusion 3 MICROgram(s)/kG/Min (6.62 mL/Hr) IV Continuous <Continuous>  norepinephrine Infusion 0.05 MICROgram(s)/kG/Min (6.89 mL/Hr) IV Continuous <Continuous>    ===================HEMATOLOGIC/ONC ===================  aspirin  chewable 81 milliGRAM(s) Enteral Tube daily  heparin  Infusion Syringe 300 Unit(s)/Hr (0.6 mL/Hr) CRRT <Continuous>    ===================== RENAL =========================  Continue monitoring urine output    ==================== GASTROINTESTINAL===================  lactulose Retention Enema 200 Gram(s) Rectal once  multivitamin/minerals/iron Oral Solution (CENTRUM) 15 milliLiter(s) Oral daily  pantoprazole  Injectable 40 milliGRAM(s) IV Push two times a day    =======================    ENDOCRINE  =====================  dextrose 50% Injectable 25 Gram(s) IV Push once  dextrose 50% Injectable 12.5 Gram(s) IV Push once  dextrose 50% Injectable 25 Gram(s) IV Push once  dextrose Oral Gel 15 Gram(s) Oral once  glucagon  Injectable 1 milliGRAM(s) IntraMuscular once  insulin lispro (ADMELOG) corrective regimen sliding scale   SubCutaneous three times a day before meals  vasopressin Infusion 0.04 Unit(s)/Min (2.4 mL/Hr) IV Continuous <Continuous>    ========================INFECTIOUS DISEASE================  cefepime   IVPB 1000 milliGRAM(s) IV Intermittent every 24 hours      ========================PROPHYLACTIC MEASURE================  DVT  GI Protonix  Graft patency   Beta blocker      Patient requires continuous monitoring with bedside rhythm monitoring, arterial line, pulse oximetry, ventilator monitoring and intermittent blood gas analysis.  Care plan discussed with ICU care team.  Patient remained critical; required more than usual post op care; I have spent 35 minutes providing non-routine post op care, revaluated multiple times during the day.         ALAN DE LA ROSA  [id number]  Patient is a 71y old  Male who presents with a chief complaint of Cardiogenic Shock (14 May 2022 08:51)    HPI:   History obtained from wife, daughter and charts.     Pt is a 71M with PMHx DMII, CHF (30%), AICD 1yr ago, HLD. He is a former smoker (quit approx 30 years ago). Pt received his second COVID 19 booster shot this week.     Per daughter, pt began having some new lower extremity edema last week and later developed a night time cough. His symptoms improved after a few days until today when his cough worsened and he became extremely tachypneic and short of breath. Daughter became very concerned and brought him to ED.     In the ED, pt was tachypneic, with systolic BP in the 70s maxed on levo. He was also on Bipap with RR 40s, TV 200s and not mentating well. Lactate was reportedly 11 and pH was 7. In the ED, he was started on milrinone and dobutamine and then shock team was activated. The patient was taken to cath lab and an Impella was placed.     Upon arrival to CICU, pt was on Dobutamine @10, Milrinone @.125, Vaso 0.08, Levo @ 1.0, intubated on PEEP 8 and fio2 100%   (11 May 2022 22:58)      Surgery/Hospital course:  5/11 Mixed shock picture, lactic acidosis, intubated, Impella placed   5/12 Impella removed   5/13 vasopressors weaned  5/14 MSOF, rising lactate, pressor reqt's increasing, CRRT initiated      REVIEW OF SYSTEMS: deferred, intubated and sedated       Physical Exam:  Vital Signs Last 24 Hrs  T(C): 37 (14 May 2022 14:00), Max: 37.2 (14 May 2022 05:00)  T(F): 98.6 (14 May 2022 14:00), Max: 99 (14 May 2022 05:00)  HR: 78 (14 May 2022 14:30) (75 - 117)  BP(mean): 65  RR: 17 (14 May 2022 14:30) (15 - 31)  SpO2: 95% (14 May 2022 14:30) (81% - 100%)      Physical Exam:   Constitutional: no acute distress   HEENT: PERRLA, EOMI, no drainage or redness  Neck: supple,  No JVD  Respiratory: Breath Sounds equal & clear bilaterally to auscultation, no rales/rhonchi/wheezing, no accessory muscle use noted  Cardiovascular: Regular rate, regular rhythm, normal S1, S2; no murmurs or rub  Gastrointestinal: Soft, non-tender, non distended, + bowel sounds  Extremities: GUZMÁN x 4, no peripheral edema, no cyanosis, no clubbing   Neurological: A+O x 3; speech clear and intact; no sensory, motor  deficits, normal reflexes  Skin: warm, dry, well perfused  Incisions:    ============================I/O===========================   I&O's Detail    13 May 2022 07:01  -  14 May 2022 07:00  --------------------------------------------------------  IN:    Dexmedetomidine: 21.9 mL    Dexmedetomidine: 3.7 mL    DOBUTamine: 160.2 mL    Enteral Tube Flush: 180 mL    IV PiggyBack: 500 mL    Norepinephrine: 438.9 mL    Propofol: 58 mL    Vasopressin: 144 mL    Vital1.5: 560 mL  Total IN: 2066.7 mL    OUT:    Indwelling Catheter - Urethral (mL): 440 mL    Propofol: 0 mL    Rectal Tube (mL): 900 mL  Total OUT: 1340 mL    Total NET: 726.7 mL      14 May 2022 07:01  -  14 May 2022 14:54  --------------------------------------------------------  IN:    DOBUTamine: 46.2 mL    IV PiggyBack: 200 mL    Norepinephrine: 343.8 mL    Plasma: 300 mL    Platelets - Single Donor: 225 mL    Propofol: 10.5 mL    Vasopressin: 42 mL    Vital1.5: 40 mL  Total IN: 1207.5 mL    OUT:    Indwelling Catheter - Urethral (mL): 70 mL    Nasogastric/Oral tube (mL): 700 mL  Total OUT: 770 mL    Total NET: 437.5 mL        ============================ LABS =========================                        11.3   8.39  )-----------( 46       ( 14 May 2022 00:55 )             36.8     05-14    146<H>  |  100  |  74<H>  ----------------------------<  76  3.7   |  17<L>  |  5.83<H>    Ca    8.3<L>      14 May 2022 00:55  Phos  7.5     05-14  Mg     2.2     05-14    TPro  4.6<L>  /  Alb  3.3  /  TBili  9.2<H>  /  DBili  x   /  AST  59066<H>  /  ALT  6953<H>  /  AlkPhos  151<H>  05-14    LIVER FUNCTIONS - ( 14 May 2022 00:55 )  Alb: 3.3 g/dL / Pro: 4.6 g/dL / ALK PHOS: 151 U/L / ALT: 6953 U/L / AST: 92031 U/L / GGT: x           PT/INR - ( 14 May 2022 00:55 )   PT: 40.8 sec;   INR: 3.47 ratio         PTT - ( 14 May 2022 00:55 )  PTT:45.3 sec  ABG - ( 14 May 2022 06:08 )  pH, Arterial: 7.37  pH, Blood: x     /  pCO2: 32    /  pO2: 94    / HCO3: 18    / Base Excess: -5.9  /  SaO2: 97.7                ======================Micro/Rad/Cardio=================  Culture: Reviewed   CXR: Reviewed  Echo:Reviewed  ======================================================  PAST MEDICAL & SURGICAL HISTORY:  Hypertension      Diabetes  A1C 6.8  on admission      Former smoker      HLD (hyperlipidemia)      CAD (coronary artery disease)  reports angiogram - 1 year ago - pt reports non obstructive  at [facility]      CHF (congestive heart failure)  (EF 30%)      H/O fracture of wrist  and left ankle (ORIF ankle) following fall    ====================ASSESSMENT ==============  70 y/o M w/ CAD,CHF(EF 30%) s/p AICD, dm2 admitted w/ SOB and malaise hypotensive w/ elevated LA in setting of cardiogenic and distributive shock       ====================== NEUROLOGY=====================  dexMEDEtomidine Infusion 0.2 MICROgram(s)/kG/Hr (3.68 mL/Hr) IV Continuous <Continuous>  propofol Infusion 10 MICROgram(s)/kG/Min (4.41 mL/Hr) IV Continuous <Continuous>    ==================== RESPIRATORY======================  Mechanical Ventilation:  Mode: AC/ CMV (Assist Control/ Continuous Mandatory Ventilation)  RR (machine): 16  TV (machine): 450  FiO2: 30  PEEP: 5  ITime: 1  MAP: 11  PIP: 27      ====================CARDIOVASCULAR==================  DOBUTamine Infusion 3 MICROgram(s)/kG/Min (6.62 mL/Hr) IV Continuous <Continuous>  norepinephrine Infusion 0.05 MICROgram(s)/kG/Min (6.89 mL/Hr) IV Continuous <Continuous>    ===================HEMATOLOGIC/ONC ===================  aspirin  chewable 81 milliGRAM(s) Enteral Tube daily  heparin  Infusion Syringe 300 Unit(s)/Hr (0.6 mL/Hr) CRRT <Continuous>    ===================== RENAL =========================  Continue monitoring urine output    ==================== GASTROINTESTINAL===================  lactulose Retention Enema 200 Gram(s) Rectal once  multivitamin/minerals/iron Oral Solution (CENTRUM) 15 milliLiter(s) Oral daily  pantoprazole  Injectable 40 milliGRAM(s) IV Push two times a day    =======================    ENDOCRINE  =====================  dextrose 50% Injectable 25 Gram(s) IV Push once  dextrose 50% Injectable 12.5 Gram(s) IV Push once  dextrose 50% Injectable 25 Gram(s) IV Push once  dextrose Oral Gel 15 Gram(s) Oral once  glucagon  Injectable 1 milliGRAM(s) IntraMuscular once  insulin lispro (ADMELOG) corrective regimen sliding scale   SubCutaneous three times a day before meals  vasopressin Infusion 0.04 Unit(s)/Min (2.4 mL/Hr) IV Continuous <Continuous>    ========================INFECTIOUS DISEASE================  cefepime   IVPB 1000 milliGRAM(s) IV Intermittent every 24 hours      ========================PROPHYLACTIC MEASURE================  DVT  GI Protonix  Graft patency   Beta blocker      Patient requires continuous monitoring with bedside rhythm monitoring, arterial line, pulse oximetry, ventilator monitoring and intermittent blood gas analysis.  Care plan discussed with ICU care team.  Patient remained critical; required more than usual post op care; I have spent 35 minutes providing non-routine post op care, revaluated multiple times during the day.         ALAN DE LA ROSA  MRN-[id number]  Patient is a 71y old  Male who presents with a chief complaint of Cardiogenic Shock (14 May 2022 08:51)    HPI:   History obtained from wife, daughter and charts.     Pt is a 71M with PMHx DMII, CHF (30%), AICD 1yr ago, HLD. He is a former smoker (quit approx 30 years ago). Pt received his second COVID 19 booster shot this week.     Per daughter, pt began having some new lower extremity edema last week and later developed a night time cough. His symptoms improved after a few days until today when his cough worsened and he became extremely tachypneic and short of breath. Daughter became very concerned and brought him to ED.     In the ED, pt was tachypneic, with systolic BP in the 70s maxed on levo. He was also on Bipap with RR 40s, TV 200s and not mentating well. Lactate was reportedly 11 and pH was 7. In the ED, he was started on milrinone and dobutamine and then shock team was activated. The patient was taken to cath lab and an Impella was placed.     Upon arrival to CICU, pt was on Dobutamine @10, Milrinone @.125, Vaso 0.08, Levo @ 1.0, intubated on PEEP 8 and fio2 100%   (11 May 2022 22:58)      Surgery/Hospital course:  5/11 Mixed shock picture, lactic acidosis, intubated, Impella placed   5/12 Impella removed   5/13 vasopressors weaned  5/14 MSOF, rising lactate, pressor reqt's increasing, CRRT initiated      REVIEW OF SYSTEMS: deferred, intubated and sedated       Physical Exam:  Vital Signs Last 24 Hrs  T(C): 37 (14 May 2022 14:00), Max: 37.2 (14 May 2022 05:00)  T(F): 98.6 (14 May 2022 14:00), Max: 99 (14 May 2022 05:00)  HR: 78 (14 May 2022 14:30) (75 - 117)  BP(mean): 65  RR: 17 (14 May 2022 14:30) (15 - 31)  SpO2: 95% (14 May 2022 14:30) (81% - 100%)      Physical Exam:   Constitutional: no acute distress   HEENT: PERRLA, EOMI, no drainage or redness  Neck: supple,  No JVD  Respiratory: Breath Sounds equal & clear bilaterally to auscultation, no rales/rhonchi/wheezing, no accessory muscle use noted  Cardiovascular: Regular rate, regular rhythm, normal S1, S2; no murmurs or rub  Gastrointestinal: Soft, non-tender, non distended, + bowel sounds  Extremities: GUZMÁN x 4, no peripheral edema, no cyanosis, no clubbing   Neurological: A+O x 3; speech clear and intact; no sensory, motor  deficits, normal reflexes  Skin: warm, dry, well perfused  Incisions:    ============================I/O===========================   I&O's Detail    13 May 2022 07:01  -  14 May 2022 07:00  --------------------------------------------------------  IN:    Dexmedetomidine: 21.9 mL    Dexmedetomidine: 3.7 mL    DOBUTamine: 160.2 mL    Enteral Tube Flush: 180 mL    IV PiggyBack: 500 mL    Norepinephrine: 438.9 mL    Propofol: 58 mL    Vasopressin: 144 mL    Vital1.5: 560 mL  Total IN: 2066.7 mL    OUT:    Indwelling Catheter - Urethral (mL): 440 mL    Propofol: 0 mL    Rectal Tube (mL): 900 mL  Total OUT: 1340 mL    Total NET: 726.7 mL      14 May 2022 07:01  -  14 May 2022 14:54  --------------------------------------------------------  IN:    DOBUTamine: 46.2 mL    IV PiggyBack: 200 mL    Norepinephrine: 343.8 mL    Plasma: 300 mL    Platelets - Single Donor: 225 mL    Propofol: 10.5 mL    Vasopressin: 42 mL    Vital1.5: 40 mL  Total IN: 1207.5 mL    OUT:    Indwelling Catheter - Urethral (mL): 70 mL    Nasogastric/Oral tube (mL): 700 mL  Total OUT: 770 mL    Total NET: 437.5 mL        ============================ LABS =========================                        11.3   8.39  )-----------( 46       ( 14 May 2022 00:55 )             36.8     05-14    146<H>  |  100  |  74<H>  ----------------------------<  76  3.7   |  17<L>  |  5.83<H>    Ca    8.3<L>      14 May 2022 00:55  Phos  7.5     05-14  Mg     2.2     05-14    TPro  4.6<L>  /  Alb  3.3  /  TBili  9.2<H>  /  DBili  x   /  AST  58295<H>  /  ALT  6953<H>  /  AlkPhos  151<H>  05-14    LIVER FUNCTIONS - ( 14 May 2022 00:55 )  Alb: 3.3 g/dL / Pro: 4.6 g/dL / ALK PHOS: 151 U/L / ALT: 6953 U/L / AST: 06713 U/L / GGT: x           PT/INR - ( 14 May 2022 00:55 )   PT: 40.8 sec;   INR: 3.47 ratio         PTT - ( 14 May 2022 00:55 )  PTT:45.3 sec  ABG - ( 14 May 2022 06:08 )  pH, Arterial: 7.37  pH, Blood: x     /  pCO2: 32    /  pO2: 94    / HCO3: 18    / Base Excess: -5.9  /  SaO2: 97.7                ======================Micro/Rad/Cardio=================  Culture: Reviewed   CXR: Reviewed  Echo:Reviewed  ======================================================  PAST MEDICAL & SURGICAL HISTORY:  Hypertension      Diabetes  A1C 6.8  on admission      Former smoker      HLD (hyperlipidemia)      CAD (coronary artery disease)  reports angiogram - 1 year ago - pt reports non obstructive  at [facility]      CHF (congestive heart failure)  (EF 30%)      H/O fracture of wrist  and left ankle (ORIF ankle) following fall    ====================ASSESSMENT ==============  72 y/o M w/ CAD,CHF(EF 30%) s/p AICD, dm2 admitted w/ SOB and malaise hypotensive w/ elevated LA in setting of cardiogenic and distributive shock       ====================== NEUROLOGY=====================  --continue propofol, RASS goal 0 to -1  --daily sedation vacations   propofol Infusion 10 MICROgram(s)/kG/Min (4.41 mL/Hr) IV Continuous <Continuous>    ==================== RESPIRATORY======================  --continue ventilator support  --defer SBT given hemodynamic instability   --    Mechanical Ventilation:  Mode: AC/ CMV (Assist Control/ Continuous Mandatory Ventilation)  RR (machine): 16  TV (machine): 450  FiO2: 30  PEEP: 5  ITime: 1  MAP: 11  PIP: 27      ====================CARDIOVASCULAR==================  DOBUTamine Infusion 3 MICROgram(s)/kG/Min (6.62 mL/Hr) IV Continuous <Continuous>  norepinephrine Infusion 0.05 MICROgram(s)/kG/Min (6.89 mL/Hr) IV Continuous <Continuous>    ===================HEMATOLOGIC/ONC ===================  aspirin  chewable 81 milliGRAM(s) Enteral Tube daily  heparin  Infusion Syringe 300 Unit(s)/Hr (0.6 mL/Hr) CRRT <Continuous>    ===================== RENAL =========================  Continue monitoring urine output    ==================== GASTROINTESTINAL===================  lactulose Retention Enema 200 Gram(s) Rectal once  multivitamin/minerals/iron Oral Solution (CENTRUM) 15 milliLiter(s) Oral daily  pantoprazole  Injectable 40 milliGRAM(s) IV Push two times a day    =======================    ENDOCRINE  =====================  dextrose 50% Injectable 25 Gram(s) IV Push once  dextrose 50% Injectable 12.5 Gram(s) IV Push once  dextrose 50% Injectable 25 Gram(s) IV Push once  dextrose Oral Gel 15 Gram(s) Oral once  glucagon  Injectable 1 milliGRAM(s) IntraMuscular once  insulin lispro (ADMELOG) corrective regimen sliding scale   SubCutaneous three times a day before meals  vasopressin Infusion 0.04 Unit(s)/Min (2.4 mL/Hr) IV Continuous <Continuous>    ========================INFECTIOUS DISEASE================  cefepime   IVPB 1000 milliGRAM(s) IV Intermittent every 24 hours      ========================PROPHYLACTIC MEASURE================  DVT  GI Protonix  Graft patency   Beta blocker      Patient requires continuous monitoring with bedside rhythm monitoring, arterial line, pulse oximetry, ventilator monitoring and intermittent blood gas analysis.  Care plan discussed with ICU care team.  Patient remained critical; required more than usual post op care; I have spent 35 minutes providing non-routine post op care, revaluated multiple times during the day.         ALAN DE LA ROSA  MRN-[id number]  Patient is a 71y old  Male who presents with a chief complaint of Cardiogenic Shock (14 May 2022 08:51)    HPI:   History obtained from wife, daughter and charts.     Pt is a 71M with PMHx DMII, CHF (30%), AICD 1yr ago, HLD. He is a former smoker (quit approx 30 years ago). Pt received his second COVID 19 booster shot this week.     Per daughter, pt began having some new lower extremity edema last week and later developed a night time cough. His symptoms improved after a few days until today when his cough worsened and he became extremely tachypneic and short of breath. Daughter became very concerned and brought him to ED.     In the ED, pt was tachypneic, with systolic BP in the 70s maxed on levo. He was also on Bipap with RR 40s, TV 200s and not mentating well. Lactate was reportedly 11 and pH was 7. In the ED, he was started on milrinone and dobutamine and then shock team was activated. The patient was taken to cath lab and an Impella was placed.     Upon arrival to CICU, pt was on Dobutamine @10, Milrinone @.125, Vaso 0.08, Levo @ 1.0, intubated on PEEP 8 and fio2 100%   (11 May 2022 22:58)      Surgery/Hospital course:  5/11 Mixed shock picture, lactic acidosis, intubated, Impella placed   5/12 Impella removed   5/13 vasopressors weaned  5/14 MSOF, rising lactate, pressor reqt's increasing, CRRT initiated      REVIEW OF SYSTEMS: deferred, intubated and sedated       Physical Exam:  Vital Signs Last 24 Hrs  T(C): 37 (14 May 2022 14:00), Max: 37.2 (14 May 2022 05:00)  T(F): 98.6 (14 May 2022 14:00), Max: 99 (14 May 2022 05:00)  HR: 78 (14 May 2022 14:30) (75 - 117)  BP(mean): 65  RR: 17 (14 May 2022 14:30) (15 - 31)  SpO2: 95% (14 May 2022 14:30) (81% - 100%)      Physical Exam:   Constitutional: no acute distress   HEENT: PERRLA, EOMI, no drainage or redness  Neck: supple,  No JVD  Respiratory: Breath Sounds equal & clear bilaterally to auscultation, no rales/rhonchi/wheezing, no accessory muscle use noted  Cardiovascular: aflutter, normal S1, S2; no murmurs or rub  Gastrointestinal: Soft, non-tender, non distended, + bowel sounds, NGT  Extremities: GUZMÁN x 4, no peripheral edema, no cyanosis, no clubbing   Neurological: sedated   Skin: warm, dry, well perfused    ============================I/O===========================   I&O's Detail    13 May 2022 07:01  -  14 May 2022 07:00  --------------------------------------------------------  IN:    Dexmedetomidine: 21.9 mL    Dexmedetomidine: 3.7 mL    DOBUTamine: 160.2 mL    Enteral Tube Flush: 180 mL    IV PiggyBack: 500 mL    Norepinephrine: 438.9 mL    Propofol: 58 mL    Vasopressin: 144 mL    Vital1.5: 560 mL  Total IN: 2066.7 mL    OUT:    Indwelling Catheter - Urethral (mL): 440 mL    Propofol: 0 mL    Rectal Tube (mL): 900 mL  Total OUT: 1340 mL    Total NET: 726.7 mL      14 May 2022 07:01  -  14 May 2022 14:54  --------------------------------------------------------  IN:    DOBUTamine: 46.2 mL    IV PiggyBack: 200 mL    Norepinephrine: 343.8 mL    Plasma: 300 mL    Platelets - Single Donor: 225 mL    Propofol: 10.5 mL    Vasopressin: 42 mL    Vital1.5: 40 mL  Total IN: 1207.5 mL    OUT:    Indwelling Catheter - Urethral (mL): 70 mL    Nasogastric/Oral tube (mL): 700 mL  Total OUT: 770 mL    Total NET: 437.5 mL        ============================ LABS =========================                        11.3   8.39  )-----------( 46       ( 14 May 2022 00:55 )             36.8     05-14    146<H>  |  100  |  74<H>  ----------------------------<  76  3.7   |  17<L>  |  5.83<H>    Ca    8.3<L>      14 May 2022 00:55  Phos  7.5     05-14  Mg     2.2     05-14    TPro  4.6<L>  /  Alb  3.3  /  TBili  9.2<H>  /  DBili  x   /  AST  46614<H>  /  ALT  6953<H>  /  AlkPhos  151<H>  05-14    LIVER FUNCTIONS - ( 14 May 2022 00:55 )  Alb: 3.3 g/dL / Pro: 4.6 g/dL / ALK PHOS: 151 U/L / ALT: 6953 U/L / AST: 79493 U/L / GGT: x           PT/INR - ( 14 May 2022 00:55 )   PT: 40.8 sec;   INR: 3.47 ratio         PTT - ( 14 May 2022 00:55 )  PTT:45.3 sec  ABG - ( 14 May 2022 06:08 )  pH, Arterial: 7.37  pH, Blood: x     /  pCO2: 32    /  pO2: 94    / HCO3: 18    / Base Excess: -5.9  /  SaO2: 97.7                ======================Micro/Rad/Cardio=================  Culture: Reviewed   CXR: Reviewed  Echo:Reviewed  ======================================================  PAST MEDICAL & SURGICAL HISTORY:  Hypertension      Diabetes  A1C 6.8  on admission      Former smoker      HLD (hyperlipidemia)      CAD (coronary artery disease)  reports angiogram - 1 year ago - pt reports non obstructive  at [facility]      CHF (congestive heart failure)  (EF 30%)      H/O fracture of wrist  and left ankle (ORIF ankle) following fall    ====================ASSESSMENT ==============  70 y/o M w/ CAD,CHF(EF 30%) s/p AICD, dm2 admitted w/ SOB and malaise hypotensive w/ elevated LA in setting of cardiogenic and distributive shock       ====================== NEUROLOGY=====================  --continue propofol, RASS goal 0 to -1  --daily sedation vacations   propofol Infusion 10 MICROgram(s)/kG/Min (4.41 mL/Hr) IV Continuous <Continuous>    ==================== RESPIRATORY======================  #acute hypoxic respiratory failure   --continue ventilator support  --defer SBT given hemodynamic instability   --wean fio2 as tolerated   --vent bundle    Mechanical Ventilation:  Mode: AC/ CMV (Assist Control/ Continuous Mandatory Ventilation)  RR (machine): 16  TV (machine): 450  FiO2: 30  PEEP: 5  ITime: 1  MAP: 11  PIP: 27      ====================CARDIOVASCULAR==================  #mixed shock  --continue dobutamine for inotropic support, wean as able   --wean vasopressors for map > 65  --trend end organ perfusion, lactate, cardiac indices   --TTE on 5/11 with EF 5%, consider repeating?    #CAD  --continue ASA  --cardiac enzymes downtrended, no need to follow    #HLD  --hold statin for transaminitis    DOBUTamine Infusion 3 MICROgram(s)/kG/Min (6.62 mL/Hr) IV Continuous <Continuous>  norepinephrine Infusion 0.05 MICROgram(s)/kG/Min (6.89 mL/Hr) IV Continuous <Continuous>    ===================HEMATOLOGIC/ONC ===================  #thrombocytopenia   --platelets slowly improving  --will give 1 platelet for line placement  --heme/onc consult, smear unremarkable, felt to be 2/2 hemolysis     # acute illness coagulopathy  --INR elevated, likely in setting of shock liver  --will transfuse FFP x 1 for line placement    aspirin  chewable 81 milliGRAM(s) Enteral Tube daily  heparin  Infusion Syringe 300 Unit(s)/Hr (0.6 mL/Hr) CRRT <Continuous>    ===================== RENAL =========================  #ALISSA (as per family no history of CKD), non-oliguric   --Cr 5.8 today, will initiate CRRT for clearance  --avoid nephrotoxic medications  --renal recs appreciated  --continue to follow renal function  --continue monitoring urine output    ==================== GASTROINTESTINAL===================  #ileus  --NGT to sunction  --hold TF for now    #diarrhea  --GI PCR and CDiff negative  --continue to monitor stool output    #transaminitis   --LFTs seem to have peaked, continue to trend   --RUQ ultrasound unremarkable  --lactulose for elevated ammonia, would recheck in AM     --continue PPI while intubated     lactulose Retention Enema 200 Gram(s) Rectal once  multivitamin/minerals/iron Oral Solution (CENTRUM) 15 milliLiter(s) Oral daily  pantoprazole  Injectable 40 milliGRAM(s) IV Push two times a day    =======================    ENDOCRINE  =====================  #DM2  --A1C 7.7  --continue ISS    #hypoglycemia  --likely in setting of shock liver  --continue to monitor BS  --consider starting D10 infusion while NPO if needed     dextrose 50% Injectable 25 Gram(s) IV Push once  dextrose 50% Injectable 12.5 Gram(s) IV Push once  dextrose 50% Injectable 25 Gram(s) IV Push once  dextrose Oral Gel 15 Gram(s) Oral once  glucagon  Injectable 1 milliGRAM(s) IntraMuscular once  insulin lispro (ADMELOG) corrective regimen sliding scale SubCutaneous three times a day before meals  vasopressin Infusion 0.04 Unit(s)/Min (2.4 mL/Hr) IV Continuous <Continuous>    ========================INFECTIOUS DISEASE================  --follow up infectious work-up, so far unremarkable  --would send BAL if able   --continue empiric antibiotics  --re-dose when stable on CRRT    cefepime   IVPB 1000 milliGRAM(s) IV Intermittent every 24 hours      ========================PROPHYLACTIC MEASURE================  DVT--deferred   GI Protonix--protonix  Graft patency--n/a  Beta blocker--held      Patient requires continuous monitoring with bedside rhythm monitoring, arterial line, pulse oximetry, ventilator monitoring and intermittent blood gas analysis.  Care plan discussed with ICU care team.  Patient remained critical; required more than usual post op care; I have spent 35 minutes providing non-routine post op care, revaluated multiple times during the day.

## 2022-05-14 NOTE — PROGRESS NOTE ADULT - ASSESSMENT
Patient is a 71 year old Male with PMHx DM, CHF (30%), AICD 1yr ago, HLD. who was admitted at [facility] on 5/11/22 with complaints of SOB, LLE swelling. In ER, pt. was tachypneic with systolic BP in 70s mmHg. Pt. was admitted for cardiogenic shock. Impella was placed on 5/11 and removed on 5/12. Over the course of hospital stay, Scr increased from 1.8 on admission to 4.13 on the day of the consult Nephrology team was consulted for ALISSA management.    ALISSA: In the setting of  shock ( possibly with cardiogenic and septic)  No H/O CKD per family  currently on pressors     Renal function continues to worsen with U/O of 400 cc over the last 24 hours  Remains on inotropes/pressors  Will initiate CVVHDF  Antibiotics will need to be adjusted once stable on CVVHDF     Patient is a 71 year old Male with PMHx DM, CHF (30%), AICD 1yr ago, HLD. who was admitted at [facility] on 5/11/22 with complaints of SOB, LLE swelling. In ER, pt. was tachypneic with systolic BP in 70s mmHg. Pt. was admitted for cardiogenic shock. Impella was placed on 5/11 and removed on 5/12. Over the course of hospital stay, Scr increased from 1.8 on admission to 4.13 on the day of the consult Nephrology team was consulted for ALISSA management.    ALISSA: In the setting of  shock ( possibly with cardiogenic and septic)  No H/O CKD per family  currently on pressors     Renal function continues to worsen with U/O of 400 cc over the last 24 hours  Remains on inotropes/pressors  Will initiate CVVHDF  Antibiotics will need to be adjusted once stable on CVVHDF    D/W CCU

## 2022-05-15 NOTE — PROGRESS NOTE ADULT - ASSESSMENT
Patient is a 71 year old Male with PMHx DM, CHF (30%), AICD 1yr ago, HLD. who was admitted at [facility] on 5/11/22 with complaints of SOB, LLE swelling. In ER, pt. was tachypneic with systolic BP in 70s mmHg. Pt. was admitted for cardiogenic shock. Impella was placed on 5/11 and removed on 5/12. Over the course of hospital stay, Scr increased from 1.8 on admission to 4.13 on the day of the consult Nephrology team was consulted for ALISSA management.    ALISSA: In the setting of  shock ( possibly with cardiogenic and septic)  No H/O CKD per family  currently on pressors/inotropes  Maintain on CVVHDF   Antibiotics need to be adjusted for CVVHDF    D/W CCU Patient is a 71 year old Male with PMHx DM, CHF (30%), AICD 1yr ago, HLD. who was admitted at [facility] on 5/11/22 with complaints of SOB, LLE swelling. In ER, pt. was tachypneic with systolic BP in 70s mmHg. Pt. was admitted for cardiogenic shock. Impella was placed on 5/11 and removed on 5/12. Over the course of hospital stay, Scr increased from 1.8 on admission to 4.13 on the day of the consult Nephrology team was consulted for ALISSA management.    ALISSA: In the setting of  shock ( possibly with cardiogenic and septic)  No H/O CKD per family  currently on pressors/inotropes  Maintain on CVVHDF. Keeping net even. Minimal U/O noted  Antibiotics need to be adjusted for CVVHDF, increase cefepime to 2 gm Q12   D/W CCU

## 2022-05-15 NOTE — PROGRESS NOTE ADULT - SUBJECTIVE AND OBJECTIVE BOX
[facility] DIVISION OF KIDNEY DISEASES AND HYPERTENSION -- FOLLOW UP NOTE  --------------------------------------------------------------------------------  Chief Complaint: cardiogenic shock    24 hour events/subjective:  on crrt        PAST HISTORY  --------------------------------------------------------------------------------  No significant changes to PMH, PSH, FHx, SHx, unless otherwise noted    ALLERGIES & MEDICATIONS  --------------------------------------------------------------------------------  Allergies    No Known Allergies    Intolerances      Standing Inpatient Medications  artificial  tears Solution 1 Drop(s) Both EYES three times a day  aspirin  chewable 81 milliGRAM(s) Enteral Tube daily  cefepime   IVPB 1000 milliGRAM(s) IV Intermittent every 24 hours  chlorhexidine 0.12% Liquid 15 milliLiter(s) Oral Mucosa every 12 hours  chlorhexidine 4% Liquid 1 Application(s) Topical <User Schedule>  CRRT Treatment    <Continuous>  dextrose 10%. 1000 milliLiter(s) IV Continuous <Continuous>  dextrose 50% Injectable 25 Gram(s) IV Push once  dextrose 50% Injectable 12.5 Gram(s) IV Push once  dextrose 50% Injectable 25 Gram(s) IV Push once  dextrose Oral Gel 15 Gram(s) Oral once  DOBUTamine Infusion 3 MICROgram(s)/kG/Min IV Continuous <Continuous>  glucagon  Injectable 1 milliGRAM(s) IntraMuscular once  heparin  Infusion Syringe 300 Unit(s)/Hr CRRT <Continuous>  insulin lispro (ADMELOG) corrective regimen sliding scale   SubCutaneous every 4 hours  multivitamin/minerals/iron Oral Solution (CENTRUM) 15 milliLiter(s) Oral daily  norepinephrine Infusion 0.05 MICROgram(s)/kG/Min IV Continuous <Continuous>  pantoprazole  Injectable 40 milliGRAM(s) IV Push two times a day  PrismaSATE Dialysate BGK 4 / 2.5 5000 milliLiter(s) CRRT <Continuous>  PrismaSOL Filtration BGK 4 / 2.5 5000 milliLiter(s) CRRT <Continuous>  PrismaSOL Filtration BGK 4 / 2.5 5000 milliLiter(s) CRRT <Continuous>  propofol Infusion 10 MICROgram(s)/kG/Min IV Continuous <Continuous>  vasopressin Infusion 0.04 Unit(s)/Min IV Continuous <Continuous>    PRN Inpatient Medications      REVIEW OF SYSTEMS  --------------------------------------------------------------------------------  Unable to obtain    VITALS/PHYSICAL EXAM  --------------------------------------------------------------------------------  T(C): 37.1 (05-15-22 @ 07:00), Max: 37.2 (05-14-22 @ 11:00)  HR: 81 (05-15-22 @ 08:00) (75 - 110)  BP: --  RR: 21 (05-15-22 @ 08:00) (16 - 27)  SpO2: 98% (05-15-22 @ 08:00) (92% - 100%)  Wt(kg): --        05-14-22 @ 07:01  -  05-15-22 @ 07:00  --------------------------------------------------------  IN: 3598 mL / OUT: 4373 mL / NET: -775 mL    05-15-22 @ 07:01  -  05-15-22 @ 08:49  --------------------------------------------------------  IN: 99.8 mL / OUT: 115 mL / NET: -15.2 mL      Physical Exam:  	PHYSICAL EXAM: vital signs as above  in no apparent distress  Neck: Supple, +intubated   Lungs: no rhonchi, no wheeze, no crackles  CVS: S1 S2 no M/R/G  Abdomen: no tenderness, no organomegaly, BS present  Neuro: Grossly intact  Skin: warm, dry  Ext: no cyanosis or clubbing, no edema  Access:      LABS/STUDIES  --------------------------------------------------------------------------------              11.8   8.78  >-----------<  60       [05-15-22 @ 00:59]              36.6     139  |  103  |  45  ----------------------------<  112      [05-15-22 @ 06:50]  3.9   |  16  |  3.72        Ca     8.0     [05-15-22 @ 06:50]      Mg     2.1     [05-15-22 @ 06:50]      Phos  3.6     [05-15-22 @ 06:50]    TPro  4.6  /  Alb  3.2  /  TBili  10.7  /  DBili  x   /  AST  3804  /  ALT  4417  /  AlkPhos  190  [05-15-22 @ 06:50]    PT/INR: PT 33.2 , INR 2.86       [05-15-22 @ 00:59]  PTT: 48.7       [05-15-22 @ 00:59]      Creatinine Trend:  SCr 3.72 [05-15 @ 06:50]  SCr 4.32 [05-15 @ 00:59]  SCr 5.69 [05-14 @ 17:34]  SCr 5.83 [05-14 @ 00:55]  SCr 4.99 [05-13 @ 13:20]    Urinalysis - [05-11-22 @ 22:13]      Color Yellow / Appearance Clear / SG 1.021 / pH 6.0      Gluc >= 1000 mg/dL / Ketone Trace  / Bili Negative / Urobili <2 mg/dL       Blood Large / Protein 30 mg/dL / Leuk Est Negative / Nitrite Negative       / WBC 2 / Hyaline 3 / Gran  / Sq Epi  / Non Sq Epi 0 / Bacteria Negative      TSH 4.14      [05-11-22 @ 22:39]  Lipid: chol 118, TG 77, HDL 19, LDL --      [05-11-22 @ 22:39]    HCV 0.07, Nonreact      [05-12-22 @ 01:06]     [facility] DIVISION OF KIDNEY DISEASES AND HYPERTENSION -- FOLLOW UP NOTE  --------------------------------------------------------------------------------  Chief Complaint: cardiogenic shock    24 hour events/subjective:  on crrt  Keeping net even as he remains pressor dependent      PAST HISTORY  --------------------------------------------------------------------------------  No significant changes to PMH, PSH, FHx, SHx, unless otherwise noted    ALLERGIES & MEDICATIONS  --------------------------------------------------------------------------------  Allergies    No Known Allergies    Intolerances      Standing Inpatient Medications  artificial  tears Solution 1 Drop(s) Both EYES three times a day  aspirin  chewable 81 milliGRAM(s) Enteral Tube daily  cefepime   IVPB 1000 milliGRAM(s) IV Intermittent every 24 hours  chlorhexidine 0.12% Liquid 15 milliLiter(s) Oral Mucosa every 12 hours  chlorhexidine 4% Liquid 1 Application(s) Topical <User Schedule>  CRRT Treatment    <Continuous>  dextrose 10%. 1000 milliLiter(s) IV Continuous <Continuous>  dextrose 50% Injectable 25 Gram(s) IV Push once  dextrose 50% Injectable 12.5 Gram(s) IV Push once  dextrose 50% Injectable 25 Gram(s) IV Push once  dextrose Oral Gel 15 Gram(s) Oral once  DOBUTamine Infusion 3 MICROgram(s)/kG/Min IV Continuous <Continuous>  glucagon  Injectable 1 milliGRAM(s) IntraMuscular once  heparin  Infusion Syringe 300 Unit(s)/Hr CRRT <Continuous>  insulin lispro (ADMELOG) corrective regimen sliding scale   SubCutaneous every 4 hours  multivitamin/minerals/iron Oral Solution (CENTRUM) 15 milliLiter(s) Oral daily  norepinephrine Infusion 0.05 MICROgram(s)/kG/Min IV Continuous <Continuous>  pantoprazole  Injectable 40 milliGRAM(s) IV Push two times a day  PrismaSATE Dialysate BGK 4 / 2.5 5000 milliLiter(s) CRRT <Continuous>  PrismaSOL Filtration BGK 4 / 2.5 5000 milliLiter(s) CRRT <Continuous>  PrismaSOL Filtration BGK 4 / 2.5 5000 milliLiter(s) CRRT <Continuous>  propofol Infusion 10 MICROgram(s)/kG/Min IV Continuous <Continuous>  vasopressin Infusion 0.04 Unit(s)/Min IV Continuous <Continuous>    PRN Inpatient Medications      REVIEW OF SYSTEMS  --------------------------------------------------------------------------------  Unable to obtain    VITALS/PHYSICAL EXAM  --------------------------------------------------------------------------------  T(C): 37.1 (05-15-22 @ 07:00), Max: 37.2 (05-14-22 @ 11:00)  HR: 81 (05-15-22 @ 08:00) (75 - 110)  BP: --  RR: 21 (05-15-22 @ 08:00) (16 - 27)  SpO2: 98% (05-15-22 @ 08:00) (92% - 100%)  Wt(kg): --        05-14-22 @ 07:01  -  05-15-22 @ 07:00  --------------------------------------------------------  IN: 3598 mL / OUT: 4373 mL / NET: -775 mL    05-15-22 @ 07:01  -  05-15-22 @ 08:49  --------------------------------------------------------  IN: 99.8 mL / OUT: 115 mL / NET: -15.2 mL      Physical Exam:  	PHYSICAL EXAM: vital signs as above  in no apparent distress  Neck: Supple, +intubated   Lungs: no rhonchi, no wheeze, no crackles  CVS: S1 S2 no M/R/G  Abdomen: no tenderness, no organomegaly, BS present  Neuro: Grossly intact  Skin: warm, dry  Ext: no cyanosis or clubbing, no edema  Access:      LABS/STUDIES  --------------------------------------------------------------------------------              11.8   8.78  >-----------<  60       [05-15-22 @ 00:59]              36.6     139  |  103  |  45  ----------------------------<  112      [05-15-22 @ 06:50]  3.9   |  16  |  3.72        Ca     8.0     [05-15-22 @ 06:50]      Mg     2.1     [05-15-22 @ 06:50]      Phos  3.6     [05-15-22 @ 06:50]    TPro  4.6  /  Alb  3.2  /  TBili  10.7  /  DBili  x   /  AST  3804  /  ALT  4417  /  AlkPhos  190  [05-15-22 @ 06:50]    PT/INR: PT 33.2 , INR 2.86       [05-15-22 @ 00:59]  PTT: 48.7       [05-15-22 @ 00:59]      Creatinine Trend:  SCr 3.72 [05-15 @ 06:50]  SCr 4.32 [05-15 @ 00:59]  SCr 5.69 [05-14 @ 17:34]  SCr 5.83 [05-14 @ 00:55]  SCr 4.99 [05-13 @ 13:20]    Urinalysis - [05-11-22 @ 22:13]      Color Yellow / Appearance Clear / SG 1.021 / pH 6.0      Gluc >= 1000 mg/dL / Ketone Trace  / Bili Negative / Urobili <2 mg/dL       Blood Large / Protein 30 mg/dL / Leuk Est Negative / Nitrite Negative       / WBC 2 / Hyaline 3 / Gran  / Sq Epi  / Non Sq Epi 0 / Bacteria Negative      TSH 4.14      [05-11-22 @ 22:39]  Lipid: chol 118, TG 77, HDL 19, LDL --      [05-11-22 @ 22:39]    HCV 0.07, Nonreact      [05-12-22 @ 01:06]

## 2022-05-15 NOTE — PROGRESS NOTE ADULT - SUBJECTIVE AND OBJECTIVE BOX
ALAN DE LA ROSA  [id number]  Patient is a 71y old  Male who presents with a chief complaint of Cardiogenic Shock (14 May 2022 23:34)    HPI:   History obtained from wife, daughter and charts.     Pt is a 71M with PMHx DMII, CHF (30%), AICD 1yr ago, HLD. He is a former smoker (quit approx 30 years ago). Pt received his second COVID 19 booster shot this week.     Per daughter, pt began having some new lower extremity edema last week and later developed a night time cough. His symptoms improved after a few days until today when his cough worsened and he became extremely tachypneic and short of breath. Daughter became very concerned and brought him to ED.     In the ED, pt was tachypneic, with systolic BP in the 70s maxed on levo. He was also on Bipap with RR 40s, TV 200s and not mentating well. Lactate was reportedly 11 and pH was 7. In the ED, he was started on milrinone and dobutamine and then shock team was activated. The patient was taken to cath lab and an Impella was placed.     Upon arrival to CICU, pt was on Dobutamine @10, Milrinone @.125, Vaso 0.08, Levo @ 1.0, intubated on PEEP 8 and fio2 100%   (11 May 2022 22:58)      Hospital course:    Today:    Physical Exam:  Vital Signs Last 24 Hrs  T(C): 36.9 (15 May 2022 04:00), Max: 37.2 (14 May 2022 11:00)  T(F): 98.4 (15 May 2022 04:00), Max: 99 (14 May 2022 11:00)  HR: 81 (15 May 2022 06:30) (75 - 110)  BP: --  BP(mean): --  RR: 21 (15 May 2022 06:30) (16 - 27)  SpO2: 98% (15 May 2022 06:30) (89% - 100%)    PHYSICAL EXAM:  Constitutional: Patient laying in bed, NAD  Head: Atraumatic, normocephalic  Eyes: No scleral icterus. PERRLA. EOMI  ENMT: Moist mucous membrane. Uvula midline  Neck: Supple, No JVD  Respiratory: CTA B/L. No wheezes, rales or rhonchi   Cardiovascular: S1/S2. No murmurs, rubs, or gallops.  Gastrointestinal: Nondistended, BSx4, soft, nontender.  Extremities: Moves all extremities. Warm, no edema, nontender  Vascular: 2+ DP/PT pulses B/L   Neurological: A&Ox3. Follows commands. No focal deficits.   Skin: No rashes on exposed skin     ============================I/O===========================   I&O's Detail    14 May 2022 07:01  -  15 May 2022 07:00  --------------------------------------------------------  IN:    dextrose 10%: 360 mL    DOBUTamine: 151.8 mL    IV PiggyBack: 400 mL    Norepinephrine: 1188.1 mL    Other (mL): 700 mL    Plasma: 300 mL    Platelets - Single Donor: 225 mL    Propofol: 95.1 mL    Vasopressin: 138 mL    Vital1.5: 40 mL  Total IN: 3598 mL    OUT:    Indwelling Catheter - Urethral (mL): 240 mL    Nasogastric/Oral tube (mL): 700 mL    Other (mL): 1683 mL    Other (mL): 700 mL    Rectal Tube (mL): 1050 mL  Total OUT: 4373 mL    Total NET: -775 mL        ============================ LABS =========================                        11.8   8.78  )-----------( 60       ( 15 May 2022 00:59 )             36.6     05-15    142  |  103  |  55<H>  ----------------------------<  102<H>  3.9   |  16<L>  |  4.32<H>    Ca    7.7<L>      15 May 2022 00:59  Phos  4.2     05-15  Mg     2.1     05-15    TPro  4.6<L>  /  Alb  3.2<L>  /  TBili  10.3<H>  /  DBili  x   /  AST  4957<H>  /  ALT  4838<H>  /  AlkPhos  188<H>  05-15    LIVER FUNCTIONS - ( 15 May 2022 00:59 )  Alb: 3.2 g/dL / Pro: 4.6 g/dL / ALK PHOS: 188 U/L / ALT: 4838 U/L / AST: 4957 U/L / GGT: x           PT/INR - ( 15 May 2022 00:59 )   PT: 33.2 sec;   INR: 2.86 ratio         PTT - ( 15 May 2022 00:59 )  PTT:48.7 sec  ABG - ( 15 May 2022 06:44 )  pH, Arterial: 7.37  pH, Blood: x     /  pCO2: 31    /  pO2: 115   / HCO3: 18    / Base Excess: -6.3  /  SaO2: 98.0                ======================Micro/Rad/Cardio=================  Culture: Reviewed   CXR: Reviewed  Echo:Reviewed  ======================================================  PAST MEDICAL & SURGICAL HISTORY:  Hypertension      Diabetes  A1C 6.8  on admission      Former smoker      HLD (hyperlipidemia)      CAD (coronary artery disease)  reports angiogram - 1 year ago - pt reports non obstructive  at [facility]      CHF (congestive heart failure)  (EF 30%)      H/O fracture of wrist  and left ankle (ORIF ankle) following fall        ====================ASSESSMENT ==============  72 y/o M w/ CAD,CHF(EF 30%) s/p AICD, dm2 admitted w/ SOB and malaise hypotensive w/ elevated LA in setting of cardiogenic and distributive shock               Plan:  ====================== NEUROLOGY=====================  Sedation   --continue propofol, RASS goal 0 to -1  --daily sedation vacations     propofol Infusion 10 MICROgram(s)/kG/Min (4.41 mL/Hr) IV Continuous <Continuous>    ==================== RESPIRATORY======================  acute hypoxic respiratory failure   --continue ventilator support  --defer SBT given hemodynamic instability   --wean fio2 as tolerated   --vent bundle    Mechanical Ventilation:  Mode: AC/ CMV (Assist Control/ Continuous Mandatory Ventilation)  RR (machine): 16  TV (machine): 450  FiO2: 30  PEEP: 5  ITime: 1  MAP: 11  PIP: 27      ====================CARDIOVASCULAR==================  mixed shock  --continue dobutamine for inotropic support, wean as able   --wean vasopressors for map > 65  --trend end organ perfusion, lactate, cardiac indices   --TTE on 5/11 with EF 5%, consider repeating?    CAD  --continue ASA  --cardiac enzymes downtrended, no need to follow    HLD  --hold statin for transaminitis      aspirin  chewable 81 milliGRAM(s) Enteral Tube daily  DOBUTamine Infusion 3 MICROgram(s)/kG/Min (6.62 mL/Hr) IV Continuous <Continuous>  norepinephrine Infusion 0.05 MICROgram(s)/kG/Min (6.89 mL/Hr) IV Continuous <Continuous>  vasopressin Infusion 0.04 Unit(s)/Min (2.4 mL/Hr) IV Continuous <Continuous>  ===================HEMATOLOGIC/ONC ===================  thrombocytopenia   --platelets slowly improving  --will give 1 platelet for line placement  --heme/onc consult, smear unremarkable, felt to be 2/2 hemolysis     acute illness coagulopathy  --INR elevated, likely in setting of shock liver  --will transfuse FFP x 1 for line placement      heparin  Infusion Syringe 300 Unit(s)/Hr (0.6 mL/Hr) CRRT <Continuous>    ===================== RENAL =========================  ALISSA on CKD w/ unclear baseline Cr  I/O IN: 3243.4 mL / OUT: 1675 mL / NET: 1568.4 mL  - Renal consult called- non oliguric ALISSA  - Monitor BUN/CR and UO: Daughter addresses ok for CRRT if needed to give him a chance.   -CRRT started today, with 0ml/hr removal tolerating well       ==================== GASTROINTESTINAL===================  -as per nutrition vital AF @10cc/hr advanced as tolerated to 50ml x24hrs   - Start MVI     Elevated LFT in setting of shock liver w/ coagulopathy  - US of abd today  - Monitor LFT q 24hrs  - Hold hepatotoxic meds   - Continue Protonix for stress ulcer prophylaxis.     dextrose 10%. 1000 milliLiter(s) (30 mL/Hr) IV Continuous <Continuous>  multivitamin/minerals/iron Oral Solution (CENTRUM) 15 milliLiter(s) Oral daily  pantoprazole  Injectable 40 milliGRAM(s) IV Push two times a day    =======================    ENDOCRINE  =====================  DMT2  -  glycemic control with Admelog sliding scale and Glucagon PRN.   -Monitor blood glucose levels.       dextrose 50% Injectable 25 Gram(s) IV Push once  dextrose 50% Injectable 12.5 Gram(s) IV Push once  dextrose 50% Injectable 25 Gram(s) IV Push once  dextrose Oral Gel 15 Gram(s) Oral once  glucagon  Injectable 1 milliGRAM(s) IntraMuscular once  insulin lispro (ADMELOG) corrective regimen sliding scale   SubCutaneous every 4 hours      ========================INFECTIOUS DISEASE================  Metapneumovirus +  - S/P. Vanco and cefepime, de-escalate if appropriate   -Vanco random level 21.1, holding vanco at this time   --f/u blood cultures   - wbc within normal limits         Patient requires continuous monitoring with bedside rhythm monitoring, arterial line, pulse oximetry, ventilator monitoring and intermittent blood gas analysis.  Care plan discussed with ICU care team.  Patient remained critical; required more than usual post op care; I have spent 35 minutes providing non-routine post op care, in addition to intial critical time provided by CICU attending _____ , re-evaluated multiple times during the day.         ALAN DE LA ROSA  [id number]  Patient is a 71y old  Male who presents with a chief complaint of Cardiogenic Shock (14 May 2022 23:34)    HPI:   History obtained from wife, daughter and charts.     Pt is a 71M with PMHx DMII, CHF (30%), AICD 1yr ago, HLD. He is a former smoker (quit approx 30 years ago). Pt received his second COVID 19 booster shot this week.     Per daughter, pt began having some new lower extremity edema last week and later developed a night time cough. His symptoms improved after a few days until today when his cough worsened and he became extremely tachypneic and short of breath. Daughter became very concerned and brought him to ED.     In the ED, pt was tachypneic, with systolic BP in the 70s maxed on levo. He was also on Bipap with RR 40s, TV 200s and not mentating well. Lactate was reportedly 11 and pH was 7. In the ED, he was started on milrinone and dobutamine and then shock team was activated. The patient was taken to cath lab and an Impella was placed.     Upon arrival to CICU, pt was on Dobutamine @10, Milrinone @.125, Vaso 0.08, Levo @ 1.0, intubated on PEEP 8 and fio2 100%   (11 May 2022 22:58)      Hospital course:    Today:    Physical Exam:  Vital Signs Last 24 Hrs  T(C): 36.9 (15 May 2022 04:00), Max: 37.2 (14 May 2022 11:00)  T(F): 98.4 (15 May 2022 04:00), Max: 99 (14 May 2022 11:00)  HR: 81 (15 May 2022 06:30) (75 - 110)  BP: --  BP(mean): --  RR: 21 (15 May 2022 06:30) (16 - 27)  SpO2: 98% (15 May 2022 06:30) (89% - 100%)    PHYSICAL EXAM:  Constitutional: Patient laying in bed, NAD  Head: Atraumatic, normocephalic  Eyes: No scleral icterus. PERRLA. EOMI  ENMT: Moist mucous membrane. Uvula midline  Neck: Supple, No JVD  Respiratory: CTA B/L. No wheezes, rales or rhonchi   Cardiovascular: S1/S2. No murmurs, rubs, or gallops.  Gastrointestinal: Nondistended, BSx4, soft, nontender.  Extremities: Moves all extremities. Warm, no edema, nontender  Vascular: 2+ DP/PT pulses B/L   Neurological: A&Ox3. Follows commands. No focal deficits.   Skin: No rashes on exposed skin     ============================I/O===========================   I&O's Detail    14 May 2022 07:01  -  15 May 2022 07:00  --------------------------------------------------------  IN:    dextrose 10%: 360 mL    DOBUTamine: 151.8 mL    IV PiggyBack: 400 mL    Norepinephrine: 1188.1 mL    Other (mL): 700 mL    Plasma: 300 mL    Platelets - Single Donor: 225 mL    Propofol: 95.1 mL    Vasopressin: 138 mL    Vital1.5: 40 mL  Total IN: 3598 mL    OUT:    Indwelling Catheter - Urethral (mL): 240 mL    Nasogastric/Oral tube (mL): 700 mL    Other (mL): 1683 mL    Other (mL): 700 mL    Rectal Tube (mL): 1050 mL  Total OUT: 4373 mL    Total NET: -775 mL        ============================ LABS =========================                        11.8   8.78  )-----------( 60       ( 15 May 2022 00:59 )             36.6     05-15    142  |  103  |  55<H>  ----------------------------<  102<H>  3.9   |  16<L>  |  4.32<H>    Ca    7.7<L>      15 May 2022 00:59  Phos  4.2     05-15  Mg     2.1     05-15    TPro  4.6<L>  /  Alb  3.2<L>  /  TBili  10.3<H>  /  DBili  x   /  AST  4957<H>  /  ALT  4838<H>  /  AlkPhos  188<H>  05-15    LIVER FUNCTIONS - ( 15 May 2022 00:59 )  Alb: 3.2 g/dL / Pro: 4.6 g/dL / ALK PHOS: 188 U/L / ALT: 4838 U/L / AST: 4957 U/L / GGT: x           PT/INR - ( 15 May 2022 00:59 )   PT: 33.2 sec;   INR: 2.86 ratio         PTT - ( 15 May 2022 00:59 )  PTT:48.7 sec  ABG - ( 15 May 2022 06:44 )  pH, Arterial: 7.37  pH, Blood: x     /  pCO2: 31    /  pO2: 115   / HCO3: 18    / Base Excess: -6.3  /  SaO2: 98.0                ======================Micro/Rad/Cardio=================  Culture: Reviewed   CXR: Reviewed  Echo:Reviewed  ======================================================  PAST MEDICAL & SURGICAL HISTORY:  Hypertension      Diabetes  A1C 6.8  on admission      Former smoker      HLD (hyperlipidemia)      CAD (coronary artery disease)  reports angiogram - 1 year ago - pt reports non obstructive  at [facility]      CHF (congestive heart failure)  (EF 30%)      H/O fracture of wrist  and left ankle (ORIF ankle) following fall        ====================ASSESSMENT ==============  70 y/o M w/ CAD,CHF(EF 30%) s/p AICD, dm2 admitted w/ SOB and malaise hypotensive w/ elevated LA in setting of cardiogenic and distributive shock               Plan:  ====================== NEUROLOGY=====================  Sedation   --continue propofol, RASS goal 0 to -1  --daily sedation vacations     propofol Infusion 10 MICROgram(s)/kG/Min (4.41 mL/Hr) IV Continuous <Continuous>    ==================== RESPIRATORY======================  acute hypoxic respiratory failure   --continue ventilator support  --defer SBT given hemodynamic instability   --wean fio2 as tolerated   --vent bundle    Mechanical Ventilation:  Mode: AC/ CMV (Assist Control/ Continuous Mandatory Ventilation)  RR (machine): 16  TV (machine): 450  FiO2: 30  PEEP: 5  ITime: 1  MAP: 11  PIP: 27      ====================CARDIOVASCULAR==================  mixed shock  --continue dobutamine for inotropic support, wean as able   --wean vasopressors for map > 65  --trend end organ perfusion, lactate, cardiac indices   --TTE on 5/11 with EF 5%, consider repeating?    CAD  --continue ASA  --cardiac enzymes downtrended, no need to follow    HLD  --hold statin for transaminitis      aspirin  chewable 81 milliGRAM(s) Enteral Tube daily  DOBUTamine Infusion 3 MICROgram(s)/kG/Min (6.62 mL/Hr) IV Continuous <Continuous>  norepinephrine Infusion 0.05 MICROgram(s)/kG/Min (6.89 mL/Hr) IV Continuous <Continuous>  vasopressin Infusion 0.04 Unit(s)/Min (2.4 mL/Hr) IV Continuous <Continuous>  ===================HEMATOLOGIC/ONC ===================  thrombocytopenia   --platelets slowly improving  --will give 1 platelet for line placement  --heme/onc consult, smear unremarkable, felt to be 2/2 hemolysis     acute illness coagulopathy  --INR elevated, likely in setting of shock liver  --will transfuse FFP x 1 for line placement      heparin  Infusion Syringe 300 Unit(s)/Hr (0.6 mL/Hr) CRRT <Continuous>    ===================== RENAL =========================  ALISSA on CKD w/ unclear baseline Cr  I/O IN: 3243.4 mL / OUT: 1675 mL / NET: 1568.4 mL  - Renal consult called- non oliguric ALISSA  - Monitor BUN/CR and UO: Daughter addresses ok for CRRT if needed to give him a chance.   -CRRT started today, with 0ml/hr removal tolerating well       ==================== GASTROINTESTINAL===================  -as per nutrition vital AF @10cc/hr advanced as tolerated to 50ml x24hrs   - Start MVI     Elevated LFT in setting of shock liver w/ coagulopathy  - US of abd today  - Monitor LFT q 24hrs  - Hold hepatotoxic meds   - Continue Protonix for stress ulcer prophylaxis.     dextrose 10%. 1000 milliLiter(s) (30 mL/Hr) IV Continuous <Continuous>  multivitamin/minerals/iron Oral Solution (CENTRUM) 15 milliLiter(s) Oral daily  pantoprazole  Injectable 40 milliGRAM(s) IV Push two times a day    =======================    ENDOCRINE  =====================  DMT2  -  glycemic control with Admelog sliding scale and Glucagon PRN.   -Monitor blood glucose levels.       dextrose 50% Injectable 25 Gram(s) IV Push once  dextrose 50% Injectable 12.5 Gram(s) IV Push once  dextrose 50% Injectable 25 Gram(s) IV Push once  dextrose Oral Gel 15 Gram(s) Oral once  glucagon  Injectable 1 milliGRAM(s) IntraMuscular once  insulin lispro (ADMELOG) corrective regimen sliding scale   SubCutaneous every 4 hours      ========================INFECTIOUS DISEASE================  Metapneumovirus +  - S/P. Vancox2 doses. C/w cefepime for empiric coverage.   -Vanco random level 21.1 5/14. MRSA neg, d/c vanco   - 5/11 BCX NGTD  - wbc within normal limits         Patient requires continuous monitoring with bedside rhythm monitoring, arterial line, pulse oximetry, ventilator monitoring and intermittent blood gas analysis.  Care plan discussed with ICU care team.  Patient remained critical; required more than usual post op care; I have spent 35 minutes providing non-routine post op care, in addition to intial critical time provided by CICU attending Dr. Xiong, re-evaluated multiple times during the day.         ALAN DE LA ROSA  MRN-[id number]  Patient is a 71y old  Male who presents with a chief complaint of Cardiogenic Shock (14 May 2022 23:34)    HPI:   History obtained from wife, daughter and charts.     Pt is a 71M with PMHx DMII, CHF (30%), AICD 1yr ago, HLD. He is a former smoker (quit approx 30 years ago). Pt received his second COVID 19 booster shot this week.     Per daughter, pt began having some new lower extremity edema last week and later developed a night time cough. His symptoms improved after a few days until today when his cough worsened and he became extremely tachypneic and short of breath. Daughter became very concerned and brought him to ED.     In the ED, pt was tachypneic, with systolic BP in the 70s maxed on levo. He was also on Bipap with RR 40s, TV 200s and not mentating well. Lactate was reportedly 11 and pH was 7. In the ED, he was started on milrinone and dobutamine and then shock team was activated. The patient was taken to cath lab and an Impella was placed.     Upon arrival to CICU, pt was on Dobutamine @10, Milrinone @.125, Vaso 0.08, Levo @ 1.0, intubated on PEEP 8 and fio2 100%   (11 May 2022 22:58)      Hospital course:    Today:    Physical Exam:  Vital Signs Last 24 Hrs  T(C): 36.9 (15 May 2022 04:00), Max: 37.2 (14 May 2022 11:00)  T(F): 98.4 (15 May 2022 04:00), Max: 99 (14 May 2022 11:00)  HR: 81 (15 May 2022 06:30) (75 - 110)  BP: --  BP(mean): --  RR: 21 (15 May 2022 06:30) (16 - 27)  SpO2: 98% (15 May 2022 06:30) (89% - 100%)    PHYSICAL EXAM:  Constitutional: Patient laying in bed, NAD  Head: Atraumatic, normocephalic  Eyes: No scleral icterus. PERRLA.  ENMT: Moist mucous membrane. ETT midline  Neck: Supple, No JVD  Respiratory: CTA B/L. No wheezes, rales or rhonchi   Cardiovascular: S1/S2. No murmurs, rubs, or gallops.  Gastrointestinal: Nondistended, BSx4, soft, nontender.  Extremities: Moves all extremities. Warm, no edema, nontender  Vascular: 2+ DP/PT pulses B/L   Neurological: Sedates, does not follow commands, withdraws from noxious stimuli    Skin: No rashes on exposed skin     ============================I/O===========================   I&O's Detail    14 May 2022 07:01  -  15 May 2022 07:00  --------------------------------------------------------  IN:    dextrose 10%: 360 mL    DOBUTamine: 151.8 mL    IV PiggyBack: 400 mL    Norepinephrine: 1188.1 mL    Other (mL): 700 mL    Plasma: 300 mL    Platelets - Single Donor: 225 mL    Propofol: 95.1 mL    Vasopressin: 138 mL    Vital1.5: 40 mL  Total IN: 3598 mL    OUT:    Indwelling Catheter - Urethral (mL): 240 mL    Nasogastric/Oral tube (mL): 700 mL    Other (mL): 1683 mL    Other (mL): 700 mL    Rectal Tube (mL): 1050 mL  Total OUT: 4373 mL    Total NET: -775 mL        ============================ LABS =========================                        11.8   8.78  )-----------( 60       ( 15 May 2022 00:59 )             36.6     05-15    142  |  103  |  55<H>  ----------------------------<  102<H>  3.9   |  16<L>  |  4.32<H>    Ca    7.7<L>      15 May 2022 00:59  Phos  4.2     05-15  Mg     2.1     05-15    TPro  4.6<L>  /  Alb  3.2<L>  /  TBili  10.3<H>  /  DBili  x   /  AST  4957<H>  /  ALT  4838<H>  /  AlkPhos  188<H>  05-15    LIVER FUNCTIONS - ( 15 May 2022 00:59 )  Alb: 3.2 g/dL / Pro: 4.6 g/dL / ALK PHOS: 188 U/L / ALT: 4838 U/L / AST: 4957 U/L / GGT: x           PT/INR - ( 15 May 2022 00:59 )   PT: 33.2 sec;   INR: 2.86 ratio         PTT - ( 15 May 2022 00:59 )  PTT:48.7 sec  ABG - ( 15 May 2022 06:44 )  pH, Arterial: 7.37  pH, Blood: x     /  pCO2: 31    /  pO2: 115   / HCO3: 18    / Base Excess: -6.3  /  SaO2: 98.0                ======================Micro/Rad/Cardio=================  Culture: Reviewed   CXR: Reviewed  Echo:Reviewed  ======================================================  PAST MEDICAL & SURGICAL HISTORY:  Hypertension      Diabetes  A1C 6.8  on admission      Former smoker      HLD (hyperlipidemia)      CAD (coronary artery disease)  reports angiogram - 1 year ago - pt reports non obstructive  at [facility]      CHF (congestive heart failure)  (EF 30%)      H/O fracture of wrist  and left ankle (ORIF ankle) following fall        ====================ASSESSMENT ==============  70 y/o M w/ CAD,CHF(EF 30%) s/p AICD, dm2 admitted w/ SOB and malaise hypotensive w/ elevated LA in setting of cardiogenic and distributive shock               Plan:  ====================== NEUROLOGY=====================  Sedation   --continue propofol, RASS goal 0 to -1  --daily sedation vacations     propofol Infusion 10 MICROgram(s)/kG/Min (4.41 mL/Hr) IV Continuous <Continuous>    ==================== RESPIRATORY======================  acute hypoxic respiratory failure   --continue ventilator support  --defer SBT given hemodynamic instability   --wean fio2 as tolerated   --vent bundle    Mechanical Ventilation:  Mode: AC/ CMV (Assist Control/ Continuous Mandatory Ventilation)  RR (machine): 16  TV (machine): 450  FiO2: 30  PEEP: 5  ITime: 1  MAP: 11  PIP: 27      ====================CARDIOVASCULAR==================  mixed shock  --continue dobutamine for inotropic support, wean as able   --wean vasopressors for map > 65  --trend end organ perfusion, lactate, cardiac indices   --TTE on 5/11 with EF 5%, consider repeating?    CAD  --continue ASA  --cardiac enzymes downtrended, no need to follow    HLD  --hold statin for transaminitis      aspirin  chewable 81 milliGRAM(s) Enteral Tube daily  DOBUTamine Infusion 3 MICROgram(s)/kG/Min (6.62 mL/Hr) IV Continuous <Continuous>  norepinephrine Infusion 0.05 MICROgram(s)/kG/Min (6.89 mL/Hr) IV Continuous <Continuous>  vasopressin Infusion 0.04 Unit(s)/Min (2.4 mL/Hr) IV Continuous <Continuous>  ===================HEMATOLOGIC/ONC ===================  thrombocytopenia   --platelets slowly improving  --will give 1 platelet for line placement  --heme/onc consult, smear unremarkable, felt to be 2/2 hemolysis     acute illness coagulopathy  --INR elevated, likely in setting of shock liver  --will transfuse FFP x 1 for line placement      heparin  Infusion Syringe 300 Unit(s)/Hr (0.6 mL/Hr) CRRT <Continuous>    ===================== RENAL =========================  ALISSA on CKD w/ unclear baseline Cr  I/O IN: 3243.4 mL / OUT: 1675 mL / NET: 1568.4 mL  - Renal consult called- non oliguric ALISSA  - Monitor BUN/CR and UO: Daughter addresses ok for CRRT if needed to give him a chance.   -CRRT started today, with 0ml/hr removal tolerating well       ==================== GASTROINTESTINAL===================  -as per nutrition vital AF @10cc/hr advanced as tolerated to 50ml x24hrs   - Start MVI     Elevated LFT in setting of shock liver w/ coagulopathy  - US of abd today  - Monitor LFT q 24hrs  - Hold hepatotoxic meds   - Continue Protonix for stress ulcer prophylaxis.     dextrose 10%. 1000 milliLiter(s) (30 mL/Hr) IV Continuous <Continuous>  multivitamin/minerals/iron Oral Solution (CENTRUM) 15 milliLiter(s) Oral daily  pantoprazole  Injectable 40 milliGRAM(s) IV Push two times a day    =======================    ENDOCRINE  =====================  DMT2  -  glycemic control with Admelog sliding scale and Glucagon PRN.   -Monitor blood glucose levels.       dextrose 50% Injectable 25 Gram(s) IV Push once  dextrose 50% Injectable 12.5 Gram(s) IV Push once  dextrose 50% Injectable 25 Gram(s) IV Push once  dextrose Oral Gel 15 Gram(s) Oral once  glucagon  Injectable 1 milliGRAM(s) IntraMuscular once  insulin lispro (ADMELOG) corrective regimen sliding scale   SubCutaneous every 4 hours      ========================INFECTIOUS DISEASE================  Metapneumovirus +  - S/P. Vancox2 doses. C/w cefepime for empiric coverage.   -Vanco random level 21.1 5/14. MRSA neg, d/c vanco   - 5/11 BCX NGTD  - wbc within normal limits         Patient requires continuous monitoring with bedside rhythm monitoring, arterial line, pulse oximetry, ventilator monitoring and intermittent blood gas analysis.  Care plan discussed with ICU care team.  Patient remained critical; required more than usual post op care; I have spent 35 minutes providing non-routine post op care, in addition to intial critical time provided by CICU attending Dr. Xiong, re-evaluated multiple times during the day.

## 2022-05-16 NOTE — PROGRESS NOTE ADULT - PROBLEM SELECTOR PLAN 2
- lasix challenge as above  - strict I/Os  - -If patient's output decreases or patient acidotic, may need CVVH  - nephrology consulted
Patient weaned off of dobutamine  Continues to required IV pressors   management per CICU
- Continue CRRT  - nephrology following

## 2022-05-16 NOTE — PROGRESS NOTE ADULT - SUBJECTIVE AND OBJECTIVE BOX
[facility] DIVISION OF KIDNEY DISEASES AND HYPERTENSION -- FOLLOW UP NOTE  --------------------------------------------------------------------------------  HPI: Patient is a 71 year old Male with PMHx DM, CHF (30%), AICD 1yr ago, HLD. who was admitted at [facility] on 5/11/22 with complaints of SOB, LLE swelling. In ER, pt. was tachypneic with systolic BP in 70s mmHg. Pt. was admitted for cardiogenic shock. Impella was placed on 5/11 and removed on 5/12. Over the course of hospital stay, Scr increased from 1.8 on admission to 4.13 today. Nephrology team was consulted for ALISSA management. On review of [facility], it was noted that Scr was 1.80 on 5/11 on admission. Scr increased to 4.13 on 5/13.     24 hour event:  Pt. seen and examined at bedside. Pt. is currently intubated and on mech vent. Also receiving IV vasopressors. No issues with CRRT overnight as pre discussion with RN at bedside.     PAST HISTORY  --------------------------------------------------------------------------------  No significant changes to PMH, PSH, FHx, SHx, unless otherwise noted    ALLERGIES & MEDICATIONS  --------------------------------------------------------------------------------  Allergies    No Known Allergies    Intolerances    Standing Inpatient Medications  artificial  tears Solution 1 Drop(s) Both EYES three times a day  aspirin  chewable 81 milliGRAM(s) Enteral Tube daily  cefepime   IVPB 2000 milliGRAM(s) IV Intermittent every 12 hours  chlorhexidine 0.12% Liquid 15 milliLiter(s) Oral Mucosa every 12 hours  chlorhexidine 4% Liquid 1 Application(s) Topical <User Schedule>  CRRT Treatment    <Continuous>  CRRT Treatment    <Continuous>  dextrose 50% Injectable 25 Gram(s) IV Push once  dextrose 50% Injectable 12.5 Gram(s) IV Push once  dextrose 50% Injectable 25 Gram(s) IV Push once  dextrose Oral Gel 15 Gram(s) Oral once  DOBUTamine Infusion 2 MICROgram(s)/kG/Min IV Continuous <Continuous>  glucagon  Injectable 1 milliGRAM(s) IntraMuscular once  heparin  Infusion Syringe 300 Unit(s)/Hr CRRT <Continuous>  heparin  Infusion Syringe 300 Unit(s)/Hr CRRT <Continuous>  insulin lispro (ADMELOG) corrective regimen sliding scale   SubCutaneous every 4 hours  metoclopramide 10 milliGRAM(s) Oral three times a day  multivitamin/minerals/iron Oral Solution (CENTRUM) 15 milliLiter(s) Oral daily  norepinephrine Infusion 0.05 MICROgram(s)/kG/Min IV Continuous <Continuous>  pantoprazole  Injectable 40 milliGRAM(s) IV Push two times a day  PrismaSATE Dialysate BGK 4 / 2.5 5000 milliLiter(s) CRRT <Continuous>  PrismaSATE Dialysate BGK 4 / 2.5 5000 milliLiter(s) CRRT <Continuous>  PrismaSOL Filtration BGK 4 / 2.5 5000 milliLiter(s) CRRT <Continuous>  PrismaSOL Filtration BGK 4 / 2.5 5000 milliLiter(s) CRRT <Continuous>  PrismaSOL Filtration BGK 4 / 2.5 5000 milliLiter(s) CRRT <Continuous>  PrismaSOL Filtration BGK 4 / 2.5 5000 milliLiter(s) CRRT <Continuous>  vasopressin Infusion 0.04 Unit(s)/Min IV Continuous <Continuous>    PRN Inpatient Medications    REVIEW OF SYSTEMS  --------------------------------------------------------------------------------  Unable to obtain ROS     VITALS/PHYSICAL EXAM  --------------------------------------------------------------------------------  T(C): 37.2 (05-16-22 @ 07:00), Max: 37.3 (05-15-22 @ 19:00)  HR: 84 (05-16-22 @ 09:15) (77 - 97)  BP: --  RR: 31 (05-16-22 @ 09:00) (24 - 43)  SpO2: 99% (05-16-22 @ 09:15) (96% - 100%)  Wt(kg): --    05-15-22 @ 07:01  -  05-16-22 @ 07:00  --------------------------------------------------------  IN: 2815.7 mL / OUT: 3072 mL / NET: -256.3 mL    05-16-22 @ 07:01  -  05-16-22 @ 09:40  --------------------------------------------------------  IN: 201.6 mL / OUT: 217 mL / NET: -15.4 mL    Physical Exam:  	Gen: intubated  	HEENT: MMM  	Pulm: CTA B/L  	CV: S1S2  	Abd: Soft, +BS   	Ext: No LE edema B/L  	Neuro: Sedated  	Skin: Warm and dry              Vascular: L femoral non tunneled HD cath     LABS/STUDIES  --------------------------------------------------------------------------------              12.5   9.98  >-----------<  55       [05-16-22 @ 04:24]              38.6     137  |  102  |  26  ----------------------------<  165      [05-16-22 @ 04:24]  4.1   |  14  |  2.11        Ca     8.2     [05-16-22 @ 04:24]      Mg     2.2     [05-16-22 @ 04:24]      Phos  2.8     [05-16-22 @ 04:24]    TPro  4.7  /  Alb  2.9  /  TBili  12.0  /  DBili  x   /  AST  1619  /  ALT  3047  /  AlkPhos  218  [05-16-22 @ 04:24]    PT/INR: PT 31.7 , INR 2.71       [05-16-22 @ 04:24]  PTT: 62.8       [05-16-22 @ 04:24]          [05-16-22 @ 04:24]    Creatinine Trend:  SCr 2.11 [05-16 @ 04:24]  SCr 2.29 [05-15 @ 22:26]  SCr 2.56 [05-15 @ 16:58]  SCr 3.72 [05-15 @ 06:50]  SCr 4.32 [05-15 @ 00:59]    Urinalysis - [05-11-22 @ 22:13]      Color Yellow / Appearance Clear / SG 1.021 / pH 6.0      Gluc >= 1000 mg/dL / Ketone Trace  / Bili Negative / Urobili <2 mg/dL       Blood Large / Protein 30 mg/dL / Leuk Est Negative / Nitrite Negative       / WBC 2 / Hyaline 3 / Gran  / Sq Epi  / Non Sq Epi 0 / Bacteria Negative    TSH 4.14      [05-11-22 @ 22:39]  Lipid: chol 118, TG 77, HDL 19, LDL --      [05-11-22 @ 22:39]    HCV 0.07, Nonreact      [05-12-22 @ 01:06] [facility] DIVISION OF KIDNEY DISEASES AND HYPERTENSION -- FOLLOW UP NOTE  --------------------------------------------------------------------------------  HPI: Patient is a 71 year old Male with PMHx DM, CHF (30%), AICD 1yr ago, HLD. who was admitted at [facility] on 5/11/22 with complaints of SOB, LLE swelling. In ER, pt. was tachypneic with systolic BP in 70s mmHg. Pt. was admitted for cardiogenic shock. Impella was placed on 5/11 and removed on 5/12. Over the course of hospital stay, Scr increased from 1.8 on admission to 4.13 today. Nephrology team was consulted for ALISSA management. On review of [facility], it was noted that Scr was 1.80 on 5/11 on admission. Scr increased to 4.13 on 5/13.     24 hour event:  Pt. seen and examined at bedside. Pt. is currently intubated and on mech vent. Also receiving IV vasopressors. No issues with CRRT overnight as pre discussion with RN at bedside.     PAST HISTORY  --------------------------------------------------------------------------------  No significant changes to PMH, PSH, FHx, SHx, unless otherwise noted    ALLERGIES & MEDICATIONS  --------------------------------------------------------------------------------  Allergies    No Known Allergies    Intolerances    Standing Inpatient Medications  artificial  tears Solution 1 Drop(s) Both EYES three times a day  aspirin  chewable 81 milliGRAM(s) Enteral Tube daily  cefepime   IVPB 2000 milliGRAM(s) IV Intermittent every 12 hours  chlorhexidine 0.12% Liquid 15 milliLiter(s) Oral Mucosa every 12 hours  chlorhexidine 4% Liquid 1 Application(s) Topical <User Schedule>  CRRT Treatment    <Continuous>  CRRT Treatment    <Continuous>  dextrose 50% Injectable 25 Gram(s) IV Push once  dextrose 50% Injectable 12.5 Gram(s) IV Push once  dextrose 50% Injectable 25 Gram(s) IV Push once  dextrose Oral Gel 15 Gram(s) Oral once  DOBUTamine Infusion 2 MICROgram(s)/kG/Min IV Continuous <Continuous>  glucagon  Injectable 1 milliGRAM(s) IntraMuscular once  heparin  Infusion Syringe 300 Unit(s)/Hr CRRT <Continuous>  heparin  Infusion Syringe 300 Unit(s)/Hr CRRT <Continuous>  insulin lispro (ADMELOG) corrective regimen sliding scale   SubCutaneous every 4 hours  metoclopramide 10 milliGRAM(s) Oral three times a day  multivitamin/minerals/iron Oral Solution (CENTRUM) 15 milliLiter(s) Oral daily  norepinephrine Infusion 0.05 MICROgram(s)/kG/Min IV Continuous <Continuous>  pantoprazole  Injectable 40 milliGRAM(s) IV Push two times a day  PrismaSATE Dialysate BGK 4 / 2.5 5000 milliLiter(s) CRRT <Continuous>  PrismaSATE Dialysate BGK 4 / 2.5 5000 milliLiter(s) CRRT <Continuous>  PrismaSOL Filtration BGK 4 / 2.5 5000 milliLiter(s) CRRT <Continuous>  PrismaSOL Filtration BGK 4 / 2.5 5000 milliLiter(s) CRRT <Continuous>  PrismaSOL Filtration BGK 4 / 2.5 5000 milliLiter(s) CRRT <Continuous>  PrismaSOL Filtration BGK 4 / 2.5 5000 milliLiter(s) CRRT <Continuous>  vasopressin Infusion 0.04 Unit(s)/Min IV Continuous <Continuous>    PRN Inpatient Medications    REVIEW OF SYSTEMS  --------------------------------------------------------------------------------  Unable to obtain ROS     VITALS/PHYSICAL EXAM  --------------------------------------------------------------------------------  T(C): 37.2 (05-16-22 @ 07:00), Max: 37.3 (05-15-22 @ 19:00)  HR: 84 (05-16-22 @ 09:15) (77 - 97)  BP: --  RR: 31 (05-16-22 @ 09:00) (24 - 43)  SpO2: 99% (05-16-22 @ 09:15) (96% - 100%)  Wt(kg): --    05-15-22 @ 07:01  -  05-16-22 @ 07:00  --------------------------------------------------------  IN: 2815.7 mL / OUT: 3072 mL / NET: -256.3 mL    05-16-22 @ 07:01  -  05-16-22 @ 09:40  --------------------------------------------------------  IN: 201.6 mL / OUT: 217 mL / NET: -15.4 mL    Physical Exam:  	Gen: intubated  	HEENT: MMM  	Pulm: CTA B/L  	CV: S1S2  	Abd: Soft, +BS   	Ext: No LE edema B/L  	Neuro: Sedated  	Skin: Warm and dry              Vascular: L femoral non tunneled HD cath                   LABS/STUDIES  --------------------------------------------------------------------------------              12.5   9.98  >-----------<  55       [05-16-22 @ 04:24]              38.6     137  |  102  |  26  ----------------------------<  165      [05-16-22 @ 04:24]  4.1   |  14  |  2.11        Ca     8.2     [05-16-22 @ 04:24]      Mg     2.2     [05-16-22 @ 04:24]      Phos  2.8     [05-16-22 @ 04:24]    TPro  4.7  /  Alb  2.9  /  TBili  12.0  /  DBili  x   /  AST  1619  /  ALT  3047  /  AlkPhos  218  [05-16-22 @ 04:24]    PT/INR: PT 31.7 , INR 2.71       [05-16-22 @ 04:24]  PTT: 62.8       [05-16-22 @ 04:24]          [05-16-22 @ 04:24]    Creatinine Trend:  SCr 2.11 [05-16 @ 04:24]  SCr 2.29 [05-15 @ 22:26]  SCr 2.56 [05-15 @ 16:58]  SCr 3.72 [05-15 @ 06:50]  SCr 4.32 [05-15 @ 00:59]    Urinalysis - [05-11-22 @ 22:13]      Color Yellow / Appearance Clear / SG 1.021 / pH 6.0      Gluc >= 1000 mg/dL / Ketone Trace  / Bili Negative / Urobili <2 mg/dL       Blood Large / Protein 30 mg/dL / Leuk Est Negative / Nitrite Negative       / WBC 2 / Hyaline 3 / Gran  / Sq Epi  / Non Sq Epi 0 / Bacteria Negative    TSH 4.14      [05-11-22 @ 22:39]  Lipid: chol 118, TG 77, HDL 19, LDL --      [05-11-22 @ 22:39]    HCV 0.07, Nonreact      [05-12-22 @ 01:06]

## 2022-05-16 NOTE — PROGRESS NOTE ADULT - PROBLEM SELECTOR PLAN 3
- continue to monitor with optimization of hemodynamics as above
- improving  - continue to monitor
management per CICU/heart failure

## 2022-05-16 NOTE — PROGRESS NOTE ADULT - NS MD NEURO CONDITIONS_SHOCK
Cardiogenic Shock
Cardiogenic Shock/Septic Shock
vasoplegia/Cardiogenic Shock

## 2022-05-16 NOTE — PROGRESS NOTE ADULT - SUBJECTIVE AND OBJECTIVE BOX
Patient is a 71y old  Male who presents with a chief complaint of Cardiogenic Shock (15 May 2022 20:14)      SUBJECTIVE / OVERNIGHT EVENTS:    MEDICATIONS  (STANDING):  artificial  tears Solution 1 Drop(s) Both EYES three times a day  aspirin  chewable 81 milliGRAM(s) Enteral Tube daily  cefepime   IVPB 2000 milliGRAM(s) IV Intermittent every 12 hours  chlorhexidine 0.12% Liquid 15 milliLiter(s) Oral Mucosa every 12 hours  chlorhexidine 4% Liquid 1 Application(s) Topical <User Schedule>  CRRT Treatment    <Continuous>  dextrose 50% Injectable 25 Gram(s) IV Push once  dextrose 50% Injectable 12.5 Gram(s) IV Push once  dextrose 50% Injectable 25 Gram(s) IV Push once  dextrose Oral Gel 15 Gram(s) Oral once  DOBUTamine Infusion 2 MICROgram(s)/kG/Min (4.41 mL/Hr) IV Continuous <Continuous>  glucagon  Injectable 1 milliGRAM(s) IntraMuscular once  heparin  Infusion Syringe 300 Unit(s)/Hr (0.6 mL/Hr) CRRT <Continuous>  insulin lispro (ADMELOG) corrective regimen sliding scale   SubCutaneous every 4 hours  metoclopramide 10 milliGRAM(s) Oral three times a day  multivitamin/minerals/iron Oral Solution (CENTRUM) 15 milliLiter(s) Oral daily  norepinephrine Infusion 0.05 MICROgram(s)/kG/Min (6.89 mL/Hr) IV Continuous <Continuous>  pantoprazole  Injectable 40 milliGRAM(s) IV Push two times a day  PrismaSATE Dialysate BGK 4 / 2.5 5000 milliLiter(s) (1500 mL/Hr) CRRT <Continuous>  PrismaSOL Filtration BGK 4 / 2.5 5000 milliLiter(s) (800 mL/Hr) CRRT <Continuous>  PrismaSOL Filtration BGK 4 / 2.5 5000 milliLiter(s) (200 mL/Hr) CRRT <Continuous>  propofol Infusion 10 MICROgram(s)/kG/Min (4.41 mL/Hr) IV Continuous <Continuous>  vasopressin Infusion 0.04 Unit(s)/Min (2.4 mL/Hr) IV Continuous <Continuous>    MEDICATIONS  (PRN):      PHYSICAL EXAM:  Vital Signs Last 24 Hrs  T(C): 37.2 (16 May 2022 07:00), Max: 37.3 (15 May 2022 19:00)  T(F): 99 (16 May 2022 07:00), Max: 99.1 (15 May 2022 19:00)  HR: 96 (16 May 2022 07:00) (76 - 97)  BP: --  BP(mean): --  RR: 31 (16 May 2022 07:00) (23 - 43)  SpO2: 100% (16 May 2022 07:00) (96% - 100%)    CONSTITUTIONAL: NAD, well-developed  EYES: conjunctiva and sclera clear  ENMT: Moist oral mucosa  NECK: Supple, no palpable masses  RESPIRATORY: Normal WOB; lungs are CTA b/l  CARDIOVASCULAR: RRR; S1/S2 present; no m/r/g; No LE edema; Peripheral pulses are 2+ bilaterally  ABDOMEN: Soft, nontender, normoactive BS, no rebound/guarding; No hepatosplenomegaly  MUSCULOSKELETAL:  moving all extremities  NEUROLOGY: awake, A&O to person, place, and time  SKIN: No rashes  ----  I&O's Summary    15 May 2022 07:01  -  16 May 2022 07:00  --------------------------------------------------------  IN: 2815.7 mL / OUT: 3072 mL / NET: -256.3 mL      ----  LABS:                        12.5   9.98  )-----------( 55       ( 16 May 2022 04:24 )             38.6     ----  05-16    137  |  102  |  26<H>  ----------------------------<  165<H>  4.1   |  14<L>  |  2.11<H>    Ca    8.2<L>      16 May 2022 04:24  Phos  2.8     05-16  Mg     2.2     05-16    TPro  4.7<L>  /  Alb  2.9<L>  /  TBili  12.0<H>  /  DBili  x   /  AST  1619<H>  /  ALT  3047<H>  /  AlkPhos  218<H>  05-16    ----  PT/INR - ( 16 May 2022 04:24 )   PT: 31.7 sec;   INR: 2.71 ratio         PTT - ( 16 May 2022 04:24 )  PTT:62.8 sec  ----  CARDIAC MARKERS ( 16 May 2022 04:24 )  x     / x     / 306 U/L / x     / 5.9 ng/mL      ----    ----    Culture - Sputum (collected 14 May 2022 17:34)  Source: ET Tube ET Tube  Gram Stain (14 May 2022 23:07):    Numerous polymorphonuclear leukocytes per low power field    Rare Squamous epithelial cells per low power field    Few Gram positive cocci in pairs per oil power field  Preliminary Report (15 May 2022 15:32):    Moderate Staphylococcus aureus    Normal Respiratory Shira absent        RADIOLOGY & ADDITIONAL TESTS:  Results Reviewed:   Imaging Personally Reviewed:  Electrocardiogram Personally Reviewed: Patient is a 71y old  Male who presents with a chief complaint of Cardiogenic Shock (15 May 2022 20:14)      SUBJECTIVE / OVERNIGHT EVENTS:  - overnight dobutamine discontinued; continued anuric renal failure on CVV  -     MEDICATIONS  (STANDING):  artificial  tears Solution 1 Drop(s) Both EYES three times a day  aspirin  chewable 81 milliGRAM(s) Enteral Tube daily  cefepime   IVPB 2000 milliGRAM(s) IV Intermittent every 12 hours  chlorhexidine 0.12% Liquid 15 milliLiter(s) Oral Mucosa every 12 hours  chlorhexidine 4% Liquid 1 Application(s) Topical <User Schedule>  CRRT Treatment    <Continuous>  dextrose 50% Injectable 25 Gram(s) IV Push once  dextrose 50% Injectable 12.5 Gram(s) IV Push once  dextrose 50% Injectable 25 Gram(s) IV Push once  dextrose Oral Gel 15 Gram(s) Oral once  DOBUTamine Infusion 2 MICROgram(s)/kG/Min (4.41 mL/Hr) IV Continuous <Continuous>  glucagon  Injectable 1 milliGRAM(s) IntraMuscular once  heparin  Infusion Syringe 300 Unit(s)/Hr (0.6 mL/Hr) CRRT <Continuous>  insulin lispro (ADMELOG) corrective regimen sliding scale   SubCutaneous every 4 hours  metoclopramide 10 milliGRAM(s) Oral three times a day  multivitamin/minerals/iron Oral Solution (CENTRUM) 15 milliLiter(s) Oral daily  norepinephrine Infusion 0.05 MICROgram(s)/kG/Min (6.89 mL/Hr) IV Continuous <Continuous>  pantoprazole  Injectable 40 milliGRAM(s) IV Push two times a day  PrismaSATE Dialysate BGK 4 / 2.5 5000 milliLiter(s) (1500 mL/Hr) CRRT <Continuous>  PrismaSOL Filtration BGK 4 / 2.5 5000 milliLiter(s) (800 mL/Hr) CRRT <Continuous>  PrismaSOL Filtration BGK 4 / 2.5 5000 milliLiter(s) (200 mL/Hr) CRRT <Continuous>  propofol Infusion 10 MICROgram(s)/kG/Min (4.41 mL/Hr) IV Continuous <Continuous>  vasopressin Infusion 0.04 Unit(s)/Min (2.4 mL/Hr) IV Continuous <Continuous>    MEDICATIONS  (PRN):      PHYSICAL EXAM:  Vital Signs Last 24 Hrs  T(C): 37.2 (16 May 2022 07:00), Max: 37.3 (15 May 2022 19:00)  T(F): 99 (16 May 2022 07:00), Max: 99.1 (15 May 2022 19:00)  HR: 96 (16 May 2022 07:00) (76 - 97)  BP: --  BP(mean): --  RR: 31 (16 May 2022 07:00) (23 - 43)  SpO2: 100% (16 May 2022 07:00) (96% - 100%)    CONSTITUTIONAL: NAD, well-developed  EYES: conjunctiva and sclera clear  ENMT: Moist oral mucosa  NECK: Supple, no palpable masses  RESPIRATORY: Normal WOB; lungs are CTA b/l  CARDIOVASCULAR: RRR; S1/S2 present; no m/r/g; No LE edema; Peripheral pulses are 2+ bilaterally  ABDOMEN: Soft, nontender, normoactive BS, no rebound/guarding; No hepatosplenomegaly  MUSCULOSKELETAL:  moving all extremities  NEUROLOGY: awake, A&O to person, place, and time  SKIN: No rashes  ----  I&O's Summary    15 May 2022 07:01  -  16 May 2022 07:00  --------------------------------------------------------  IN: 2815.7 mL / OUT: 3072 mL / NET: -256.3 mL      ----  LABS:                        12.5   9.98  )-----------( 55       ( 16 May 2022 04:24 )             38.6     ----  05-16    137  |  102  |  26<H>  ----------------------------<  165<H>  4.1   |  14<L>  |  2.11<H>    Ca    8.2<L>      16 May 2022 04:24  Phos  2.8     05-16  Mg     2.2     05-16    TPro  4.7<L>  /  Alb  2.9<L>  /  TBili  12.0<H>  /  DBili  x   /  AST  1619<H>  /  ALT  3047<H>  /  AlkPhos  218<H>  05-16    ----  PT/INR - ( 16 May 2022 04:24 )   PT: 31.7 sec;   INR: 2.71 ratio         PTT - ( 16 May 2022 04:24 )  PTT:62.8 sec  ----  CARDIAC MARKERS ( 16 May 2022 04:24 )  x     / x     / 306 U/L / x     / 5.9 ng/mL      ----    ----    Culture - Sputum (collected 14 May 2022 17:34)  Source: ET Tube ET Tube  Gram Stain (14 May 2022 23:07):    Numerous polymorphonuclear leukocytes per low power field    Rare Squamous epithelial cells per low power field    Few Gram positive cocci in pairs per oil power field  Preliminary Report (15 May 2022 15:32):    Moderate Staphylococcus aureus    Normal Respiratory Shira absent        RADIOLOGY & ADDITIONAL TESTS:  Results Reviewed:   Imaging Personally Reviewed:  Electrocardiogram Personally Reviewed: Patient is a 71y old  Male who presents with a chief complaint of Cardiogenic Shock (15 May 2022 20:14)      SUBJECTIVE / OVERNIGHT EVENTS:  - overnight dobutamine discontinued; continued anuric renal failure on CVV  - I/O: net (-) 200cc; no UOP    MEDICATIONS  (STANDING):  artificial  tears Solution 1 Drop(s) Both EYES three times a day  aspirin  chewable 81 milliGRAM(s) Enteral Tube daily  cefepime   IVPB 2000 milliGRAM(s) IV Intermittent every 12 hours  chlorhexidine 0.12% Liquid 15 milliLiter(s) Oral Mucosa every 12 hours  chlorhexidine 4% Liquid 1 Application(s) Topical <User Schedule>  CRRT Treatment    <Continuous>  dextrose 50% Injectable 25 Gram(s) IV Push once  dextrose 50% Injectable 12.5 Gram(s) IV Push once  dextrose 50% Injectable 25 Gram(s) IV Push once  dextrose Oral Gel 15 Gram(s) Oral once  DOBUTamine Infusion 2 MICROgram(s)/kG/Min (4.41 mL/Hr) IV Continuous <Continuous>  glucagon  Injectable 1 milliGRAM(s) IntraMuscular once  heparin  Infusion Syringe 300 Unit(s)/Hr (0.6 mL/Hr) CRRT <Continuous>  insulin lispro (ADMELOG) corrective regimen sliding scale   SubCutaneous every 4 hours  metoclopramide 10 milliGRAM(s) Oral three times a day  multivitamin/minerals/iron Oral Solution (CENTRUM) 15 milliLiter(s) Oral daily  norepinephrine Infusion 0.05 MICROgram(s)/kG/Min (6.89 mL/Hr) IV Continuous <Continuous>  pantoprazole  Injectable 40 milliGRAM(s) IV Push two times a day  PrismaSATE Dialysate BGK 4 / 2.5 5000 milliLiter(s) (1500 mL/Hr) CRRT <Continuous>  PrismaSOL Filtration BGK 4 / 2.5 5000 milliLiter(s) (800 mL/Hr) CRRT <Continuous>  PrismaSOL Filtration BGK 4 / 2.5 5000 milliLiter(s) (200 mL/Hr) CRRT <Continuous>  propofol Infusion 10 MICROgram(s)/kG/Min (4.41 mL/Hr) IV Continuous <Continuous>  vasopressin Infusion 0.04 Unit(s)/Min (2.4 mL/Hr) IV Continuous <Continuous>    MEDICATIONS  (PRN):      PHYSICAL EXAM:  Vital Signs Last 24 Hrs  T(C): 37.2 (16 May 2022 07:00), Max: 37.3 (15 May 2022 19:00)  T(F): 99 (16 May 2022 07:00), Max: 99.1 (15 May 2022 19:00)  HR: 96 (16 May 2022 07:00) (76 - 97)  BP: --  BP(mean): --  RR: 31 (16 May 2022 07:00) (23 - 43)  SpO2: 100% (16 May 2022 07:00) (96% - 100%)    Constitutional: intubated; intermittently moving spontaneously  Eyes: scleral icterus  ENMT: Moist mucous membrane. ETT midline  Respiratory: Intubated (16/450/5/30). CTA in anterior fields. No wheezes, rales or rhonchi   Cardiovascular: S1/S2. No murmurs, rubs, or gallops.  Gastrointestinal: Nondistended, BSx4, soft  Extremities: trace b/l pitting edema bilaterally  Vascular: 2+ DP/PT pulses B/L   Neurological: Sedated, intermittently moving spontaneously; does not follow commands, withdraws from noxious stimuli      ----  I&O's Summary    15 May 2022 07:01  -  16 May 2022 07:00  --------------------------------------------------------  IN: 2815.7 mL / OUT: 3072 mL / NET: -256.3 mL      ----  LABS:                        12.5   9.98  )-----------( 55       ( 16 May 2022 04:24 )             38.6     ----  05-16    137  |  102  |  26<H>  ----------------------------<  165<H>  4.1   |  14<L>  |  2.11<H>    Ca    8.2<L>      16 May 2022 04:24  Phos  2.8     05-16  Mg     2.2     05-16    TPro  4.7<L>  /  Alb  2.9<L>  /  TBili  12.0<H>  /  DBili  x   /  AST  1619<H>  /  ALT  3047<H>  /  AlkPhos  218<H>  05-16    ----  PT/INR - ( 16 May 2022 04:24 )   PT: 31.7 sec;   INR: 2.71 ratio         PTT - ( 16 May 2022 04:24 )  PTT:62.8 sec  ----  CARDIAC MARKERS ( 16 May 2022 04:24 )  x     / x     / 306 U/L / x     / 5.9 ng/mL      ----    ----    Culture - Sputum (collected 14 May 2022 17:34)  Source: ET Tube ET Tube  Gram Stain (14 May 2022 23:07):    Numerous polymorphonuclear leukocytes per low power field    Rare Squamous epithelial cells per low power field    Few Gram positive cocci in pairs per oil power field  Preliminary Report (15 May 2022 15:32):    Moderate Staphylococcus aureus    Normal Respiratory Shira absent

## 2022-05-16 NOTE — PROGRESS NOTE ADULT - ASSESSMENT
71M w/ CAD,CHF(EF 30%) s/p AICD, T2DM admitted with cardiogenic shock 2/2 volume overload w/course c/b anuric renal failure on CVVHD    Plan:  ====================== NEUROLOGY=====================  Intubated  - off propofol since 5/15 AM  - pt opening eyes to verbal stimuli; also with spontaneous movement; but not following meaningful commands  - no lyte derangements, acidemia; no infectious etiology  - monitor mentation off propofol; precedex for comfort while intubated  - will consider CT Head if no improvement in mentation    ==================== RESPIRATORY======================  hypoxic respiratory failure 2/2 cardiogenic shock  - 16/450/5/30; decrease TV to 400 based on IBW  --continue ventilator support  --defer SBT given hemodynamic instability  --wean fio2 as tolerated     ====================CARDIOVASCULAR==================  mixed shock (cardiogenic and distributive) 2/2 viral syndrome  - persistently elevated lactate (>7), not clearing despite improving liver function  - off dobutamine for inotropic support; CO 4.5/ CI 2.4  - impella 5/11; removed 5/12  - wean vasopressors for map > 65  - trend end organ perfusion, lactate, cardiac indices  - TTE on 5/11 with EF 11%    CAD  --continue ASA  --cardiac enzymes downtrended, no need to follow    HLD  --hold statin for transaminitis    ===================== RENAL =========================  ALISSA on CKD w/ unclear baseline Cr  - Renal consult called- non oliguric ALISSA  - Monitor BUN/CR and UO: Daughter addresses ok for CRRT if needed to give him a chance.   - keeping net even; with 0ml/hr removal tolerating well though without clinical improvement      ==================== GASTROINTESTINAL===================  -as per nutrition vital AF @10cc/hr advanced as tolerated to 50ml x24hrs   - Start MVI    Elevated LFT in setting of shock liver w/ coagulopathy; improving  - US of abd today  - Monitor LFT q 24hrs  - Hold hepatotoxic meds   - Continue Protonix for stress ulcer prophylaxis.     =======================    ENDOCRINE  =====================  DMT2  -  glycemic control with Admelog sliding scale   - Monitor blood glucose levels.     ========================INFECTIOUS DISEASE================  Metapneumovirus +  - S/P. Vancox2 doses. s/p 5d course of empiric cefepime (5/11 - 5/16)  - Vanco random level 21.1 5/14. MRSA neg, d/c vanco   - 5/11 BCX NGTD  - wbc within normal limits       ===================HEMATOLOGIC/ONC ===================  thrombocytopenia, chronic  - platelets slowly improving  - start SQH for DVT ppx    acute illness coagulopathy  --INR elevated, likely in setting of shock liver      =======================ETHICS========================  GOC: palliative assisting with goals of care   71M w/ CAD,CHF(EF 30%) s/p AICD, T2DM admitted with cardiogenic shock 2/2 volume overload w/course c/b anuric renal failure on CVVHD    Plan:  ====================== NEUROLOGY=====================  Intubated  - off propofol since 5/15 AM  - pt opening eyes to verbal stimuli; also with spontaneous movement; but not following meaningful commands  - no lyte derangements, acidemia; no infectious etiology  - monitor mentation off propofol; precedex for comfort while intubated  - will consider CT Head if no improvement in mentation    ==================== RESPIRATORY======================  hypoxic respiratory failure 2/2 cardiogenic shock  - 16/450/5/30; decrease TV to 400 based on IBW  --continue ventilator support  --defer SBT given hemodynamic instability  --wean fio2 as tolerated     ====================CARDIOVASCULAR==================  mixed shock (cardiogenic and distributive) 2/2 viral syndrome  - persistently elevated lactate (>7), not clearing despite improving liver function  - off dobutamine for inotropic support; CO 4.5/ CI 2.4  - impella 5/11; removed 5/12  - wean vasopressors for map > 65  - trend end organ perfusion, lactate, cardiac indices  - TTE on 5/11 with EF 11%    CAD  --continue ASA  --cardiac enzymes downtrended, no need to follow    HLD  --hold statin for transaminitis    ===================== RENAL =========================  ALISSA on CKD w/ unclear baseline Cr  - Renal consult called- non oliguric ALISSA  - Monitor BUN/CR and UO: Daughter addresses ok for CRRT if needed to give him a chance.   - keeping net even; with 0ml/hr removal tolerating well though without clinical improvement      ==================== GASTROINTESTINAL===================  -as per nutrition vital AF @10cc/hr advanced as tolerated to 50ml x24hrs   - Start MVI    Elevated LFT in setting of shock liver w/ coagulopathy; improving  - US of abd today  - Monitor LFT q 24hrs  - Hold hepatotoxic meds   - Continue Protonix for stress ulcer prophylaxis.     =======================    ENDOCRINE  =====================  DMT2  -  glycemic control with Admelog sliding scale   - Monitor blood glucose levels.     ========================INFECTIOUS DISEASE================  Metapneumovirus +  - S/P. Vancox2 doses. s/p 5d course of empiric cefepime (5/11 - 5/16)  - Vanco random level 21.1 5/14. MRSA neg, d/c vanco   - 5/11 BCX NGTD  - wbc within normal limits       ===================HEMATOLOGIC/ONC ===================  thrombocytopenia, chronic  - platelets slowly improving  - start SQH for DVT ppx    acute illness coagulopathy  --INR elevated, likely in setting of shock liver      =======================ETHICS========================  GOC: palliative and CCU team discussed with family re: GOC; family to follow up with us once decision made   71M w/ CAD,CHF(EF 30%) s/p AICD, T2DM admitted with cardiogenic shock 2/2 volume overload w/course c/b anuric renal failure on CVVHD    Plan:  ====================== NEUROLOGY=====================  Intubated  - off propofol since 5/15 AM  - pt opening eyes to verbal stimuli; also with spontaneous movement; but not following meaningful commands potentially due to lingering fentanyl/versed i/s/o shock liver and renal failure  - no lyte derangements, acidemia; no infectious etiology  - monitor mentation off propofol; precedex for comfort while intubated  - will consider CT Head if no improvement in mentation    ==================== RESPIRATORY======================  hypoxic respiratory failure 2/2 cardiogenic shock  - 16/450/5/30; decrease TV to 400 based on IBW  --continue ventilator support  --defer SBT given hemodynamic instability  --wean fio2 as tolerated     ====================CARDIOVASCULAR==================  mixed shock (cardiogenic and distributive) 2/2 viral syndrome  - persistently elevated lactate (>7), not clearing despite improving liver function  - off dobutamine for inotropic support; CO 4.5/ CI 2.4  - impella 5/11; removed 5/12  - wean vasopressors for map > 65  - trend end organ perfusion, lactate, cardiac indices  - TTE on 5/11 with EF 11%    CAD  --continue ASA  --cardiac enzymes downtrended, no need to follow    HLD  --hold statin for transaminitis    ===================== RENAL =========================  ALISSA on CKD w/ unclear baseline Cr  - Renal consult called- non oliguric ALISSA  - Monitor BUN/CR and UO: Daughter addresses ok for CRRT if needed to give him a chance.   - keeping net even; with 0ml/hr removal tolerating well though without clinical improvement      ==================== GASTROINTESTINAL===================  -as per nutrition vital AF @10cc/hr advanced as tolerated to 50ml x24hrs   - Start MVI    Elevated LFT in setting of shock liver w/ coagulopathy; improving  - US of abd today  - Monitor LFT q 24hrs  - Hold hepatotoxic meds   - Continue Protonix for stress ulcer prophylaxis.     =======================    ENDOCRINE  =====================  DMT2  -  glycemic control with Admelog sliding scale   - Monitor blood glucose levels.     ========================INFECTIOUS DISEASE================  Metapneumovirus +  - S/P. Vancox2 doses. s/p 5d course of empiric cefepime (5/11 - 5/16)  - Vanco random level 21.1 5/14. MRSA neg, d/c vanco   - 5/11 BCX NGTD  - wbc within normal limits       ===================HEMATOLOGIC/ONC ===================  thrombocytopenia, chronic  - platelets slowly improving  - start SQH for DVT ppx    acute illness coagulopathy  --INR elevated, likely in setting of shock liver      =======================ETHICS========================  GOC: palliative and CCU team discussed with family re: GOC; family to follow up with us once decision made   71M w/ CAD,CHF(EF 30%) s/p AICD, T2DM admitted with cardiogenic shock 2/2 volume overload w/course c/b anuric renal failure on CVVHD    Plan:  ====================== NEUROLOGY=====================  Intubated  - off propofol since 5/15 AM  - pt opening eyes to verbal stimuli; also with spontaneous movement; but not following meaningful commands potentially due to lingering fentanyl/versed i/s/o shock liver and renal failure  - no lyte derangements, acidemia; no infectious etiology  - monitor mentation off propofol; precedex for comfort while intubated  - will consider CT Head if no improvement in mentation    ==================== RESPIRATORY======================  hypoxic respiratory failure 2/2 cardiogenic shock  - 16/450/5/30; decrease TV to 400 based on IBW  --continue ventilator support  --defer SBT given hemodynamic instability  --wean fio2 as tolerated     ====================CARDIOVASCULAR==================  mixed shock (cardiogenic and distributive) 2/2 viral syndrome  - persistently elevated lactate (>7), not clearing despite improving liver function  - off dobutamine for inotropic support; CO 4.5/ CI 2.4  - impella 5/11; removed 5/12  - wean vasopressors for map > 65  - trend end organ perfusion, lactate, cardiac indices  - TTE on 5/11 with EF 11%    CAD  --continue ASA  --cardiac enzymes downtrended, no need to follow    HLD  --hold statin for transaminitis    ===================== RENAL =========================  ALISSA on CKD w/ unclear baseline Cr  - Renal consult called- non oliguric ALISSA  - Monitor BUN/CR and UO: Daughter addresses ok for CRRT if needed to give him a chance.   - keeping net even; with 0ml/hr removal tolerating well though without clinical improvement      ==================== GASTROINTESTINAL===================  -as per nutrition vital AF @10cc/hr advanced as tolerated to 50ml x24hrs   - Start MVI    Elevated LFT in setting of shock liver w/ coagulopathy; improving  - US of abd today  - Monitor LFT q 24hrs  - Hold hepatotoxic meds   - Continue Protonix for stress ulcer prophylaxis.     =======================    ENDOCRINE  =====================  DMT2  -  glycemic control with Admelog sliding scale   - Monitor blood glucose levels.     ========================INFECTIOUS DISEASE================  Metapneumovirus +  - S/P. Vancox2 doses. s/p 5d course of empiric cefepime (5/11 - 5/16)  - Vanco random level 21.1 5/14. MRSA neg, d/c vanco   - 5/11 BCX NGTD  - wbc within normal limits       ===================HEMATOLOGIC/ONC ===================  thrombocytopenia, chronic  - platelets slowly improving    acute illness coagulopathy  --INR elevated, likely in setting of shock liver      =======================ETHICS========================  GOC: palliative and CCU team discussed with family re: GOC; family to follow up with us once decision made

## 2022-05-16 NOTE — PROGRESS NOTE ADULT - THIS PATIENT HAS THE FOLLOWING CONDITION(S)/DIAGNOSES ON THIS ADMISSION:
Shock
Shock/Acute Respiratory Failure
mixed shock
Heart Failure/Renal Disease
Shock
Shock
Heart Failure
Shock
Shock/Renal Disease/Acute Respiratory Failure

## 2022-05-16 NOTE — PROGRESS NOTE ADULT - ASSESSMENT
Mr. Love is a 72 y/o M former smoker with PMH of DM2, HLD, CAD, HFrEF (EF 30%) s/p AICD, here with mixed cardiogenic/distributive shock, ALISSA and respiratory failure requiring intubation in the setting of metapneumovirus. Impella was placed, however complicated by hemolysis and rising LDH. He tolerated impella weaning and it was subsequently removed with adequate support on dobutamine. He had persisting ALISSA with poor urine output thus was initiated on CRRT. He has preserved cardiac output off dobutamine with downtrending LFTs, however with rising lactate without clear source. He's not acidotic. He continues to require vasopressor support. He's currently euvolemic with CVP 8 this morning.  Overall critically ill with guarded prognosis.     Hemodynamics:   (vaso 0.1, levo 0.03, off dobutamine), CVP 8, PA 35/15/22, mVO2 68%, CO/CI (mani) 4.5/2.4, lact 7.2 from 6.1  : (vaso 0.1, levo 0.03,  3) CVP 11, PA 43/20/27, mVO2 73%, lact 6.5

## 2022-05-16 NOTE — PROGRESS NOTE ADULT - SUBJECTIVE AND OBJECTIVE BOX
ALAN DE LA ROSA  MRN-[id number]  Patient is a 71y old  Male who presents with a chief complaint of Cardiogenic Shock (16 May 2022 17:23)    HPI:   History obtained from wife, daughter and charts.     Pt is a 71M with PMHx DMII, CHF (30%), AICD 1yr ago, HLD. He is a former smoker (quit approx 30 years ago). Pt received his second COVID 19 booster shot this week.     Per daughter, pt began having some new lower extremity edema last week and later developed a night time cough. His symptoms improved after a few days until today when his cough worsened and he became extremely tachypneic and short of breath. Daughter became very concerned and brought him to ED.     In the ED, pt was tachypneic, with systolic BP in the 70s maxed on levo. He was also on Bipap with RR 40s, TV 200s and not mentating well. Lactate was reportedly 11 and pH was 7. In the ED, he was started on milrinone and dobutamine and then shock team was activated. The patient was taken to cath lab and an Impella was placed.     Upon arrival to CICU, pt was on Dobutamine @10, Milrinone @.125, Vaso 0.08, Levo @ 1.0, intubated on PEEP 8 and fio2 100%   (11 May 2022 22:58)      Hospital Course:    24 HOUR EVENTS:    REVIEW OF SYSTEMS:  Unable to obtain due to PT sedation and intubation.       ICU Vital Signs Last 24 Hrs  T(C): 37.9 (16 May 2022 18:00), Max: 37.9 (16 May 2022 18:00)  T(F): 100.2 (16 May 2022 18:00), Max: 100.2 (16 May 2022 18:00)  HR: 95 (16 May 2022 18:45) (77 - 117)  BP: --  BP(mean): --  ABP: 96/53 (16 May 2022 18:45) (69/42 - 142/68)  ABP(mean): 64 (16 May 2022 18:45) (51 - 92)  RR: 29 (16 May 2022 18:45) (26 - 43)  SpO2: 98% (16 May 2022 18:45) (96% - 100%)    Mode: AC/ CMV (Assist Control/ Continuous Mandatory Ventilation), RR (machine): 16, TV (machine): 400, FiO2: 30, PEEP: 5, ITime: 0.72  CVP(mm Hg): 4 (22 @ 18:45) (2 - 25)  CO: 4.5 (22 @ 04:30) (4.5 - 4.5)  CI: 2.4 (22 @ 04:30) (2.4 - 2.4)  PA: 37/13 (22 @ 18:45) (32/11 - 100/33)  PA(mean): 22 (22 @ 18:45) (19 - 55)  SVR: 1120 (22 @ 04:30) (1120 - 1120)  I&O's Summary    15 May 2022 07:01  -  16 May 2022 07:00  --------------------------------------------------------  IN: 2815.7 mL / OUT: 3072 mL / NET: -256.3 mL    16 May 2022 07:01  -  16 May 2022 19:46  --------------------------------------------------------  IN: 1506.2 mL / OUT: 1317 mL / NET: 189.2 mL      CAPILLARY BLOOD GLUCOSE      POCT Blood Glucose.: 148 mg/dL (16 May 2022 17:27)      PHYSICAL EXAM:  Constitutional: Patient laying in bed, NAD  Head: Atraumatic, normocephalic  Eyes: No scleral icterus. PERRLA.  ENMT: Moist mucous membrane. ETT midline  Neck: Supple, No JVD  Respiratory: CTA B/L. No wheezes, rales or rhonchi   Cardiovascular: S1/S2. No murmurs, rubs, or gallops.  Gastrointestinal: Nondistended, BSx4, soft, nontender.  Extremities: Moves all extremities. Warm, no edema, nontender  Vascular: 2+ DP/PT pulses B/L   Neurological: Sedates, does not follow commands, withdraws from noxious stimuli    Skin: No rashes on exposed skin     ============================I/O===========================   I&O's Detail    15 May 2022 07:  -  16 May 2022 07:00  --------------------------------------------------------  IN:    dextrose 10%: 570 mL    DOBUTamine: 17.6 mL    DOBUTamine: 35.2 mL    Enteral Tube Flush: 220 mL    IV PiggyBack: 100 mL    Norepinephrine: 1329.7 mL    Propofol: 9.2 mL    Vasopressin: 144 mL    Vital1.5: 390 mL  Total IN: 2815.7 mL    OUT:    Indwelling Catheter - Urethral (mL): 35 mL    Nasogastric/Oral tube (mL): 0 mL    Other (mL): 2687 mL    Rectal Tube (mL): 350 mL  Total OUT: 3072 mL    Total NET: -256.3 mL      16 May 2022 07:01  -  16 May 2022 19:46  --------------------------------------------------------  IN:    Dexmedetomidine: 29.6 mL    DOBUTamine: 8.8 mL    Enteral Tube Flush: 180 mL    Norepinephrine: 465.6 mL    Norepinephrine: 180.2 mL    Vasopressin: 42 mL    Vasopressin: 30 mL    Vital1.5: 570 mL  Total IN: 1506.2 mL    OUT:    Indwelling Catheter - Urethral (mL): 15 mL    Nasogastric/Oral tube (mL): 0 mL    Other (mL): 1102 mL    Propofol: 0 mL    Rectal Tube (mL): 200 mL  Total OUT: 1317 mL    Total NET: 189.2 mL        ============================ LABS =========================                        12.8   10.81 )-----------( 60       ( 16 May 2022 15:55 )             40.7         136  |  103  |  27<H>  ----------------------------<  177<H>  4.2   |  14<L>  |  1.88<H>    Ca    8.4      16 May 2022 15:55  Phos  3.2       Mg     2.4         TPro  4.9<L>  /  Alb  3.0<L>  /  TBili  13.0<H>  /  DBili  x   /  AST  940<H>  /  ALT  2480<H>  /  AlkPhos  223<H>      Troponin T, High Sensitivity Result: 446 ng/L (22 @ 04:24)    CKMB Units: 5.9 ng/mL (22 @ 04:24)    Creatine Kinase, Serum: 306 U/L (22 @ 04:24)    CPK Mass Assay %: 1.9 % (22 @ 04:24)        LIVER FUNCTIONS - ( 16 May 2022 15:55 )  Alb: 3.0 g/dL / Pro: 4.9 g/dL / ALK PHOS: 223 U/L / ALT: 2480 U/L / AST: 940 U/L / GGT: x           PT/INR - ( 16 May 2022 04:24 )   PT: 31.7 sec;   INR: 2.71 ratio         PTT - ( 16 May 2022 04:24 )  PTT:62.8 sec  ABG - ( 16 May 2022 15:51 )  pH, Arterial: 7.39  pH, Blood: x     /  pCO2: 25    /  pO2: 137   / HCO3: 15    / Base Excess: -8.1  /  SaO2: 99.0              Lactate, Blood: 6.9 mmol/L (22 @ 15:55)  Blood Gas Arterial, Lactate: 6.4 mmol/L (22 @ 15:51)  Lactate, Blood: 7.7 mmol/L (22 @ 08:49)  Blood Gas Arterial, Lactate: 7.2 mmol/L (22 @ 04:15)  Blood Gas Venous - Lactate: 6.4 mmol/L (05-15-22 @ 16:45)  Blood Gas Arterial, Lactate: 6.1 mmol/L (05-15-22 @ 06:44)  Lactate, Blood: 6.7 mmol/L (05-15-22 @ 00:59)  Blood Gas Arterial, Lactate: 6.4 mmol/L (05-15-22 @ 00:55)  Lactate, Blood: 7.0 mmol/L (22 @ 17:34)  Blood Gas Venous - Lactate: 8.1 mmol/L (22 @ 14:41)  Lactate, Blood: 7.1 mmol/L (22 @ 09:39)  Blood Gas Arterial, Lactate: 6.4 mmol/L (22 @ 06:08)  Blood Gas Arterial, Lactate: 6.5 mmol/L (22 @ 00:47)      ======================Micro/Rad/Cardio=================  Telemtry: Reviewed   EKG: Reviewed  CXR: Reviewed  Culture: Reviewed   Echo:   ======================================================  PAST MEDICAL & SURGICAL HISTORY:  Hypertension      Diabetes  A1C 6.8  on admission      Former smoker      HLD (hyperlipidemia)      CAD (coronary artery disease)  reports angiogram - 1 year ago - pt reports non obstructive  at [facility]      CHF (congestive heart failure)  (EF 30%)      H/O fracture of wrist  and left ankle (ORIF ankle) following fall        ====================ASSESSMENT ==============  70 y/o M w/ CAD,CHF(EF 30%) s/p AICD, dm2 admitted w/ SOB and malaise hypotensive w/ elevated LA in setting of cardiogenic and distributive shock     Plan:  ====================== NEUROLOGY=====================  Sedation   - Continue with Precedex   - Hydromorphone PRN tachypnea   -Still doesn't follow command, withdraws to pain     dexMEDEtomidine Infusion 0.2 MICROgram(s)/kG/Hr (3.68 mL/Hr) IV Continuous <Continuous>  HYDROmorphone  Injectable 0.5 milliGRAM(s) IV Push every 4 hours PRN tachypnea    ==================== RESPIRATORY======================  acute hypoxic respiratory failure   -continue ventilator support  -defer SBT given hemodynamic instability   -wean fio2 as tolerated   -vent bundle    Mechanical Ventilation:  Mode: AC/ CMV (Assist Control/ Continuous Mandatory Ventilation)  RR (machine): 16  TV (machine): 400  FiO2: 30  PEEP: 5  ITime: 0.72  MAP: 11  PIP: 28      ====================CARDIOVASCULAR==================  mixed shock  - d/c overnight, repeated MVO2 65.5% CO 4.4 CI 2.4  -wean vasopressor and levo for map > 65  -trend end organ perfusion, lactate, cardiac indices   -TTE on  with EF 5%, consider repeating?    CAD  -continue ASA  -cardiac enzymes downtrended, no need to follow    HLD  -hold statin for transaminitis    DOBUTamine Infusion 2 MICROgram(s)/kG/Min (4.41 mL/Hr) IV Continuous <Continuous>  norepinephrine Infusion 0.5 MICROgram(s)/kG/Min (34.5 mL/Hr) IV Continuous <Continuous>  vasopressin Infusion 0.1 Unit(s)/Min (6 mL/Hr) IV Continuous <Continuous>    ===================HEMATOLOGIC/ONC ===================  thrombocytopenia   -platelets slowly improving  -will give 1 platelet for line placement  -heme/onc consult, smear unremarkable, felt to be 2/2 hemolysis   - c/w hep    acute illness coagulopathy  -INR elevated, likely in setting of shock liver  -will transfuse FFP x 1 for line placement    heparin  Infusion Syringe 300 Unit(s)/Hr (0.6 mL/Hr) CRRT <Continuous>    ===================== RENAL =========================  ALISSA on CKD w/ unclear baseline Cr  I/O IN: 3243.4 mL / OUT: 1675 mL / NET: 1568.4 mL  - Renal consult called- non oliguric ALISSA  - Monitor BUN/CR and UO: Daughter addresses ok for CRRT if needed to give him a chance.   -CRRT started, with 0ml/hr removal tolerating well     ==================== GASTROINTESTINAL===================  -as per nutrition vital AF @10cc/hr advanced as tolerated to 50ml x24hrs   - Start MVI   - Reglan for gut motility     Elevated LFT in setting of shock liver w/ coagulopathy  - US of abd today  - Monitor LFT q 24hrs  - Hold hepatotoxic meds   - Continue Protonix for stress ulcer prophylaxis.     metoclopramide 10 milliGRAM(s) Oral three times a day  multivitamin/minerals/iron Oral Solution (CENTRUM) 15 milliLiter(s) Oral daily  pantoprazole  Injectable 40 milliGRAM(s) IV Push two times a day    =======================    ENDOCRINE  =====================  DMT2  -  glycemic control with Admelog sliding scale and Glucagon PRN.   -Monitor blood glucose levels.     dextrose 50% Injectable 12.5 Gram(s) IV Push once  dextrose 50% Injectable 25 Gram(s) IV Push once  dextrose 50% Injectable 25 Gram(s) IV Push once  dextrose Oral Gel 15 Gram(s) Oral once  glucagon  Injectable 1 milliGRAM(s) IntraMuscular once  insulin lispro (ADMELOG) corrective regimen sliding scale   SubCutaneous every 4 hours    ========================INFECTIOUS DISEASE================  Metapneumovirus +  - S/P. Vancox2 doses. C/w cefepime for empiric coverage.   -Vanco random level 21.1 . MRSA neg, d/c vanco   -  BCX NGTD  - wbc within normal limits       Patient requires continuous monitoring with bedside rhythm monitoring, pulse ox monitoring, and intermittent blood gas analysis. Care plan discussed with ICU care team. Patient remained critical and at risk for life threatening decompensation.  Patient seen, examined and plan discussed with CCU team during rounds.     I have personally provided ____ minutes of critical care time excluding time spent on separate procedures, in addition to initial critical care time provided by the CICU Attending, Dr. Vallecillo.     By signing my name below, I, Mady Van, attest that this documentation has been prepared under the direction and in the presence of Jill Quiles NP.  Electronically signed: Nadia Acevedo, 22 @ 19:46    I, Jill Quiles, personally performed the services described in this documentation. all medical record entries made by the eduaribselma were at my direction and in my presence. I have reviewed the chart and agree that the record reflects my personal performance and is accurate and complete  Electronically signed: Jill Quiles NP.       ALAN DE LA ROSA  MRN-[id number]  Patient is a 71y old  Male who presents with a chief complaint of Cardiogenic Shock (16 May 2022 17:23)    HPI:   History obtained from wife, daughter and charts.     Pt is a 71M with PMHx DMII, CHF (30%), AICD 1yr ago, HLD. He is a former smoker (quit approx 30 years ago). Pt received his second COVID 19 booster shot this week.     Per daughter, pt began having some new lower extremity edema last week and later developed a night time cough. His symptoms improved after a few days until today when his cough worsened and he became extremely tachypneic and short of breath. Daughter became very concerned and brought him to ED.     In the ED, pt was tachypneic, with systolic BP in the 70s maxed on levo. He was also on Bipap with RR 40s, TV 200s and not mentating well. Lactate was reportedly 11 and pH was 7. In the ED, he was started on milrinone and dobutamine and then shock team was activated. The patient was taken to cath lab and an Impella was placed.     Upon arrival to CICU, pt was on Dobutamine @10, Milrinone @.125, Vaso 0.08, Levo @ 1.0, intubated on PEEP 8 and fio2 100%   (11 May 2022 22:58)      Hospital Course:    24 HOUR EVENTS:    REVIEW OF SYSTEMS:  Unable to obtain due to PT sedation and intubation.       ICU Vital Signs Last 24 Hrs  T(C): 37.9 (16 May 2022 18:00), Max: 37.9 (16 May 2022 18:00)  T(F): 100.2 (16 May 2022 18:00), Max: 100.2 (16 May 2022 18:00)  HR: 95 (16 May 2022 18:45) (77 - 117)  BP: --  BP(mean): --  ABP: 96/53 (16 May 2022 18:45) (69/42 - 142/68)  ABP(mean): 64 (16 May 2022 18:45) (51 - 92)  RR: 29 (16 May 2022 18:45) (26 - 43)  SpO2: 98% (16 May 2022 18:45) (96% - 100%)    Mode: AC/ CMV (Assist Control/ Continuous Mandatory Ventilation), RR (machine): 16, TV (machine): 400, FiO2: 30, PEEP: 5, ITime: 0.72  CVP(mm Hg): 4 (22 @ 18:45) (2 - 25)  CO: 4.5 (22 @ 04:30) (4.5 - 4.5)  CI: 2.4 (22 @ 04:30) (2.4 - 2.4)  PA: 37/13 (22 @ 18:45) (32/11 - 100/33)  PA(mean): 22 (22 @ 18:45) (19 - 55)  SVR: 1120 (22 @ 04:30) (1120 - 1120)  I&O's Summary    15 May 2022 07:01  -  16 May 2022 07:00  --------------------------------------------------------  IN: 2815.7 mL / OUT: 3072 mL / NET: -256.3 mL    16 May 2022 07:01  -  16 May 2022 19:46  --------------------------------------------------------  IN: 1506.2 mL / OUT: 1317 mL / NET: 189.2 mL      CAPILLARY BLOOD GLUCOSE      POCT Blood Glucose.: 148 mg/dL (16 May 2022 17:27)      PHYSICAL EXAM:  Constitutional: Patient laying in bed, NAD  Head: Atraumatic, normocephalic  Eyes: No scleral icterus. PERRLA.  ENMT: Moist mucous membrane. ETT midline  Neck: Supple, No JVD  Respiratory: CTA B/L. No wheezes, rales or rhonchi   Cardiovascular: S1/S2. No murmurs, rubs, or gallops.  Gastrointestinal: Nondistended, BSx4, soft, nontender.  Extremities: Moves all extremities. Warm, no edema, nontender  Vascular: 2+ DP/PT pulses B/L   Neurological: Sedates, does not follow commands, withdraws from noxious stimuli    Skin: No rashes on exposed skin     ============================I/O===========================   I&O's Detail    15 May 2022 07:  -  16 May 2022 07:00  --------------------------------------------------------  IN:    dextrose 10%: 570 mL    DOBUTamine: 17.6 mL    DOBUTamine: 35.2 mL    Enteral Tube Flush: 220 mL    IV PiggyBack: 100 mL    Norepinephrine: 1329.7 mL    Propofol: 9.2 mL    Vasopressin: 144 mL    Vital1.5: 390 mL  Total IN: 2815.7 mL    OUT:    Indwelling Catheter - Urethral (mL): 35 mL    Nasogastric/Oral tube (mL): 0 mL    Other (mL): 2687 mL    Rectal Tube (mL): 350 mL  Total OUT: 3072 mL    Total NET: -256.3 mL      16 May 2022 07:01  -  16 May 2022 19:46  --------------------------------------------------------  IN:    Dexmedetomidine: 29.6 mL    DOBUTamine: 8.8 mL    Enteral Tube Flush: 180 mL    Norepinephrine: 465.6 mL    Norepinephrine: 180.2 mL    Vasopressin: 42 mL    Vasopressin: 30 mL    Vital1.5: 570 mL  Total IN: 1506.2 mL    OUT:    Indwelling Catheter - Urethral (mL): 15 mL    Nasogastric/Oral tube (mL): 0 mL    Other (mL): 1102 mL    Propofol: 0 mL    Rectal Tube (mL): 200 mL  Total OUT: 1317 mL    Total NET: 189.2 mL        ============================ LABS =========================                        12.8   10.81 )-----------( 60       ( 16 May 2022 15:55 )             40.7         136  |  103  |  27<H>  ----------------------------<  177<H>  4.2   |  14<L>  |  1.88<H>    Ca    8.4      16 May 2022 15:55  Phos  3.2       Mg     2.4         TPro  4.9<L>  /  Alb  3.0<L>  /  TBili  13.0<H>  /  DBili  x   /  AST  940<H>  /  ALT  2480<H>  /  AlkPhos  223<H>      Troponin T, High Sensitivity Result: 446 ng/L (22 @ 04:24)    CKMB Units: 5.9 ng/mL (22 @ 04:24)    Creatine Kinase, Serum: 306 U/L (22 @ 04:24)    CPK Mass Assay %: 1.9 % (22 @ 04:24)        LIVER FUNCTIONS - ( 16 May 2022 15:55 )  Alb: 3.0 g/dL / Pro: 4.9 g/dL / ALK PHOS: 223 U/L / ALT: 2480 U/L / AST: 940 U/L / GGT: x           PT/INR - ( 16 May 2022 04:24 )   PT: 31.7 sec;   INR: 2.71 ratio         PTT - ( 16 May 2022 04:24 )  PTT:62.8 sec  ABG - ( 16 May 2022 15:51 )  pH, Arterial: 7.39  pH, Blood: x     /  pCO2: 25    /  pO2: 137   / HCO3: 15    / Base Excess: -8.1  /  SaO2: 99.0              Lactate, Blood: 6.9 mmol/L (22 @ 15:55)  Blood Gas Arterial, Lactate: 6.4 mmol/L (22 @ 15:51)  Lactate, Blood: 7.7 mmol/L (22 @ 08:49)  Blood Gas Arterial, Lactate: 7.2 mmol/L (22 @ 04:15)  Blood Gas Venous - Lactate: 6.4 mmol/L (05-15-22 @ 16:45)  Blood Gas Arterial, Lactate: 6.1 mmol/L (05-15-22 @ 06:44)  Lactate, Blood: 6.7 mmol/L (05-15-22 @ 00:59)  Blood Gas Arterial, Lactate: 6.4 mmol/L (05-15-22 @ 00:55)  Lactate, Blood: 7.0 mmol/L (22 @ 17:34)  Blood Gas Venous - Lactate: 8.1 mmol/L (22 @ 14:41)  Lactate, Blood: 7.1 mmol/L (22 @ 09:39)  Blood Gas Arterial, Lactate: 6.4 mmol/L (22 @ 06:08)  Blood Gas Arterial, Lactate: 6.5 mmol/L (22 @ 00:47)      ======================Micro/Rad/Cardio=================  Telemtry: Reviewed   EKG: Reviewed  CXR: Reviewed  Culture: Reviewed   Echo:   ======================================================  PAST MEDICAL & SURGICAL HISTORY:  Hypertension      Diabetes  A1C 6.8  on admission      Former smoker      HLD (hyperlipidemia)      CAD (coronary artery disease)  reports angiogram - 1 year ago - pt reports non obstructive  at [facility]      CHF (congestive heart failure)  (EF 30%)      H/O fracture of wrist  and left ankle (ORIF ankle) following fall        ====================ASSESSMENT ==============  72 y/o M w/ CAD,CHF(EF 30%) s/p AICD, dm2 admitted w/ SOB and malaise hypotensive w/ elevated LA in setting of cardiogenic and distributive shock     Plan:  ====================== NEUROLOGY=====================  Sedation   - Continue with Precedex   - Hydromorphone PRN tachypnea   -Still doesn't follow command, withdraws to pain     dexMEDEtomidine Infusion 0.2 MICROgram(s)/kG/Hr (3.68 mL/Hr) IV Continuous <Continuous>  HYDROmorphone  Injectable 0.5 milliGRAM(s) IV Push every 4 hours PRN tachypnea    ==================== RESPIRATORY======================  acute hypoxic respiratory failure   -continue ventilator support  -defer SBT given hemodynamic instability   -wean fio2 as tolerated   -vent bundle    Mechanical Ventilation:  Mode: AC/ CMV (Assist Control/ Continuous Mandatory Ventilation)  RR (machine): 16  TV (machine): 400  FiO2: 30  PEEP: 5  ITime: 0.72  MAP: 11  PIP: 28      ====================CARDIOVASCULAR==================  mixed shock  - d/c overnight, repeated MVO2 65.5% CO 4.4 CI 2.4  -wean vasopressor and levo for map > 65  -trend end organ perfusion, lactate, cardiac indices   -TTE on  with EF 5%, consider repeating?    CAD  -continue ASA  -cardiac enzymes downtrended, no need to follow    HLD  -hold statin for transaminitis    DOBUTamine Infusion 2 MICROgram(s)/kG/Min (4.41 mL/Hr) IV Continuous <Continuous>  norepinephrine Infusion 0.5 MICROgram(s)/kG/Min (34.5 mL/Hr) IV Continuous <Continuous>  vasopressin Infusion 0.1 Unit(s)/Min (6 mL/Hr) IV Continuous <Continuous>    ===================HEMATOLOGIC/ONC ===================  thrombocytopenia   -platelets slowly improving  -will give 1 platelet for line placement  -heme/onc consult, smear unremarkable, felt to be 2/2 hemolysis   - c/w hep    acute illness coagulopathy  -INR elevated, likely in setting of shock liver  -will transfuse FFP x 1 for line placement    heparin  Infusion Syringe 300 Unit(s)/Hr (0.6 mL/Hr) CRRT <Continuous>    ===================== RENAL =========================  ALISSA on CKD w/ unclear baseline Cr  I/O IN: 3243.4 mL / OUT: 1675 mL / NET: 1568.4 mL  - Renal consult called- non oliguric ALISSA  - Monitor BUN/CR and UO: Daughter addresses ok for CRRT if needed to give him a chance.   -CRRT started, with 0ml/hr removal tolerating well     ==================== GASTROINTESTINAL===================  -as per nutrition vital AF @10cc/hr advanced as tolerated to 50ml x24hrs   - Start MVI   - Reglan for gut motility     Elevated LFT in setting of shock liver w/ coagulopathy  - US of abd today  - Monitor LFT q 24hrs  - Hold hepatotoxic meds   - Continue Protonix for stress ulcer prophylaxis.     metoclopramide 10 milliGRAM(s) Oral three times a day  multivitamin/minerals/iron Oral Solution (CENTRUM) 15 milliLiter(s) Oral daily  pantoprazole  Injectable 40 milliGRAM(s) IV Push two times a day    =======================    ENDOCRINE  =====================  DMT2  -  glycemic control with Admelog sliding scale and Glucagon PRN.   -Monitor blood glucose levels.     dextrose 50% Injectable 12.5 Gram(s) IV Push once  dextrose 50% Injectable 25 Gram(s) IV Push once  dextrose 50% Injectable 25 Gram(s) IV Push once  dextrose Oral Gel 15 Gram(s) Oral once  glucagon  Injectable 1 milliGRAM(s) IntraMuscular once  insulin lispro (ADMELOG) corrective regimen sliding scale   SubCutaneous every 4 hours    ========================INFECTIOUS DISEASE================  Metapneumovirus +  - S/P. Vancox2 doses. C/w cefepime for empiric coverage.   -Vanco random level 21.1 . MRSA neg, d/c vanco   -  BCX NGTD  - wbc within normal limits       Patient requires continuous monitoring with bedside rhythm monitoring, pulse ox monitoring, and intermittent blood gas analysis. Care plan discussed with ICU care team. Patient remained critical and at risk for life threatening decompensation.  Patient seen, examined and plan discussed with CCU team during rounds.     I have personally provided 45 minutes of critical care time excluding time spent on separate procedures, in addition to initial critical care time provided by the CICU Attending, Dr. Vallecillo.     By signing my name below, I, Mady Van, attest that this documentation has been prepared under the direction and in the presence of Jill Quiles NP.  Electronically signed: Makeda Acevedo, 22 @ 19:46    I, Jill Quiles, personally performed the services described in this documentation. all medical record entries made by the makeda were at my direction and in my presence. I have reviewed the chart and agree that the record reflects my personal performance and is accurate and complete  Electronically signed: Jill Quiles NP.       ALAN DE LA ROSA  MRN-[id number]  Patient is a 71y old  Male who presents with a chief complaint of Cardiogenic Shock (16 May 2022 17:23)    HPI: 71M Hx DMII, CHF (30%), AICD 1yr ago, HLD. He is a former smoker (quit approx 30 years ago). Pt received his second COVID 19 booster shot this week.  Per daughter, pt began having some new lower extremity edema last week and later developed a night time cough. His symptoms improved after a few days until today when his cough worsened and he became extremely tachypneic and short of breath. Daughter became very concerned and brought him to ED.  In the ED, pt was tachypneic, with systolic BP in the 70s maxed on levo. He was also on Bipap with RR 40s, TV 200s and not mentating well. Lactate was reportedly 11 and pH was 7. In the ED, he was started on milrinone and dobutamine and then shock team was activated. The patient was taken to cath lab and an Impella was placed.  Upon arrival to CICU, pt was on Dobutamine @10, Milrinone @.125, Vaso 0.08, Levo @ 1.0, intubated on PEEP 8 and fio2 100% (11 May 2022 22:58)    REVIEW OF SYSTEMS:  Unable to obtain due to PT sedation and intubation.     ICU Vital Signs Last 24 Hrs  T(C): 37.9 (16 May 2022 18:00), Max: 37.9 (16 May 2022 18:00)  T(F): 100.2 (16 May 2022 18:00), Max: 100.2 (16 May 2022 18:00)  HR: 95 (16 May 2022 18:45) (77 - 117)  ABP: 96/53 (16 May 2022 18:45) (69/42 - 142/68)  ABP(mean): 64 (16 May 2022 18:45) (51 - 92)  RR: 29 (16 May 2022 18:45) (26 - 43)  SpO2: 98% (16 May 2022 18:45) (96% - 100%)    Mode: AC/ CMV (Assist Control/ Continuous Mandatory Ventilation)  RR (machine): 16, TV (machine): 400, FiO2: 30, PEEP: 5, ITime: 0.72    CVP(mm Hg): 4 (22 @ 18:45) (2 - 25)  CO: 4.5 (22 @ 04:30) (4.5 - 4.5)  CI: 2.4 (22 @ 04:30) (2.4 - 2.4)  PA: 37/13 (22 @ 18:45) (32/11 - 100/33)  PA(mean): 22 (22 @ 18:45) (19 - 55)  SVR: 1120 (22 @ 04:30) (1120 - 1120)    I&O's Summary    15 May 2022 07:  -  16 May 2022 07:00  --------------------------------------------------------  IN: 2815.7 mL / OUT: 3072 mL / NET: -256.3 mL    16 May 2022 07:  -  16 May 2022 19:46  --------------------------------------------------------  IN: 1506.2 mL / OUT: 1317 mL / NET: 189.2 mL      CAPILLARY BLOOD GLUCOSE  POCT Blood Glucose.: 148 mg/dL (16 May 2022 17:27)  ============================I/O===========================   I&O's Detail    15 May 2022 07:  -  16 May 2022 07:00  --------------------------------------------------------  IN:    dextrose 10%: 570 mL    DOBUTamine: 17.6 mL    DOBUTamine: 35.2 mL    Enteral Tube Flush: 220 mL    IV PiggyBack: 100 mL    Norepinephrine: 1329.7 mL    Propofol: 9.2 mL    Vasopressin: 144 mL    Vital1.5: 390 mL  Total IN: 2815.7 mL    OUT:    Indwelling Catheter - Urethral (mL): 35 mL    Nasogastric/Oral tube (mL): 0 mL    Other (mL): 2687 mL    Rectal Tube (mL): 350 mL  Total OUT: 3072 mL    Total NET: -256.3 mL      16 May 2022 07:01  -  16 May 2022 19:46  --------------------------------------------------------  IN:    Dexmedetomidine: 29.6 mL    DOBUTamine: 8.8 mL    Enteral Tube Flush: 180 mL    Norepinephrine: 465.6 mL    Norepinephrine: 180.2 mL    Vasopressin: 42 mL    Vasopressin: 30 mL    Vital1.5: 570 mL  Total IN: 1506.2 mL    OUT:    Indwelling Catheter - Urethral (mL): 15 mL    Nasogastric/Oral tube (mL): 0 mL    Other (mL): 1102 mL    Propofol: 0 mL    Rectal Tube (mL): 200 mL  Total OUT: 1317 mL    Total NET: 189.2 mL  ============================ LABS =========================                        12.8   10.81 )-----------( 60       ( 16 May 2022 15:55 )             40.7     05-    136  |  103  |  27<H>  ----------------------------<  177<H>  4.2   |  14<L>  |  1.88<H>    Ca    8.4      16 May 2022 15:55  Phos  3.2     05-16  Mg     2.4     -    TPro  4.9<L>  /  Alb  3.0<L>  /  TBili  13.0<H>  /  DBili  x   /  AST  940<H>  /  ALT  2480<H>  /  AlkPhos  223<H>      Troponin T, High Sensitivity Result: 446 ng/L (22 @ 04:24)    CKMB Units: 5.9 ng/mL (22 @ 04:24)    Creatine Kinase, Serum: 306 U/L (22 @ 04:24)    CPK Mass Assay %: 1.9 % (22 @ 04:24)    LIVER FUNCTIONS - ( 16 May 2022 15:55 )  Alb: 3.0 g/dL / Pro: 4.9 g/dL / ALK PHOS: 223 U/L / ALT: 2480 U/L / AST: 940 U/L / GGT: x           PT/INR - ( 16 May 2022 04:24 )   PT: 31.7 sec;   INR: 2.71 ratio    PTT - ( 16 May 2022 04:24 )  PTT:62.8 sec    ABG - ( 16 May 2022 15:51 )  pH, Arterial: 7.39  pH, Blood: x     /  pCO2: 25    /  pO2: 137   / HCO3: 15    / Base Excess: -8.1  /  SaO2: 99.0      Lactate, Blood: 6.9 mmol/L (22 @ 15:55)  Blood Gas Arterial, Lactate: 6.4 mmol/L (22 @ 15:51)  Lactate, Blood: 7.7 mmol/L (22 @ 08:49)  Blood Gas Arterial, Lactate: 7.2 mmol/L (22 @ 04:15)  Blood Gas Venous - Lactate: 6.4 mmol/L (05-15-22 @ 16:45)  Blood Gas Arterial, Lactate: 6.1 mmol/L (05-15-22 @ 06:44)  Lactate, Blood: 6.7 mmol/L (05-15-22 @ 00:59)  Blood Gas Arterial, Lactate: 6.4 mmol/L (05-15-22 @ 00:55)  Lactate, Blood: 7.0 mmol/L (22 @ 17:34)  Blood Gas Venous - Lactate: 8.1 mmol/L (22 @ 14:41)  Lactate, Blood: 7.1 mmol/L (22 @ 09:39)  Blood Gas Arterial, Lactate: 6.4 mmol/L (22 @ 06:08)  Blood Gas Arterial, Lactate: 6.5 mmol/L (22 @ 00:47)  ======================Micro/Rad/Cardio=================  Telemtry: Reviewed   EKG: Reviewed  CXR: Reviewed  Culture: Reviewed   ======================================================  PAST MEDICAL & SURGICAL HISTORY:  Hypertension  Diabetes  A1C 6.8  on admission  Former smoker  HLD (hyperlipidemia)  CAD (coronary artery disease)  reports angiogram - 1 year ago - pt reports non obstructive  at [facility]  CHF (congestive heart failure) (EF 30%)  H/O fracture of wrist  and left ankle (ORIF ankle) following fall    ====================ASSESSMENT ==============  72 y/o M w/ CAD,CHF(EF 30%) s/p AICD, dm2 admitted w/ SOB and malaise hypotensive w/ elevated LA in setting of cardiogenic and distributive shock     Plan:  ====================== NEUROLOGY=====================  Sedation   - Continue with Precedex   - Hydromorphone PRN tachypnea   -Still doesn't follow command, withdraws to pain     dexMEDEtomidine Infusion 0.2 MICROgram(s)/kG/Hr (3.68 mL/Hr) IV Continuous <Continuous>  HYDROmorphone  Injectable 0.5 milliGRAM(s) IV Push every 4 hours PRN tachypnea    ==================== RESPIRATORY======================  acute hypoxic respiratory failure   -continue ventilator support  -defer SBT given hemodynamic instability   -wean fio2 as tolerated   -vent bundle    Mechanical Ventilation:  Mode: AC/ CMV (Assist Control/ Continuous Mandatory Ventilation)  RR (machine): 16  TV (machine): 400  FiO2: 30  PEEP: 5  ITime: 0.72  MAP: 11  PIP: 28  ====================CARDIOVASCULAR==================  mixed shock  - d/c overnight, repeated MVO2 65.5% CO 4.4 CI 2.4  -wean vasopressor and levo for map > 65  -trend end organ perfusion, lactate, cardiac indices   -TTE on  with EF 5%, consider repeating?    CAD  -continue ASA  -cardiac enzymes downtrended, no need to follow    HLD  -hold statin for transaminitis    DOBUTamine Infusion 2 MICROgram(s)/kG/Min (4.41 mL/Hr) IV Continuous <Continuous>  norepinephrine Infusion 0.5 MICROgram(s)/kG/Min (34.5 mL/Hr) IV Continuous <Continuous>  vasopressin Infusion 0.1 Unit(s)/Min (6 mL/Hr) IV Continuous <Continuous>    ===================HEMATOLOGIC/ONC ===================  thrombocytopenia   -platelets slowly improving  -will give 1 platelet for line placement  -heme/onc consult, smear unremarkable, felt to be 2/2 hemolysis   - c/w hep    acute illness coagulopathy  -INR elevated, likely in setting of shock liver  -will transfuse FFP x 1 for line placement    heparin  Infusion Syringe 300 Unit(s)/Hr (0.6 mL/Hr) CRRT <Continuous>    ===================== RENAL =========================  ALISSA on CKD w/ unclear baseline Cr  I/O IN: 3243.4 mL / OUT: 1675 mL / NET: 1568.4 mL  - Renal consult called- non oliguric ALISSA  - Monitor BUN/CR and UO: Daughter addresses ok for CRRT if needed to give him a chance.   -CRRT started, with 0ml/hr removal tolerating well     ==================== GASTROINTESTINAL===================  -as per nutrition vital AF @10cc/hr advanced as tolerated to 50ml x24hrs   - Start MVI   - Reglan for gut motility     Elevated LFT in setting of shock liver w/ coagulopathy  - US of abd today  - Monitor LFT q 24hrs  - Hold hepatotoxic meds   - Continue Protonix for stress ulcer prophylaxis.     metoclopramide 10 milliGRAM(s) Oral three times a day  multivitamin/minerals/iron Oral Solution (CENTRUM) 15 milliLiter(s) Oral daily  pantoprazole  Injectable 40 milliGRAM(s) IV Push two times a day    =======================    ENDOCRINE  =====================  DMT2  -  glycemic control with Admelog sliding scale and Glucagon PRN.   -Monitor blood glucose levels.     dextrose 50% Injectable 12.5 Gram(s) IV Push once  dextrose 50% Injectable 25 Gram(s) IV Push once  dextrose 50% Injectable 25 Gram(s) IV Push once  dextrose Oral Gel 15 Gram(s) Oral once  glucagon  Injectable 1 milliGRAM(s) IntraMuscular once  insulin lispro (ADMELOG) corrective regimen sliding scale   SubCutaneous every 4 hours    ========================INFECTIOUS DISEASE================  Metapneumovirus +  - S/P. Vancox2 doses. C/w cefepime for empiric coverage.   -Vanco random level 21.1 . MRSA neg, d/c vanco   -  BCX NGTD  - wbc within normal limits       Patient requires continuous monitoring with bedside rhythm monitoring, pulse ox monitoring, and intermittent blood gas analysis. Care plan discussed with ICU care team. Patient remained critical and at risk for life threatening decompensation.  Patient seen, examined and plan discussed with CCU team during rounds.     I have personally provided 45 minutes of critical care time excluding time spent on separate procedures, in addition to initial critical care time provided by the CICU Attending, Dr. Vallecillo.     By signing my name below, I, Mady Van, attest that this documentation has been prepared under the direction and in the presence of Jill Quiles NP.  Electronically signed: Nadia Acevedo, 22 @ 19:46    I, Jill Quiles, personally performed the services described in this documentation. all medical record entries made by the scribe were at my direction and in my presence. I have reviewed the chart and agree that the record reflects my personal performance and is accurate and complete  Electronically signed: Jill Quiles NP.

## 2022-05-16 NOTE — CHART NOTE - NSCHARTNOTEFT_GEN_A_CORE
Mv02 56.8 on  2mcg, CO/CI 3.38/1.85 SVR 1585 CVP 4 on CRRT even Lactate 6.9 (7.7), remains consistent on Levo 0.5 Vaso 0.1 - discussed case with Dr. Hilton - increase  to 5mcg trend hemos/end organ markers.  Patient in multiorgan failure, not a candidate for tMCS escalation, poor prognosis with on-going GOC.

## 2022-05-16 NOTE — PROGRESS NOTE ADULT - PROBLEM SELECTOR PLAN 5
see above GOC note  DNR   Family wishes to continue all interventions for now. Plan for f/u GOC discussion on Wednesday 5/18

## 2022-05-16 NOTE — PROGRESS NOTE ADULT - PROBLEM SELECTOR PLAN 6
Will continue to follow for goals of care.    For acute issues or uncontrolled symptoms please page palliative team.    Belkis Doherty MD  Geriatrics and Palliative Medicine Attending  [facility] pager: [phone]     The Geriatrics and Palliative Medicine consult service has 24/7 coverage for medical recommendations, including symptom management needs.

## 2022-05-16 NOTE — PROGRESS NOTE ADULT - ASSESSMENT
71M hx CAD, CHF (EF30% sp ICD, DMII, HLD, p/w SOB and malaise found to be in cardiogenic shock requiring intubation and impella placement. Medicine consult called for co medical mgmt.    # Cardiogenic Shock  # acute hypoxic resp failure  # HFrEF  # CAD  # DM2  # Metapneumovirus   # ALISSA  # thrombocytopenia  # atrial tach  # shock liver    on vaso and levophed, wean vasopressors as tolerated  on CRRT per renal  mech vent per ICU  BC NTD  LFTs creat downtrending but still high lactate  critically ill  appreciate heart failure recs  appreciate CICU care    [facility] DNR    PCP Dr. Steven Goldberg Dr. Khan will be covering starting tomorrow. [facility] with questions [phone].

## 2022-05-16 NOTE — PROGRESS NOTE ADULT - PROBLEM SELECTOR PLAN 4
- rising lactate despite CRRT and preserved cardiac output off dobutamine  - unclear source. consider abdominal CT  - pressor support to maintain MAP > 65
CRRT per nephrology

## 2022-05-16 NOTE — PROGRESS NOTE ADULT - SUBJECTIVE AND OBJECTIVE BOX
Patient is a 71y old  Male who presents with a chief complaint of Cardiogenic Shock (16 May 2022 14:26)      SUBJECTIVE / OVERNIGHT EVENTS:    Patient seen and examined in CICU. cannot provide ROS. on CVVH. vasopressin and levophed drips.    Vital Signs Last 24 Hrs  T(C): 37.8 (16 May 2022 15:00), Max: 37.8 (16 May 2022 15:00)  T(F): 100 (16 May 2022 15:00), Max: 100 (16 May 2022 15:00)  HR: 87 (16 May 2022 15:00) (77 - 97)  BP: --  BP(mean): --  RR: 37 (16 May 2022 15:00) (25 - 43)  SpO2: 100% (16 May 2022 15:00) (96% - 100%)  I&O's Summary    15 May 2022 07:01  -  16 May 2022 07:00  --------------------------------------------------------  IN: 2815.7 mL / OUT: 3072 mL / NET: -256.3 mL    16 May 2022 07:01  -  16 May 2022 15:32  --------------------------------------------------------  IN: 1078.4 mL / OUT: 967 mL / NET: 111.4 mL        PE:  HEAD:  Atraumatic, Normocephalic  CHEST/LUNG: coarse bs  HEART: Regular rate and rhythm; + murmur  ABDOMEN: Soft, Nontender, Nondistended; Bowel sounds present, rectal tube  gu medellin  EXTREMITIES:  2+ Peripheral Pulses, +2 le edema  NEURO: unresponsive    LABS:                        12.5   9.98  )-----------( 55       ( 16 May 2022 04:24 )             38.6     05-16    137  |  102  |  26<H>  ----------------------------<  170<H>  4.0   |  16<L>  |  2.03<H>    Ca    8.1<L>      16 May 2022 10:15  Phos  3.2     05-16  Mg     2.4     05-16    TPro  4.7<L>  /  Alb  3.0<L>  /  TBili  12.3<H>  /  DBili  x   /  AST  1087<H>  /  ALT  2672<H>  /  AlkPhos  210<H>  05-16    PT/INR - ( 16 May 2022 04:24 )   PT: 31.7 sec;   INR: 2.71 ratio         PTT - ( 16 May 2022 04:24 )  PTT:62.8 sec  CAPILLARY BLOOD GLUCOSE      POCT Blood Glucose.: 140 mg/dL (16 May 2022 14:10)  POCT Blood Glucose.: 125 mg/dL (16 May 2022 06:20)  POCT Blood Glucose.: 154 mg/dL (16 May 2022 02:29)  POCT Blood Glucose.: 142 mg/dL (15 May 2022 22:14)  POCT Blood Glucose.: 119 mg/dL (15 May 2022 17:30)    CARDIAC MARKERS ( 16 May 2022 04:24 )  x     / x     / 306 U/L / x     / 5.9 ng/mL          RADIOLOGY & ADDITIONAL TESTS:    Imaging Personally Reviewed:  [x] YES  [ ] NO    Consultant(s) Notes Reviewed:  [x] YES  [ ] NO    MEDICATIONS  (STANDING):  artificial  tears Solution 1 Drop(s) Both EYES three times a day  aspirin  chewable 81 milliGRAM(s) Enteral Tube daily  chlorhexidine 0.12% Liquid 15 milliLiter(s) Oral Mucosa every 12 hours  chlorhexidine 4% Liquid 1 Application(s) Topical <User Schedule>  CRRT Treatment    <Continuous>  dexMEDEtomidine Infusion 0.2 MICROgram(s)/kG/Hr (3.68 mL/Hr) IV Continuous <Continuous>  dextrose 50% Injectable 25 Gram(s) IV Push once  dextrose 50% Injectable 12.5 Gram(s) IV Push once  dextrose 50% Injectable 25 Gram(s) IV Push once  dextrose Oral Gel 15 Gram(s) Oral once  DOBUTamine Infusion 2 MICROgram(s)/kG/Min (4.41 mL/Hr) IV Continuous <Continuous>  glucagon  Injectable 1 milliGRAM(s) IntraMuscular once  heparin  Infusion Syringe 300 Unit(s)/Hr (0.6 mL/Hr) CRRT <Continuous>  insulin lispro (ADMELOG) corrective regimen sliding scale   SubCutaneous every 4 hours  metoclopramide 10 milliGRAM(s) Oral three times a day  multivitamin/minerals/iron Oral Solution (CENTRUM) 15 milliLiter(s) Oral daily  norepinephrine Infusion 0.5 MICROgram(s)/kG/Min (34.5 mL/Hr) IV Continuous <Continuous>  pantoprazole  Injectable 40 milliGRAM(s) IV Push two times a day  PrismaSATE Dialysate BGK 4 / 2.5 5000 milliLiter(s) (1500 mL/Hr) CRRT <Continuous>  PrismaSOL Filtration BGK 4 / 2.5 5000 milliLiter(s) (800 mL/Hr) CRRT <Continuous>  PrismaSOL Filtration BGK 4 / 2.5 5000 milliLiter(s) (200 mL/Hr) CRRT <Continuous>  vasopressin Infusion 0.1 Unit(s)/Min (6 mL/Hr) IV Continuous <Continuous>    MEDICATIONS  (PRN):      Care Discussed with Consultants/Other Providers [x] YES  [ ] NO    HEALTH ISSUES - PROBLEM Dx:  Functional quadriplegia    Acute on chronic systolic congestive heart failure    Cardiogenic shock    ACP (advance care planning)    Palliative care encounter    Acute renal failure    ALISSA (acute kidney injury)    Elevated LFTs    High serum lactate

## 2022-05-16 NOTE — CHART NOTE - NSCHARTNOTEFT_GEN_A_CORE
Afib RVR 150s on increased  5mcg, now febrile 100.9 Tmax off Abx.  Discussed case with Dr. Hilton/Ruth Ann - switch  to Milrinone 0.25, trend hemos/end organ markers.  Per Dr. Vallecillo will check BCx, start broad spectrum Abx, OK to give Tylenol dose.  Will reach out to daughter to give an update and further discuss GOC. Afib RVR 150s on increased  5mcg, now febrile 100.9 Tmax off Abx, with increased pressor support.  Discussed case with Dr. Hilton/Ruth Ann - switch  to Milrinone 0.25, trend hemos/end organ markers.  Per Dr. Vallecillo will check BCx, start broad spectrum Abx, OK to give Tylenol dose.  Will reach out to daughter to give an update and further discuss GOC.

## 2022-05-16 NOTE — PROGRESS NOTE ADULT - REASON FOR ADMISSION
Cardiogenic Shock

## 2022-05-16 NOTE — PROGRESS NOTE ADULT - SUBJECTIVE AND OBJECTIVE BOX
Subjective:  - remains intubated/sedated, on CRRT requiring vasopressor support  - dobutamine discontinued overnight with preserved output  - rising lactate this morning    Medications:  artificial  tears Solution 1 Drop(s) Both EYES three times a day  aspirin  chewable 81 milliGRAM(s) Enteral Tube daily  chlorhexidine 0.12% Liquid 15 milliLiter(s) Oral Mucosa every 12 hours  chlorhexidine 4% Liquid 1 Application(s) Topical <User Schedule>  CRRT Treatment    <Continuous>  dexMEDEtomidine Infusion 0.2 MICROgram(s)/kG/Hr IV Continuous <Continuous>  dextrose 50% Injectable 25 Gram(s) IV Push once  dextrose 50% Injectable 12.5 Gram(s) IV Push once  dextrose 50% Injectable 25 Gram(s) IV Push once  dextrose Oral Gel 15 Gram(s) Oral once  DOBUTamine Infusion 2 MICROgram(s)/kG/Min IV Continuous <Continuous>  glucagon  Injectable 1 milliGRAM(s) IntraMuscular once  heparin   Injectable 5000 Unit(s) SubCutaneous every 8 hours  heparin  Infusion Syringe 300 Unit(s)/Hr CRRT <Continuous>  insulin lispro (ADMELOG) corrective regimen sliding scale   SubCutaneous every 4 hours  metoclopramide 10 milliGRAM(s) Oral three times a day  multivitamin/minerals/iron Oral Solution (CENTRUM) 15 milliLiter(s) Oral daily  norepinephrine Infusion 0.05 MICROgram(s)/kG/Min IV Continuous <Continuous>  pantoprazole  Injectable 40 milliGRAM(s) IV Push two times a day  PrismaSATE Dialysate BGK 4 / 2.5 5000 milliLiter(s) CRRT <Continuous>  PrismaSOL Filtration BGK 4 / 2.5 5000 milliLiter(s) CRRT <Continuous>  PrismaSOL Filtration BGK 4 / 2.5 5000 milliLiter(s) CRRT <Continuous>  vasopressin Infusion 0.04 Unit(s)/Min IV Continuous <Continuous>      Physical Exam:    Vitals:  Vital Signs Last 24 Hours  T(C): 37.5 (05-16-22 @ 11:00), Max: 37.5 (05-16-22 @ 11:00)  HR: 87 (05-16-22 @ 14:00) (77 - 97)  BP: 105/-57  RR: 37 (05-16-22 @ 14:00) (25 - 43)  SpO2: 100% (05-16-22 @ 14:00) (96% - 100%)    I&O's Summary    15 May 2022 07:01  -  16 May 2022 07:00  --------------------------------------------------------  IN: 2815.7 mL / OUT: 3072 mL / NET: -256.3 mL    16 May 2022 07:01  -  16 May 2022 14:26  --------------------------------------------------------  IN: 890.3 mL / OUT: 848 mL / NET: 42.3 mL    Tele: aflutter    General: Intubated/sedated  HEENT: Deferred  Neck: Neck supple. JVP mildly elevated. No masses  Chest: Clear to auscultation bilaterally. Compliant with vent  CV: Normal S1 and S2. No murmurs, rub, or gallops. Radial pulses normal.  Abdomen: Soft, mildly distended, non-tender  : medellin in place with minimal concentrated urine  Skin: No rashes or skin breakdown. cold LE  Neurology: Intubated/sedated  Psych: JOHN    Labs:                        12.5   9.98  )-----------( 55       ( 16 May 2022 04:24 )             38.6     05-16    137  |  102  |  26<H>  ----------------------------<  170<H>  4.0   |  16<L>  |  2.03<H>    Ca    8.1<L>      16 May 2022 10:15  Phos  3.2     05-16  Mg     2.4     05-16    TPro  4.7<L>  /  Alb  3.0<L>  /  TBili  12.3<H>  /  DBili  x   /  AST  1087<H>  /  ALT  2672<H>  /  AlkPhos  210<H>  05-16    PT/INR - ( 16 May 2022 04:24 )   PT: 31.7 sec;   INR: 2.71 ratio         PTT - ( 16 May 2022 04:24 )  PTT:62.8 sec  CARDIAC MARKERS ( 16 May 2022 04:24 )  x     / x     / 306 U/L / x     / 5.9 ng/mL      Serum Pro-Brain Natriuretic Peptide: 99590 pg/mL (05-11 @ 22:39)  Serum Pro-Brain Natriuretic Peptide: 59007 pg/mL (05-11 @ 18:15)  Creatine Kinase, Serum: 306 U/L (05-16-22 @ 04:24)  Creatine Kinase, Serum: 306 U/L (05-16-22 @ 04:24)    Oxygen Saturation, Mixed: 68.3 (05-16 @ 04:15)  Oxygen Saturation, Mixed: 65.5 (05-15 @ 22:10)    Lactate, Blood: 7.7 mmol/L (05-16 @ 08:49)  Lactate, Blood: 6.7 mmol/L (05-15 @ 00:59)  Lactate, Blood: 7.0 mmol/L (05-14 @ 17:34)  Lactate, Blood: 7.1 mmol/L (05-14 @ 09:39)  Lactate, Blood: 6.2 mmol/L (05-13 @ 16:19)

## 2022-05-16 NOTE — PROGRESS NOTE ADULT - SUBJECTIVE AND OBJECTIVE BOX
SUBJECTIVE AND OBJECTIVE: Pt unable to provide history given sedation and poor mentation. Family present at bedside.   Overnight Events: No acute events reported.    DNR on chart:Yes  Yes      Allergies    No Known Allergies    Intolerances    MEDICATIONS  (STANDING):  artificial  tears Solution 1 Drop(s) Both EYES three times a day  aspirin  chewable 81 milliGRAM(s) Enteral Tube daily  chlorhexidine 0.12% Liquid 15 milliLiter(s) Oral Mucosa every 12 hours  chlorhexidine 4% Liquid 1 Application(s) Topical <User Schedule>  CRRT Treatment    <Continuous>  dexMEDEtomidine Infusion 0.2 MICROgram(s)/kG/Hr (3.68 mL/Hr) IV Continuous <Continuous>  dextrose 50% Injectable 12.5 Gram(s) IV Push once  dextrose 50% Injectable 25 Gram(s) IV Push once  dextrose 50% Injectable 25 Gram(s) IV Push once  dextrose Oral Gel 15 Gram(s) Oral once  DOBUTamine Infusion 2 MICROgram(s)/kG/Min (4.41 mL/Hr) IV Continuous <Continuous>  glucagon  Injectable 1 milliGRAM(s) IntraMuscular once  heparin  Infusion Syringe 300 Unit(s)/Hr (0.6 mL/Hr) CRRT <Continuous>  insulin lispro (ADMELOG) corrective regimen sliding scale   SubCutaneous every 4 hours  metoclopramide 10 milliGRAM(s) Oral three times a day  multivitamin/minerals/iron Oral Solution (CENTRUM) 15 milliLiter(s) Oral daily  norepinephrine Infusion 0.5 MICROgram(s)/kG/Min (34.5 mL/Hr) IV Continuous <Continuous>  pantoprazole  Injectable 40 milliGRAM(s) IV Push two times a day  PrismaSATE Dialysate BGK 4 / 2.5 5000 milliLiter(s) (1500 mL/Hr) CRRT <Continuous>  PrismaSOL Filtration BGK 4 / 2.5 5000 milliLiter(s) (800 mL/Hr) CRRT <Continuous>  PrismaSOL Filtration BGK 4 / 2.5 5000 milliLiter(s) (200 mL/Hr) CRRT <Continuous>  vasopressin Infusion 0.1 Unit(s)/Min (6 mL/Hr) IV Continuous <Continuous>    MEDICATIONS  (PRN):  HYDROmorphone  Injectable 0.5 milliGRAM(s) IV Push every 4 hours PRN tachypnea      ITEMS UNCHECKED ARE NOT PRESENT    PRESENT SYMPTOMS: [ ]Unable to self-report - see [x] CPOT [ ] PAINADS [x] RDOS  Source if other than patient:  [ ]Family   [x ]Team     Pain:  [ ]yes [x ]no  QOL impact -   Location -                    Aggravating factors -  Quality -  Radiation -  Timing-  Severity (0-10 scale):  Minimal acceptable level (0-10 scale):     CPOT:  0  https://www.[email or web address]/getattachment/[email or web address]/Critical-Care-Pain-Observation-Tool-(CPOT)    PAIN AD Score:	  http://geriatrictoolkit.[email or web address]/painad.pdf (Ctrl + left click to view)    Dyspnea:                           [ ]Mild [ ]Moderate [ ]Severe    RDOS:0  0 to 2  minimal or no respiratory distress   3  mild distress  4 to 6 moderate distress  >7 severe distress  https://homecareinformation.net/handouts/hen/Respiratory_Distress_Observation_Scale.pdf (Ctrl +  left click to view)     Anxiety:                             [ ]Mild [ ]Moderate [ ]Severe  Fatigue:                             [ ]Mild [ ]Moderate [ ]Severe  Nausea:                             [ ]Mild [ ]Moderate [ ]Severe  Loss of appetite:              [ ]Mild [ ]Moderate [ ]Severe  Constipation:                    [ ]Mild [ ]Moderate [ ]Severe    Other Symptoms:  [x]All other review of systems negative- unable to obtain due to poor mentation     Palliative Performance Status Version 2:       10  %      http://npcrc.org/files/news/palliative_performance_scale_ppsv2.pdf  PHYSICAL EXAM:  Vital Signs Last 24 Hrs  T(C): 37.8 (16 May 2022 15:00), Max: 37.8 (16 May 2022 15:00)  T(F): 100 (16 May 2022 15:00), Max: 100 (16 May 2022 15:00)  HR: 84 (16 May 2022 17:00) (77 - 97)  BP: --  BP(mean): --  RR: 38 (16 May 2022 17:00) (25 - 43)  SpO2: 100% (16 May 2022 17:00) (96% - 100%) I&O's Summary    15 May 2022 07:01  -  16 May 2022 07:00  --------------------------------------------------------  IN: 2815.7 mL / OUT: 3072 mL / NET: -256.3 mL    16 May 2022 07:01  -  16 May 2022 17:24  --------------------------------------------------------  IN: 1206.5 mL / OUT: 1292 mL / NET: -85.5 mL    GENERAL:  Intubated and sedated  [ ]Alert  [ ]Oriented x 3  [ ]Lethargic  [ ]Cachexia  [ ]Unarousable  [ ]Verbal  []Non-Verbal  Behavioral:   [ ]Anxiety  [ ]Delirium [ ]Agitation [ ]Other  HEENT:  [ ]Normal   [ ]Dry mouth   [x]ET Tube/Trach  [ ]Oral lesions  PULMONARY:   [x]Clear [ ]Tachypnea  [ ]Audible excessive secretions   [ ]Rhonchi        [ ]Right [ ]Left [ ]Bilateral  [ ]Crackles        [ ]Right [ ]Left [ ]Bilateral  [ ]Wheezing     [ ]Right [ ]Left [ ]Bilateral  [ ]Diminished BS [ ] Right [ ]Left [ ]Bilateral  CARDIOVASCULAR:    [x]Regular [ ]Irregular [ ]Tachy  [ ]Gavin [ ]Murmur [ ]Other  GASTROINTESTINAL:  [x]Soft  [ ]Distended   [x]+BS  [x]Non tender [ ]Tender  [ ]PEG [ ]OGT/ NGT   Last BM:    GENITOURINARY:  [x]Normal [ ]Incontinent   [ ]Oliguria/Anuria   [x][place]  MUSCULOSKELETAL:   [ ]Normal   [ ]Weakness  [x]Bed/Wheelchair bound [x ]Edema  NEUROLOGIC:   [ ]No focal deficits  [x] Cognitive impairment  [ ] Dysphagia [ ]Dysarthria [ ] Paresis [ ]Other   SKIN:   [x]Normal  [ ]Rash   [ ]Pressure ulcer(s) [ ]y [ ]n present on admission  CRITICAL CARE:  [ ]Shock Present  [ ]Septic [ ]Cardiogenic [ ]Neurologic [ ]Hypovolemic  [ ]Vasopressors [ ]Inotropes  [x ]Respiratory failure present [x ]Mechanical Ventilation [ ]Non-invasive ventilatory support [ ]High-Flow Mode: AC/ CMV (Assist Control/ Continuous Mandatory Ventilation), RR (machine): 16, TV (machine): 400, FiO2: 30, PEEP: 5, ITime: 0.72, MAP: 11, PIP: 28  [ ]Acute  [ ]Chronic [ ]Hypoxic  [ ]Hypercarbic [ ]Other  [x]Other organ failure -renal    LABS:                        12.8   10.81 )-----------( 60       ( 16 May 2022 15:55 )             40.7   05-16    136  |  103  |  27<H>  ----------------------------<  177<H>  4.2   |  14<L>  |  1.88<H>    Ca    8.4      16 May 2022 15:55  Phos  3.2     05-16  Mg     2.4     05-16    TPro  4.9<L>  /  Alb  3.0<L>  /  TBili  13.0<H>  /  DBili  x   /  AST  940<H>  /  ALT  2480<H>  /  AlkPhos  223<H>  05-16  PT/INR - ( 16 May 2022 04:24 )   PT: 31.7 sec;   INR: 2.71 ratio         PTT - ( 16 May 2022 04:24 )  PTT:62.8 sec      RADIOLOGY & ADDITIONAL STUDIES:    < from: [facility] Duplex Lower Ext Vein Scan, Bilat (05.16.22 @ 14:11) >  ACC: [id number] EXAM:  DUPLEX SCAN EXT VEINS LOWER BI                          PROCEDURE DATE:  05/16/2022          INTERPRETATION:  CLINICAL INFORMATION: Shortness of breath. Bilateral leg   swelling.    COMPARISON: None available.    TECHNIQUE: Duplex sonography of the BILATERAL LOWER extremity veins with   color and spectral Doppler, with and without compression. Left common   femoral vein is suboptimally visualized due to overlying dressings.    FINDINGS:    RIGHT:  Normal compressibility of the RIGHT common femoral, femoral and popliteal   veins. There is mural thickening in the right common femoral vein.  Doppler examination shows normal spontaneous and phasic flow.  No RIGHT calf vein thrombosis is detected.    LEFT:  Normal compressibility of the LEFT femoral and popliteal veins.  Doppler examination shows normal spontaneous and phasic flow.  No LEFT calf vein thrombosis is detected.    IMPRESSION:  No evidence of deep venous thrombosis in either lower extremity.          --- End of Report ---            NARINDER DONNELLY MD; Attending Radiologist  This document has been electronically signed. May 16 2022  3:09PM    < end of copied text >      Protein Calorie Malnutrition Present: [ ]mild [ ]moderate [ ]severe [ ]underweight [ ]morbid obesity  https://www.andeal.org/vault/2440/web/files/ONC/Table_Clinical%20Characteristics%20to%20Document%20Malnutrition-White%20JV%20et%20al%202012.pdf    Height (cm): 167.6 (05-11-22 @ 17:11)  Weight (kg): 73.5 (05-11-22 @ 17:11)  BMI (kg/m2): 26.2 (05-11-22 @ 17:11)    [ ]PPSV2 < or = 30%  [ ]significant weight loss [ ]poor nutritional intake [ ]anasarca[ ]Artificial Nutrition    REFERRALS:   [ ]Chaplaincy  [ ]Hospice  [ ]Child Life  [ ]Social Work  [ ]Case management [ ]Holistic Therapy     Goals of Care Document:

## 2022-05-16 NOTE — PROGRESS NOTE ADULT - PROBLEM SELECTOR PLAN 1
Patient found to be metapneumovirus positive, shock likely mixed with both sepsis and cardiogenic. Now w/ impella placement. Currently on levo and vaso. Milrinone was able to weaned off overnight. Currently weaning down dobutamine. [place] in place. Echo showed biventricular failure. RHC showed elevated filling pressures   -Previously on bumex gtt, now off diuretics. Diuretic challenge with lasix 80mg IV, may need escalation to gtt if not responding  -Strict I/O   -Continue dobutamine at 3 mcg/kg/min  - wean pressors as above  -F/u cultures   -Agree with empiric abx
Pt. with ALISSA in the setting of cardiogenic shock. On review of [facility], it was noted that Scr was 1.80 on 5/11 on admission. Scr increased to 4.13 on 5/13. No prior labs available. Pt.s daughter denied Hx of kidney disease in the past. UA showed trace proteins and significant glucosuria. Initiated on CRRT in view of acidosis. Pt. likely has ALISSA/ATN in the setting of cardiogenic shock. Currently on IV vasopressor support. Continue with CRRT with net neutral balance for now. Monitor UOP, If it remains low, would give diuretics. Monitor labs and urine output. Avoid any potential nephrotoxins. Dose medications as per eGFR.     If you have any questions, please feel free to contact me  Roque Duong  Nephrology Fellow  [phone]/ [employer] Teams(Preferred)  (After 5pm or on weekends please page the on-call fellow).
PPSV 10%  Pt requiring assistance with all ADLs.
Patient found to be metapneumovirus positive, shock likely mixed with both sepsis and cardiogenic. Now w/ impella placement. Currently on levo and vaso. Now off inotropes. [place] in place. Echo showed biventricular failure. RHC showed elevated filling pressures   -minimal urine output. now on CRRT, run net even. Goal CVP < 10  - consider removing medellin given scant urine output  - trend hemodynamics and perfusion labs  - wean pressors as able to maintain MAP > 65  - recommend palliative care follow up

## 2022-05-16 NOTE — PROGRESS NOTE ADULT - NS MD NEURO CONDITIONS_RENAL
ATN
Alert-The patient is alert, awake and responds to voice. The patient is oriented to time, place, and person. The triage nurse is able to obtain subjective information.
ATN

## 2022-05-16 NOTE — PROGRESS NOTE ADULT - TIME BILLING
- Generation of cardiovascular treatment plan.  - Coordination of care with primary team.
- Generation of cardiovascular treatment plan.  - Coordination of care with primary team.

## 2022-05-16 NOTE — PROGRESS NOTE ADULT - ATTENDING COMMENTS
Mitesh Rodriguez MD  O: [phone]  Contact me on teams
Mitesh Rodriguez MD  O: [phone]  Contact me on teams
Patient seen and examined on CRRT.    # ALISSA on CKD. Patietn developed ATN due to hypotension. CRRT started due to metabolic derangement. Continue CRRT.     # Cardiogenic shock. Continue IV vasopressors.     The rest of the recommendations as per fellow's note.    Ronda Garcia MD  Attending Nephrologist  [phone]
Patient with known history of severe nonischemic cardiomyopathy status post primary prevention ICD (Biotronik), previously well compensated by outpatient cardiology (Steve Goldberg, MD at [facility]), now with acute decompensated systolic heart failure in the setting of one week of viral syndrome.  Seen to have cardiogenic shock requiring intubated and mechanical circulatory support with Impella placed on 5/11/2022.  Wean percutaneous LVAD support as tolerated. Hemolysis and thrombocytopenia noted.  Wean inotrope support as tolerated. Hemodynamic monitoring via [place].  Wean pressor support as tolerated. Target MAP ~65 mmHg.  Wean ventilatory support as tolerated.    Patient required frequent reassessments and medication adjustments throughout the day.  The Impella was ultimately explanted.
Patient with known history of severe nonischemic cardiomyopathy status post primary prevention ICD (Biotronik), previously well compensated by outpatient cardiology (Steve Goldberg, MD at [facility]), presented with acute decompensated systolic heart failure in the setting of one week of viral syndrome.    In cardiogenic shock required intubation and mechanical circulatory support with Impella placed on 2022. Impella has been explanted.  Now in mixed shock likely cardiogenic to small extend and vasoplegia requiring significant pressor support,  has been successfully weaned off with mixed sats 60s  In multi organ failure with minimal improvement on CVVHD  no fevers, will stop abx given neg cx  H/H stable, elevated INR in setting of shock liver   discussed with daughter today current condition, pt was made DNR previously.  She is having a hard time making a final decision but wishes for her family to participate as well.
Patient with known history of severe nonischemic cardiomyopathy status post primary prevention ICD (Biotronik), previously well compensated by outpatient cardiology (Steve Goldberg, MD at [facility]), now with acute decompensated systolic heart failure in the setting of one week of viral syndrome.  Seen to have cardiogenic shock requiring intubated and mechanical circulatory support with Impella placed on 5/11/2022. Impella has been explanted.    Wean inotrope support as tolerated. Hemodynamic monitoring via [place].  Wean pressor support as tolerated. Target MAP ~65 mmHg.  Wean ventilatory support as tolerated.
Patient with known history of severe nonischemic cardiomyopathy status post primary prevention ICD (Biotronik), previously well compensated by outpatient cardiology (Steve Goldberg, MD at [facility]), now with acute decompensated systolic heart failure in the setting of one week of viral syndrome.  Seen to have cardiogenic shock requiring intubated and mechanical circulatory support with Impella placed on 5/11/2022. Impella has been explanted.    ALISSA with interval improvement in serum creatinine.  Shock liver, now with downtrending LFTs - continue to trend.    Wean inotrope support as tolerated. Hemodynamic monitoring via [place].  Wean pressor support as tolerated. Target MAP ~65 mmHg.  Wean ventilatory support as tolerated.
Patient is a 71 year old male with past medical history of HFrEF s/p ICD, CAD, T2DM who presents with SOB for several days and orthopnea.  His symptoms worsened and came to the emergency room. He was noted to be tachypneic and hypotensive. On labwork he was noted to have an ALISSA, elevated LFTs and lactic acid. He also was noted to be positive for metapneumovirus. He was intubated and started on dobutamine and milrinione. The shock team was activated. His presentation appeared to be a mixed cardiogenic and distributive picture. The decision was made to place an impella which has now been removed yesterday  Echo showed an LVEF of <20%, LVEDD 5.6cm, mild/mod MR, reduced RV function  Recs:  Patient appears to be in a mixed cardiogenic/distributive shock. Current cardiac index is 2.8, off impella support on dobutamine 3. Will wean dobutamine slowly over the weekend based on hemodynamics  Noted to have reducing urine output, possible ATN in the setting of initial shock. Would recommend consulting renal for initiating of CVVHD  Switch swan to neck  Continue to monitor daily perfusion markers  Agree with broad spectrum antibiotics in the setting of positive RVP and fevers (last fever was yesterday). Noted to have a jump in band percentage today  Per family discussion, he is still DNR but are ok with escalating support measures such as dialysis if needed
Patient is a 71 year old male with past medical history of HFrEF s/p ICD, CAD, T2DM who presents with SOB for several days and orthopnea.  His symptoms worsened and came to the emergency room. He was noted to be tachypneic and hypotensive. On labwork he was noted to have an ALISSA, elevated LFTs and lactic acid. He also was noted to be positive for metapneumovirus. He was intubated and started on dobutamine and milrinione. The shock team was activated. His presentation appeared to be a mixed cardiogenic and distributive picture. The decision was made to place an impella which has now been removed yesterday  Echo showed an LVEF of <20%, LVEDD 5.6cm, mild/mod MR, reduced RV function  Recs:  Dobtumaine weaned off over the weekend with low normal CI  Noted to have rising LA despite being on CVVHD and clearing liver function  Continue to monitor daily perfusion markers  C/w broad spectrum antibiotics  Per family discussion, he is still DNR but are ok with escalating support measures such as dialysis if needed  Poor prognosis overall  Not a candidate for escalation of care to [facility]

## 2022-05-17 NOTE — PROCEDURE NOTE - NSINFORMCONSENT_GEN_A_CORE
consent from HCP (wife)/Benefits, risks, and possible complications of procedure explained to patient/caregiver who verbalized understanding and gave verbal consent.

## 2022-05-17 NOTE — CHART NOTE - NSCHARTNOTEFT_GEN_A_CORE
Family updated, patient on max pressor support, worsening multiorgan failure.  Per daughter Josefina, family declining to come in again, continue current medical management, requesting to shut off AICD.  Family refusing to withdraw any care, wants patient to pass on own. Family updated, patient on max pressor support, worsening multiorgan failure.  Per daughter Josefina and wife, family declining to come in again, continue current medical management, requesting to shut off AICD.  Family refusing to withdraw any care, wants patient to pass on own.

## 2022-05-17 NOTE — PROCEDURE NOTE - ADDITIONAL PROCEDURE DETAILS
1. Asked by family to turn off tachyarrhythmia detection and therapies in the setting of expected death.  2. All tachyarrhythmia detection and therapies turned off.  3. Interrogation print out placed into pt's paper chart for review.
Indication for interrogation: AT  Presenting rhythm: AT with 4:1 conduction 70s  Measured data WNL, normal ICD function, Pt is NOT pacemaker dependent  Stored data revealed AT started yesterday (5/11/22)

## 2022-05-17 NOTE — DISCHARGE NOTE FOR THE EXPIRED PATIENT - HOSPITAL COURSE
HPI: 71M Hx DMII, CHF (30%), AICD 1yr ago, HLD. He is a former smoker (quit approx 30 years ago). Pt received his second COVID 19 booster shot this week.  Per daughter, pt began having some new lower extremity edema last week and later developed a night time cough. His symptoms improved after a few days until today when his cough worsened and he became extremely tachypneic and short of breath. Daughter became very concerned and brought him to ED.  In the ED, pt was tachypneic, with systolic BP in the 70s maxed on levo. He was also on Bipap with RR 40s, TV 200s and not mentating well. Lactate was reportedly 11 and pH was 7. In the ED, he was started on milrinone and dobutamine and then shock team was activated. The patient was taken to cath lab and an Impella was placed.  Upon arrival to CICU, pt was on Dobutamine @10, Milrinone @.125, Vaso 0.08, Levo @ 1.0, intubated on PEEP 8 and fio2 100% (11 May 2022 22:58)    Worsening multiorgan failure, not a candidate for tMCS escalation, poor prognosis with on-going GOC.  Discussed case with HF team and CICU attending.  Patient max on pressor support.  Family refused to withdraw care however wanted AICD turned off.   06:37

## 2022-05-22 LAB
CULTURE RESULTS: SIGNIFICANT CHANGE UP
CULTURE RESULTS: SIGNIFICANT CHANGE UP
SPECIMEN SOURCE: SIGNIFICANT CHANGE UP
SPECIMEN SOURCE: SIGNIFICANT CHANGE UP

## 2022-09-20 NOTE — PROGRESS NOTE ADULT - PROVIDER SPECIALTY LIST ADULT
INNA Muscle Hinge Flap Text: The defect edges were debeveled with a #15 scalpel blade.  Given the size, depth and location of the defect and the proximity to free margins a muscle hinge flap was deemed most appropriate.  Using a sterile surgical marker, an appropriate hinge flap was drawn incorporating the defect. The area thus outlined was incised with a #15 scalpel blade.  The skin margins were undermined to an appropriate distance in all directions utilizing iris scissors.

## 2025-03-29 NOTE — PROGRESS NOTE ADULT - ASSESSMENT
-hold home levothyroxine 75 mcg daily for now  -may need to convert to IV alternative if unable to tolerate off LIWS     72 y/o man with PMHx DMII, CHF (30%), Biotronik ICD (4/22/20), HLD. He is a former smoker (quit approx 30 years ago). Pt received his second COVID 19 booster shot this week. He is now intubated/sedated s/p Impella and is on Dobutamine and Vaso. EP consulted for management of AT.    1. AT/AFL  2. Cardiogenic shock  3. HFrEF  4. Thrombocytopenia  5. Metapneumovirus     - Rates controlled in AT/AFL 70-80s  - Would start low dose beta blocker when off pressor support  - Anticoagulation when able  - Continue close telemetry monitoring  - Keep K>4 and Mg> 2 72 y/o man with PMHx DMII, CHF (30%), Biotronik ICD (4/22/20), HLD. He is a former smoker (quit approx 30 years ago). Pt received his second COVID 19 booster shot this week. He is now intubated/sedated s/p Impella and is on Dobutamine and Vaso. EP consulted for management of AT.    1. AT/AFL  2. Cardiogenic shock  3. HFrEF  4. Thrombocytopenia  5. Metapneumovirus     - Rates controlled in AT/AFL 70-80s  - Would start low dose beta blocker when off pressor support  - Anticoagulation when able  - Continue close telemetry monitoring  - Keep K>4 and Mg> 2  - EP to sign off, reconsult as needed
